# Patient Record
Sex: FEMALE | Race: WHITE | NOT HISPANIC OR LATINO | Employment: OTHER | URBAN - METROPOLITAN AREA
[De-identification: names, ages, dates, MRNs, and addresses within clinical notes are randomized per-mention and may not be internally consistent; named-entity substitution may affect disease eponyms.]

---

## 2018-02-15 ENCOUNTER — OFFICE VISIT CONVERTED (OUTPATIENT)
Dept: ORTHOPEDIC SURGERY | Facility: CLINIC | Age: 68
End: 2018-02-15
Attending: PHYSICIAN ASSISTANT

## 2018-04-05 ENCOUNTER — OFFICE VISIT CONVERTED (OUTPATIENT)
Dept: ORTHOPEDIC SURGERY | Facility: CLINIC | Age: 68
End: 2018-04-05
Attending: PHYSICIAN ASSISTANT

## 2018-05-01 ENCOUNTER — OFFICE VISIT CONVERTED (OUTPATIENT)
Dept: ORTHOPEDIC SURGERY | Facility: CLINIC | Age: 68
End: 2018-05-01
Attending: PHYSICIAN ASSISTANT

## 2018-06-15 ENCOUNTER — OFFICE VISIT CONVERTED (OUTPATIENT)
Dept: ORTHOPEDIC SURGERY | Facility: CLINIC | Age: 68
End: 2018-06-15
Attending: PHYSICIAN ASSISTANT

## 2018-06-25 ENCOUNTER — OFFICE VISIT CONVERTED (OUTPATIENT)
Dept: GASTROENTEROLOGY | Facility: CLINIC | Age: 68
End: 2018-06-25
Attending: INTERNAL MEDICINE

## 2018-08-07 ENCOUNTER — OFFICE VISIT CONVERTED (OUTPATIENT)
Dept: ORTHOPEDIC SURGERY | Facility: CLINIC | Age: 68
End: 2018-08-07
Attending: PHYSICIAN ASSISTANT

## 2018-09-04 ENCOUNTER — OFFICE VISIT CONVERTED (OUTPATIENT)
Dept: ORTHOPEDIC SURGERY | Facility: CLINIC | Age: 68
End: 2018-09-04
Attending: PHYSICIAN ASSISTANT

## 2018-10-04 ENCOUNTER — OFFICE VISIT CONVERTED (OUTPATIENT)
Dept: ORTHOPEDIC SURGERY | Facility: CLINIC | Age: 68
End: 2018-10-04
Attending: PHYSICIAN ASSISTANT

## 2019-04-25 ENCOUNTER — HOSPITAL ENCOUNTER (OUTPATIENT)
Dept: GENERAL RADIOLOGY | Facility: HOSPITAL | Age: 69
Discharge: HOME | End: 2019-04-25
Attending: FAMILY MEDICINE

## 2019-06-28 ENCOUNTER — HOSPITAL ENCOUNTER (OUTPATIENT)
Dept: LAB | Facility: HOSPITAL | Age: 69
Discharge: HOME | End: 2019-06-28
Attending: INTERNAL MEDICINE

## 2019-06-28 LAB
ALBUMIN SERPL-MCNC: 3.7 G/DL (ref 3.5–5)
ALBUMIN/GLOB SERPL: 1.3 {RATIO} (ref 1.4–2.6)
ALP SERPL-CCNC: 84 U/L (ref 43–160)
ALT SERPL-CCNC: 17 U/L (ref 10–40)
ANION GAP SERPL CALC-SCNC: 17 MMOL/L (ref 8–19)
AST SERPL-CCNC: 15 U/L (ref 15–50)
BILIRUB SERPL-MCNC: 0.26 MG/DL (ref 0.2–1.3)
BNP SERPL-MCNC: 480 PG/ML (ref 0–900)
BUN SERPL-MCNC: 37 MG/DL (ref 5–25)
BUN/CREAT SERPL: 28 {RATIO} (ref 6–20)
CALCIUM SERPL-MCNC: 9.4 MG/DL (ref 8.7–10.4)
CHLORIDE SERPL-SCNC: 106 MMOL/L (ref 99–111)
CHOLEST SERPL-MCNC: 163 MG/DL (ref 107–200)
CHOLEST/HDLC SERPL: 4.9 {RATIO} (ref 3–6)
CONV CO2: 25 MMOL/L (ref 22–32)
CONV TOTAL PROTEIN: 6.5 G/DL (ref 6.3–8.2)
CREAT UR-MCNC: 1.3 MG/DL (ref 0.5–0.9)
GFR SERPLBLD BASED ON 1.73 SQ M-ARVRAT: 41 ML/MIN/{1.73_M2}
GLOBULIN UR ELPH-MCNC: 2.8 G/DL (ref 2–3.5)
GLUCOSE SERPL-MCNC: 112 MG/DL (ref 65–99)
HDLC SERPL-MCNC: 33 MG/DL (ref 40–60)
LDLC SERPL CALC-MCNC: 102 MG/DL (ref 70–100)
OSMOLALITY SERPL CALC.SUM OF ELEC: 307 MOSM/KG (ref 273–304)
POTASSIUM SERPL-SCNC: 4.3 MMOL/L (ref 3.5–5.3)
SODIUM SERPL-SCNC: 144 MMOL/L (ref 135–147)
T4 FREE SERPL-MCNC: 1.4 NG/DL (ref 0.9–1.8)
TRIGL SERPL-MCNC: 138 MG/DL (ref 40–150)
TSH SERPL-ACNC: 1.9 M[IU]/L (ref 0.27–4.2)
VLDLC SERPL-MCNC: 28 MG/DL (ref 5–37)

## 2019-08-13 ENCOUNTER — HOSPITAL ENCOUNTER (OUTPATIENT)
Dept: CARDIOLOGY | Facility: HOSPITAL | Age: 69
Discharge: HOME | End: 2019-08-13
Attending: INTERNAL MEDICINE

## 2019-08-13 LAB
ANION GAP SERPL CALC-SCNC: 19 MMOL/L (ref 8–19)
BASOPHILS # BLD AUTO: 0.13 10*3/UL (ref 0–0.2)
BASOPHILS NFR BLD AUTO: 1 % (ref 0–3)
BNP SERPL-MCNC: 454 PG/ML (ref 0–900)
BUN SERPL-MCNC: 51 MG/DL (ref 5–25)
BUN/CREAT SERPL: 28 {RATIO} (ref 6–20)
CALCIUM SERPL-MCNC: 9.2 MG/DL (ref 8.7–10.4)
CHLORIDE SERPL-SCNC: 104 MMOL/L (ref 99–111)
CONV ABS IMM GRAN: 0.04 10*3/UL (ref 0–0.2)
CONV CO2: 27 MMOL/L (ref 22–32)
CONV IMMATURE GRAN: 0.3 % (ref 0–1.8)
CREAT UR-MCNC: 1.79 MG/DL (ref 0.5–0.9)
DEPRECATED RDW RBC AUTO: 47.4 FL (ref 36.4–46.3)
EOSINOPHIL # BLD AUTO: 0.82 10*3/UL (ref 0–0.7)
EOSINOPHIL # BLD AUTO: 6.6 % (ref 0–7)
ERYTHROCYTE [DISTWIDTH] IN BLOOD BY AUTOMATED COUNT: 13.9 % (ref 11.7–14.4)
GFR SERPLBLD BASED ON 1.73 SQ M-ARVRAT: 28 ML/MIN/{1.73_M2}
GLUCOSE SERPL-MCNC: 118 MG/DL (ref 65–99)
HCT VFR BLD AUTO: 40.5 % (ref 37–47)
HGB BLD-MCNC: 12.7 G/DL (ref 12–16)
LYMPHOCYTES # BLD AUTO: 3.7 10*3/UL (ref 1–5)
LYMPHOCYTES NFR BLD AUTO: 29.9 % (ref 20–45)
MCH RBC QN AUTO: 29.1 PG (ref 27–31)
MCHC RBC AUTO-ENTMCNC: 31.4 G/DL (ref 33–37)
MCV RBC AUTO: 92.7 FL (ref 81–99)
MONOCYTES # BLD AUTO: 1.26 10*3/UL (ref 0.2–1.2)
MONOCYTES NFR BLD AUTO: 10.2 % (ref 3–10)
NEUTROPHILS # BLD AUTO: 6.44 10*3/UL (ref 2–8)
NEUTROPHILS NFR BLD AUTO: 52 % (ref 30–85)
NRBC CBCN: 0 % (ref 0–0.7)
OSMOLALITY SERPL CALC.SUM OF ELEC: 315 MOSM/KG (ref 273–304)
PLATELET # BLD AUTO: 540 10*3/UL (ref 130–400)
PMV BLD AUTO: 9.5 FL (ref 9.4–12.3)
POTASSIUM SERPL-SCNC: 4.7 MMOL/L (ref 3.5–5.3)
RBC # BLD AUTO: 4.37 10*6/UL (ref 4.2–5.4)
SODIUM SERPL-SCNC: 145 MMOL/L (ref 135–147)
WBC # BLD AUTO: 12.39 10*3/UL (ref 4.8–10.8)

## 2019-08-29 ENCOUNTER — HOSPITAL ENCOUNTER (OUTPATIENT)
Dept: GENERAL RADIOLOGY | Facility: HOSPITAL | Age: 69
Discharge: HOME | End: 2019-08-29
Attending: NURSE PRACTITIONER

## 2019-08-29 LAB — TROPONIN T SERPL-MCNC: 0.02 NG/ML (ref 0–0.1)

## 2020-02-26 ENCOUNTER — OFFICE VISIT CONVERTED (OUTPATIENT)
Dept: CARDIOLOGY | Facility: CLINIC | Age: 70
End: 2020-02-26
Attending: INTERNAL MEDICINE

## 2020-06-08 ENCOUNTER — HOSPITAL ENCOUNTER (OUTPATIENT)
Dept: GENERAL RADIOLOGY | Facility: HOSPITAL | Age: 70
Discharge: HOME | End: 2020-06-08
Attending: INTERNAL MEDICINE

## 2020-12-09 ENCOUNTER — TELEMEDICINE CONVERTED (OUTPATIENT)
Dept: CARDIOLOGY | Facility: CLINIC | Age: 70
End: 2020-12-09
Attending: INTERNAL MEDICINE

## 2020-12-31 ENCOUNTER — TELEMEDICINE CONVERTED (OUTPATIENT)
Dept: NEUROSURGERY | Facility: CLINIC | Age: 70
End: 2020-12-31
Attending: PHYSICIAN ASSISTANT

## 2021-01-06 ENCOUNTER — HOSPITAL ENCOUNTER (OUTPATIENT)
Dept: CT IMAGING | Facility: HOSPITAL | Age: 71
Discharge: HOME | End: 2021-01-06
Attending: PHYSICIAN ASSISTANT

## 2021-02-17 ENCOUNTER — TELEMEDICINE CONVERTED (OUTPATIENT)
Dept: NEUROSURGERY | Facility: CLINIC | Age: 71
End: 2021-02-17
Attending: PHYSICIAN ASSISTANT

## 2021-03-30 ENCOUNTER — HOSPITAL ENCOUNTER (OUTPATIENT)
Dept: CARDIOLOGY | Facility: HOSPITAL | Age: 71
Discharge: HOME | End: 2021-03-30
Attending: SURGERY

## 2021-04-13 ENCOUNTER — OFFICE VISIT CONVERTED (OUTPATIENT)
Dept: SURGERY | Facility: CLINIC | Age: 71
End: 2021-04-13
Attending: SURGERY

## 2021-04-15 ENCOUNTER — HOSPITAL ENCOUNTER (OUTPATIENT)
Dept: GENERAL RADIOLOGY | Facility: HOSPITAL | Age: 71
Discharge: HOME | End: 2021-04-15
Attending: NURSE PRACTITIONER

## 2021-04-15 LAB
CREAT BLD-MCNC: 1.1 MG/DL (ref 0.6–1.4)
GFR SERPLBLD BASED ON 1.73 SQ M-ARVRAT: 50 ML/MIN/{1.73_M2}

## 2021-05-10 NOTE — H&P
History and Physical      Patient Name: Deisy Lama   Patient ID: [id number]   Sex: Female   YOB: 1950    Primary Care Provider: Michael Pappas MD   Referring Provider: MELIA BOYD    Visit Date: December 31, 2020    Provider: Summer Valdes PA-C   Location: [facility]   Location Address: [address] [place] [address]  [id number]   Location Phone: [phone]          Chief Complaint  · Back pain     New patient presenting with low back pain, no imaging.       History Of Present Illness  The patient is a 70 year old /White female, who presents on referral from MELIA BOYD, for a neurosurgical evaluation of low back pain and right leg pain.   The right leg pain involves the right lower leg in a nonspecific distribution. The pain developed gradually several years ago. It is 10/10 in severity has an aching and a sharp quality and radiates into the right lower leg in a nonspecific distribution. The pain has been constant. The pain tends to be maximal at no specific time, but waxes and wanes in severity throughout the day. The patient states the pain is aggravated by prolonged sitting, prolonged standing, walking long distances, and staying in one position for extended periods. Improves slightly with lying down.   She denies bowel or bladder dysfunction. The patient has no prior history of neck or back surgery.   RECENT INTERVENTIONS:  She has been previously treated with epidural steroids, pain medication, and gabapentin. The epidural steroids were ineffective.   INFORMATION REVIEWED:  The following information was reviewed: radiology reports and images. Lumbar radiographs and from June 2020 at [facility] revealed scoliosis with severe degenerative changes.   Video Conferencing Visit  Deisy Lama is a 70 year old /White female who is presenting for evaluation via video conferencing via zoom. Verbal consent obtained before  beginning visit.   The following staff were present during this visit: Roslyn Swift MA & Summer Valdes PA-C      She has pacemaker with defibrillator and cannot have a MRI.       Past Medical History  Aftercare following right knee joint replacement surgery; Arthritis; Bladder Disorder; Blood Diseases; Congestive heart failure; Cystourethrocele; Heart Disease; Hyperlipemia; Hypertension; Kidney stone; Limb Swelling; Primary osteoarthritis of left knee; Primary osteoarthritis of right knee; Reflux; Shortness of Breath; Stomach Disorder; Thyroid disorder; Ulcer         Past Surgical History  Appendectomy; Bladder Surg.; Cardiac defibrillator; Cataract exctraction with lens implant; Cholecystectomy; Colonoscopy; Eye Implant; Gallbladder; heart surgery; Hernia; Hysterectomy; Kidney Stone Surgery, Unspecified; Pacemaker; Pacemaker.; Spleen Removal; Tonsillectomy         Medication List  atorvastatin 10 mg oral tablet; diclofenac sodium 75 mg oral tablet,delayed release (DR/EC); furosemide 40 mg/4 mL oral solution; hydrochlorothiazide 25 mg oral tablet; levothyroxine 112 mcg oral tablet; losartan 100 mg oral tablet; metoprolol tartrate 50 mg oral tablet; omeprazole 40 mg oral capsule,delayed release(DR/EC); pravastatin 20 mg oral tablet; ropinirole 2 mg oral tablet; Zoloft 100 mg oral tablet         Allergy List  aspirin; Dilaudid; lisinopril; morphine       Allergies Reconciled  Family Medical History  Stroke; Heart Disease; Cancer, Unspecified; Diabetes, unspecified type; Prostate cancer; Family history of certain chronic disabling diseases; arthritis; Osteoporosis         Social History  Alcohol (Never); Alcohol Use (Never); Claustophobic (Unknown); Disabled; ; lives with spouse; .; Recreational Drug Use (Never); Retired.; Tobacco (Never)         Review of Systems  · Musculoskeletal  o Admits  o : low back pain  · All Others Negative      Physical Examination  · Constitutional  o Appearance  o :  "well-nourished, well developed, alert, in no acute distress  · Respiratory  o Respiratory Effort  o : breathing unlabored  · Neurologic  o Mental Status Examination  o :   § Orientation  § : alert and oriented to time, person, place and events  · Psychiatric  o Mood and Affect  o : mood normal, affect appropriate          Assessment  · Degeneration of lumbar intervertebral disc     722.52/M51.36  · Facet arthropathy     721.90/M46.90  · Radiculopathy, lumbosacral     724.4/M54.16  · Scoliosis     737.30/M41.9      Plan  · Orders  o CT Lumbar Spine without IV Contrast [facility] (06833) - 722.52/M51.36, 721.90/M46.90, 724.4/M54.16, 737.30/M41.9 - 12/31/2020   [facility] in the afternoon  · Medications  o Medications have been Reconciled  o Transition of Care or Provider Policy  · Instructions  o Encouraged to follow-up with Primary Care Provider for preventative care.  o The ROS and the PFSH were reviewed at today's visit.  o Call or return to office if symptoms worsen or persist.  o She has a pacemaker/defibrillator so will order CT lumbar spine and f/u to discuss results.   · Associate Tasks  o Task ID [id number] \"''Provider to Nurse: needs CT lumbar spine and f/u to discuss results via telehealth or in person            Electronically Signed by: NATALY GodinezC -Author on December 31, 2020 10:43:54 AM  "

## 2021-05-11 NOTE — H&P
History and Physical      Patient Name: Deisy Lama   Patient ID: [id number]   Sex: Female   YOB: 1950    Primary Care Provider: Michael Pappas MD   Referring Provider: Aayush Priest MD    Visit Date: April 13, 2021    Provider: Rogerio Carney MD   Location: [facility]   Location Address: [address] [place] [address]  [id number]   Location Phone: [phone]          Chief Complaint  · Outpatient History & Physical / Surgical Orders  · Lipoma on Arm      History Of Present Illness  Deisy Lama is a 70 year old /White female who presents to the office today as a consult from Aayush Priest MD.      Patient was referred for an evaluation for a subcutaneous masses at her left forearm.  Patient says she has 2 areas that she wants me to evaluate.  One is at her left elbow and one is at her left forearm.  The mass has been present for several years and are getting larger in size.  The patient has pain when there is external pressure placed over the masses.       Past Medical History  Disease Name Date Onset Notes   Aftercare following right knee joint replacement surgery 04/05/2018 --    Arthritis --  --    Bladder disorder --  --    Blood Diseases --  --    Congestive heart failure --  --    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 --    Heart Disease --  --    Hyperlipemia --  --    Hypertension --  --    Kidney Stone --  --    Limb Swelling --  --    Primary osteoarthritis of left knee 05/02/2018 --    Primary osteoarthritis of right knee 02/15/2018 --    Reflux --  --    Shortness of Breath --  --    Stomach Disorder --  --    Thyroid disorder --  --    Ulcer --  --          Past Surgical History  Procedure Name Date Notes   Appendectomy --  --    Bladder Surg. --  --    Cardiac defibrillator --  --    Cataract exctraction with lens implant --  --    Cholecystectomy --  --    Colonoscopy --  --    Eye Implant --  yes   Gallbladder --  --    heart surgery --  --   "  Hernia --  --    Hysterectomy --  --    Kidney Stone Surgery, Unspecified --  --    Pacemaker --  --    Pacemaker. --  --    Spleen Removal --  --    Tonsillectomy --  --          Medication List  Name Date Started Instructions   aspirin 81 mg oral tablet,chewable  chew 1 tablet (81 mg) by oral route once daily   atorvastatin 10 mg oral tablet  take 1 tablet (10 mg) by oral route once daily at bedtime   brimonidine 0.2 % ophthalmic (eye) drops  instill 1 drop into affected eye(s) by ophthalmic route every 8 hours   diclofenac sodium 75 mg oral tablet,delayed release (DR/EC)  take 1 tablet (75 mg) by oral route 2 times per day   dorzolamide-timolol 22.3-6.8 mg/mL ophthalmic (eye) drops  instill 1 drop into affected eye(s) by ophthalmic route 2 times per day   furosemide 40 mg/4 mL oral solution  --    glimepiride 2 mg oral tablet  take 1 tablet (2 mg) by oral route once daily   hydrochlorothiazide 25 mg oral tablet  take 1 tablet (25 mg) by oral route once daily   latanoprost 0.005 % ophthalmic (eye) drops  instill 1 drop into affected eye(s) by ophthalmic route once daily in the evening   levothyroxine 112 mcg oral tablet  take 1 tablet (112 mcg) by oral route once daily   losartan 100 mg oral tablet  take 1 tablet (100 mg) by oral route once daily   meloxicam 15 mg oral tablet 03/02/2021 take 1 tablet by oral route once daily for arthritis   metoprolol tartrate 50 mg oral tablet  1/2 am 1/2 pm =1   omeprazole 40 mg oral capsule,delayed release(DR/EC)  take 1 capsule (40 mg) by oral route once daily before a meal   ropinirole 2 mg oral tablet  take 1 tablet (2 mg) by oral route 3 times per day   Zoloft 50 mg oral tablet  take 1 tablet (50 mg) by oral route once daily         Allergy List  Allergen Name Date Reaction Notes   aspirin --  upsets stomach --    Dilaudid --  makes her feel \"out of it\" --    lisinopril --  cough --    morphine --  upsets stomach --          Family Medical History  Disease Name " "Relative/Age Notes   Stroke Mother/   Mother   Heart Disease Father/   Father   Cancer, Unspecified Father/  Son/   Father; Son  Father; Brother; Son  Father; Son   Diabetes, unspecified type Brother/  Mother/   Mother; Brother   Prostate cancer Father/   --    Family history of certain chronic disabling diseases; arthritis Father/  Mother/   Mother; Father  Mother; Father  Father; Brother   Osteoporosis Father/  Mother/   Mother; Father  Mother; Father; Brother  Mother; Father         Social History  Finding Status Start/Stop Quantity Notes   Alcohol Never --/-- --  --    Alcohol Use Never --/-- --  does not drink   Claustophobic Unknown --/-- --  yes   Disabled --  --/-- --  --     --  --/-- --  --    lives with spouse --  --/-- --  --    . --  --/-- --  --    Recreational Drug Use Never --/-- --  no   Retired. --  --/-- --  --    Tobacco Never --/-- --  never smoker         Review of Systems  · Constitutional  o Denies  o : chills, fever  · Gastrointestinal  o Denies  o : nausea, vomiting      Vitals  Date Time BP Position Site L\R Cuff Size HR RR TEMP (F) WT  HT  BMI kg/m2 BSA m2 O2 Sat FR L/min FiO2 HC       04/13/2021 10:33 AM       14  212lbs 16oz 5'  5\" 35.44 2.1             Physical Examination  · Constitutional  o Appearance  o : here alone, alert and in no acute distress, reliable historian  · Head and Face  o Head  o :   § Inspection  § : no visable deformities or lesions  · Eyes  o Conjunctivae  o : clear  o Sclerae  o : clear  · Neck  o Inspection/Palpation  o : normal appearance, no masses, trachea midline  · Respiratory  o Respiratory Effort  o : breathing unlabored, respiratory effort appears normal  o Inspection of Chest  o : normal appearance, no retractions  · Cardiovascular  o Heart  o : regular rate and rhythm  · Gastrointestinal  o Abdominal Examination  o :   § Abdomen  § : soft, nondistended  · Skin and Subcutaneous Tissue  o General Inspection  o : at the left palmar " forearm, there is an approximately 3 cm x 3 cm somewhat circumscribed soft nontender subcutaneous mass and the skin overlying the mass is normal appearing. at the left lateral elbow, there is an approximately 7 cm x 7 cm well circumscribed soft nontender subcutaneous mass and the skin overlying the mass is normal appearing.   · Neurologic  o Cranial Nerves  o : no obvious motor deficits  o Sensation  o : no obvious sensory deficits  o Gait and Station  o :   § Gait Screening  § : normal gait, able to stand without diffculty  o Cerebellar Function  o : no obvious abnormalities  · Psychiatric  o Judgement and Insight  o : judgment and insight intact  o Mood and Affect  o : mood normal, affect appropriate          Assessment  · Pre-Surgical Orders     V72.84  · Subcutaneous mass     782.2/R22.9  locations: left forearm and left elbow  · Preop testing     V72.84/Z01.818      Plan  · Orders  o GENERAL SURGERY ([facility]) - V72.84 - 04/29/2021  o [facility] Pre-Op Covid-19 Screening (21880) - V72.84/Z01.818 - 04/23/2021  · Medications  o Medications have been Reconciled  o Transition of Care or Provider Policy  · Instructions  o PLAN: Excision of left forearm subcutaneous mass and left elbow subcutaneous mass  o PLEASE SIGN PERMIT FOR: Excision of left forearm subcutaneous mass and left elbow subcutaneous mass  o Anesthesia: MAC  o Outpatient  o O.R. PREP: Per protocol  o IV: Per Anesthesia  o SCD's preoperatively  o No antibiotic is needed.  o The indications, options, risks, benefits, and expected outcomes of the planned procedure were discussed with the patient and the patient agrees to proceed.   o Electronically Identified Patient Education Materials Provided Electronically            Electronically Signed by: Rogerio Carney MD -Author on April 13, 2021 11:38:26 AM

## 2021-05-14 VITALS — WEIGHT: 213 LBS | RESPIRATION RATE: 14 BRPM | BODY MASS INDEX: 35.49 KG/M2 | HEIGHT: 65 IN

## 2021-05-14 NOTE — PROGRESS NOTES
Progress Note      Patient Name: Deisy Lama   Patient ID: [id number]   Sex: Female   YOB: 1950    Primary Care Provider: Michael Pappas MD   Referring Provider: MELIA BOYD    Visit Date: February 17, 2021    Provider: Summer Valdes PA-C   Location: [facility]   Location Address: [address] [place] [address]  [id number]   Location Phone: [phone]          Chief Complaint     Follow up with CT Lumbar spine done at [facility].       History Of Present Illness  The patient is a 70 year old /White female who is in the office for followup appointment.      Zoom visit to discuss CT lumbar spine.  It showed multilevel ddd with osteophytes anteriorly causing some bridging of the vertebral bodies.  Central canal appears patent.  She has a 4 cm AAA that has enlarged some from prior imaging per radiology report.  I will refer her to vascular surgeon to follow this issue. Her back pain is constant and trouble with daily activities due to the pain.  Diclofenac po without much relief.       Past Medical History  Aftercare following right knee joint replacement surgery; Arthritis; Bladder Disorder; Blood Diseases; Congestive heart failure; Cystourethrocele; Heart Disease; Hyperlipemia; Hypertension; Kidney stone; Limb Swelling; Primary osteoarthritis of left knee; Primary osteoarthritis of right knee; Reflux; Shortness of Breath; Stomach Disorder; Thyroid disorder; Ulcer         Past Surgical History  Appendectomy; Bladder Surg.; Cardiac defibrillator; Cataract exctraction with lens implant; Cholecystectomy; Colonoscopy; Eye Implant; Gallbladder; heart surgery; Hernia; Hysterectomy; Kidney Stone Surgery, Unspecified; Pacemaker; Pacemaker.; Spleen Removal; Tonsillectomy         Medication List  aspirin 81 mg oral tablet,chewable; atorvastatin 10 mg oral tablet; furosemide 40 mg/4 mL oral solution; hydrochlorothiazide 25 mg oral tablet; levothyroxine 112 mcg  oral tablet; losartan 100 mg oral tablet; metoprolol tartrate 50 mg oral tablet; omeprazole 40 mg oral capsule,delayed release(DR/EC); pravastatin 20 mg oral tablet; ropinirole 2 mg oral tablet; Zoloft 100 mg oral tablet         Allergy List  aspirin; Dilaudid; lisinopril; morphine       Allergies Reconciled  Family Medical History  Stroke; Heart Disease; Cancer, Unspecified; Diabetes, unspecified type; Prostate cancer; Family history of certain chronic disabling diseases; arthritis; Osteoporosis         Social History  Alcohol (Never); Alcohol Use (Never); Claustophobic (Unknown); Disabled; ; lives with spouse; .; Recreational Drug Use (Never); Retired.; Tobacco (Never)         Review of Systems  · Constitutional  o Denies  o : chills, excessive sweating, fatigue, fever, sycope/passing out, weight gain, weight loss  · Eyes  o Denies  o : changes in vision, blurry vision, double vision  · HENT  o Denies  o : loss of hearing, ringing in the ears, ear aches, sore throat, nasal congestion, sinus pain, nose bleeds, seasonal allergies  · Cardiovascular  o Denies  o : blood clots, swollen legs, anemia, easy burising or bleeding, transfusions  · Respiratory  o Denies  o : shortness of breath, dry cough, productive cough, pneumonia, COPD  · Gastrointestinal  o Denies  o : difficulty swallowing, reflux  · Genitourinary  o Denies  o : incontinence  · Neurologic  o Denies  o : headache, seizure, stroke, tremor, loss of balance, falls, dizziness/vertigo, difficulty with sleep, numbness/tingling/paresthesia , difficulty with coordination, difficulty with dexterity, weakness  · Musculoskeletal  o Admits  o : low back pain  o Denies  o : neck stiffness/pain, swollen lymph nodes, muscle aches, joint pain, weakness, spasms, sciatica, pain radiating in arm, pain radiating in leg  · Endocrine  o Denies  o : diabetes, thyroid disorder  · Psychiatric  o Denies  o : anxiety, depression  · All Others Negative      Physical  Examination  · Constitutional  o Appearance  o : well-nourished, well developed, alert, in no acute distress  · Respiratory  o Respiratory Effort  o : breathing unlabored  · Neurologic  o Mental Status Examination  o :   § Orientation  § : grossly oriented to person, place and time  o Gait and Station  o :   § Gait Screening  § : gait within normal limits  · Psychiatric  o Mood and Affect  o : mood normal, affect appropriate          Assessment  · Degeneration of lumbar intervertebral disc     722.52/M51.36  · Facet arthropathy     721.90/M46.90  · Radiculopathy, lumbosacral     724.4/M54.16  · Scoliosis     737.30/M41.9  · Abdominal aortic aneurysm (AAA)     441.4/I71.4  4 cm  · Osteoarthritis of spine at multiple levels     721.90/M47.819      Plan  · Orders  o VASCULAR SURGERY CONSULTATION ([facility]) - 441.4/I71.4 - 02/17/2021   4 cm AAA  · Medications  o meloxicam 7.5 mg oral tablet   SIG: take 1 tablet (7.5 mg) by oral route once daily for arthritis   DISP: (30) Tablet with 2 refills  Prescribed on 02/17/2021     o Medications have been Reconciled  o Transition of Care or Provider Policy  · Instructions  o The ROS and the PFSH were reviewed at today's visit.  o Call or return to office if symptoms worsen or persist.   o I will refer her to vascular surgeon for her 4 cm AAA. She has diffuse OA of the spine and lumbar surgery not likely to reduce her pain. She has failed LESB in past. Will switch her diclofenac to meloxicam. Call in two weeks if meloxicam isn't helping much and I'll increase her to meloxicam 15 mg one po qd.             Electronically Signed by: Summer Valdes PA-C -Author on February 17, 2021 11:40:46 AM

## 2021-05-14 NOTE — PROGRESS NOTES
"   Progress Note      Patient Name: Deisy Lama   Patient ID: [id number]   Sex: Female   YOB: 1950    Primary Care Provider: Michael Pappas MD   Referring Provider: Lauri Celis MD    Visit Date: December 9, 2020    Provider: Michael Bernal MD   Location: [facility]   Location Address: [address] [place] [address], [address]   HAZEL Perry  [id number]   Location Phone: [phone]          Chief Complaint     Edema.  Fatigue.  CHF.       History Of Present Illness  Video Conferencing Visit  Deisy Lama is a 70 year old /White female with nonischemic cardiomyopathy, hypertension, and obstructive sleep apnea who has noticed since last visit increasing generalized fatigue, as well as some edema issues. She reports no angina, but occasional atypical chest pain, as well. She has not had any significant weight gain since her last visit. Evaluation via video conferencing. Verbal consent obtained before beginning visit.   The following staff were present during this visit: Provider only.      PAST MEDICAL HISTORY: Hypertension; Nonischemic cardiomyopathy; Obstructive sleep apnea, on CPAP.      CURRENT MEDICATIONS:  Zoloft 100 mg daily; omeprazole 40 mg daily; hydrochlorothiazide 25 mg daily; metoprolol 50 mg b.i.d.; losartan 100 mg daily; atorvastatin 10 mg daily; spironolactone 50 mg b.i.d.; ropinirole 2 mg q.i.d.; Lasix 40 mg b.i.d.; diclofenac 75 mg b.i.d.; levothyroxine 112 mcg daily; dorzolamide b.i.d., latanoprost daily, brimonidine b.i.d. eye drops; glimepiride 2 mg daily.      ALLERGIES:  Aspirin; Dilaudid; lisinopril; morphine; Phenergan.       Vitals     Per patient, at-home vitals:   /86.  Heart rate 68.  Weight 210.  Height 5'5\".           Assessment     ASSESSMENT & PLAN:     1.  Nonischemic cardiomyopathy with worsening fatigue and edema.  Will repeat an echocardiogram to        see if any change LV function.  It may warrant intensification of medical treatment for CHF issues. "    2.  Hypertension, controlled.             Electronically Signed by: Mercedes Ocasio-, Other -Author on December 17, 2020 09:52:10 AM  Electronically Co-signed by: Michael Bernal MD -Reviewer on December 18, 2020 03:17:31 PM

## 2021-05-15 VITALS
HEART RATE: 66 BPM | BODY MASS INDEX: 35.16 KG/M2 | SYSTOLIC BLOOD PRESSURE: 140 MMHG | HEIGHT: 65 IN | WEIGHT: 211 LBS | DIASTOLIC BLOOD PRESSURE: 64 MMHG

## 2021-05-16 VITALS
DIASTOLIC BLOOD PRESSURE: 75 MMHG | SYSTOLIC BLOOD PRESSURE: 146 MMHG | WEIGHT: 204 LBS | BODY MASS INDEX: 33.99 KG/M2 | HEIGHT: 65 IN

## 2021-05-16 VITALS — BODY MASS INDEX: 32.99 KG/M2 | OXYGEN SATURATION: 95 % | HEIGHT: 65 IN | WEIGHT: 198 LBS | HEART RATE: 67 BPM

## 2021-05-16 VITALS — OXYGEN SATURATION: 94 % | HEART RATE: 65 BPM | WEIGHT: 205.06 LBS | HEIGHT: 66 IN | BODY MASS INDEX: 32.95 KG/M2

## 2021-05-16 VITALS — WEIGHT: 197 LBS | HEIGHT: 65 IN | OXYGEN SATURATION: 94 % | BODY MASS INDEX: 32.82 KG/M2 | HEART RATE: 76 BPM

## 2021-05-16 VITALS — HEIGHT: 65 IN | WEIGHT: 204 LBS | OXYGEN SATURATION: 93 % | BODY MASS INDEX: 33.99 KG/M2 | HEART RATE: 70 BPM

## 2021-05-16 VITALS — HEIGHT: 65 IN | HEART RATE: 84 BPM | WEIGHT: 204 LBS | BODY MASS INDEX: 33.99 KG/M2 | OXYGEN SATURATION: 96 %

## 2021-05-16 VITALS — HEART RATE: 65 BPM | BODY MASS INDEX: 33.82 KG/M2 | HEIGHT: 65 IN | WEIGHT: 203 LBS | OXYGEN SATURATION: 93 %

## 2021-05-16 VITALS — HEIGHT: 65 IN | HEART RATE: 76 BPM | BODY MASS INDEX: 33.99 KG/M2 | WEIGHT: 204 LBS | OXYGEN SATURATION: 97 %

## 2021-06-11 ENCOUNTER — TRANSCRIBE ORDERS (OUTPATIENT)
Dept: ADMINISTRATIVE | Facility: HOSPITAL | Age: 71
End: 2021-06-11

## 2021-06-11 DIAGNOSIS — Z78.0 POST-MENOPAUSAL: Primary | ICD-10-CM

## 2021-06-23 ENCOUNTER — APPOINTMENT (OUTPATIENT)
Dept: BONE DENSITY | Facility: HOSPITAL | Age: 71
End: 2021-06-23

## 2021-07-16 ENCOUNTER — LAB (OUTPATIENT)
Dept: LAB | Facility: HOSPITAL | Age: 71
End: 2021-07-16

## 2021-07-16 ENCOUNTER — HOSPITAL ENCOUNTER (OUTPATIENT)
Dept: BONE DENSITY | Facility: HOSPITAL | Age: 71
Discharge: HOME | End: 2021-07-16

## 2021-07-16 ENCOUNTER — TRANSCRIBE ORDERS (OUTPATIENT)
Dept: ADMINISTRATIVE | Facility: HOSPITAL | Age: 71
End: 2021-07-16

## 2021-07-16 DIAGNOSIS — R53.83 OTHER FATIGUE: Primary | ICD-10-CM

## 2021-07-16 DIAGNOSIS — R53.83 OTHER FATIGUE: ICD-10-CM

## 2021-07-16 DIAGNOSIS — Z78.0 POST-MENOPAUSAL: ICD-10-CM

## 2021-07-16 LAB
T4 FREE SERPL-MCNC: 1.17 NG/DL (ref 0.93–1.7)
TSH SERPL DL<=0.05 MIU/L-ACNC: 3.66 UIU/ML (ref 0.27–4.2)

## 2021-07-16 PROCEDURE — 84439 ASSAY OF FREE THYROXINE: CPT

## 2021-07-16 PROCEDURE — 36415 COLL VENOUS BLD VENIPUNCTURE: CPT

## 2021-07-16 PROCEDURE — 77080 DXA BONE DENSITY AXIAL: CPT

## 2021-07-16 PROCEDURE — 84443 ASSAY THYROID STIM HORMONE: CPT

## 2021-08-23 PROBLEM — Z95.810 PRESENCE OF BIVENTRICULAR IMPLANTABLE CARDIOVERTER-DEFIBRILLATOR (ICD): Status: ACTIVE | Noted: 2021-08-23

## 2021-08-23 PROBLEM — I50.22 CHRONIC HFREF (HEART FAILURE WITH REDUCED EJECTION FRACTION): Status: ACTIVE | Noted: 2021-08-23

## 2021-08-23 PROBLEM — E78.5 HYPERLIPEMIA: Status: ACTIVE | Noted: 2021-08-23

## 2021-08-23 PROBLEM — I50.9 CONGESTIVE HEART FAILURE: Status: ACTIVE | Noted: 2021-08-23

## 2021-08-23 PROBLEM — I10 HYPERTENSION: Status: ACTIVE | Noted: 2021-08-23

## 2021-08-23 NOTE — PROGRESS NOTES
Progress Note        Chief Complaint  Follow-up (With device check/ Not feeling well/ No energy/ SOA), Hyperlipidemia, Hypertension, and Cardioverter-Defibrillator    Subjective    Patient with some symptoms of increasing dyspnea on exertion as well as lower extremity edema patient has had some weight gain as well at home per her scales.  Patient with a recent diagnosis of diabetes    Past Medical History:   Diagnosis Date   • AAA (abdominal aortic aneurysm) (CMS/[facility])     Pt reported   • Acid reflux    • Aftercare following right knee joint replacement surgery 04/25/2018   • Arthritis    • Bladder disorder    • Blood disease    • Congestive heart failure (CHF) (CMS/[facility])    • 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   • Heart disease    • Hyperlipemia    • Hypertension    • Kidney stone    • Limb swelling    • Presence of biventricular implantable cardioverter-defibrillator (ICD) 8/23/2021   • Primary osteoarthritis of left knee 05/02/2018   • Primary osteoarthritis of right knee 02/15/2018   • Shortness of breath    • Stomach disorder    • Thyroid disorder    • Ulcer (traumatic) of oral mucosa        Current Outpatient Medications:   •  aspirin (aspirin) 81 MG EC tablet, Take 81 mg by mouth Daily., Disp: , Rfl:   •  atorvastatin (LIPITOR) 10 MG tablet, Take 10 mg by mouth Daily., Disp: , Rfl:   •  BIOTIN PO, Take  by mouth., Disp: , Rfl:   •  diclofenac (VOLTAREN) 75 MG EC tablet, Take 75 mg by mouth 2 (Two) Times a Day., Disp: , Rfl:   •  hydroCHLOROthiazide (HYDRODIURIL) 25 MG tablet, Take 25 mg by mouth Daily., Disp: , Rfl:   •  levothyroxine (SYNTHROID, LEVOTHROID) 112 MCG tablet, Take 112 mcg by mouth Daily., Disp: , Rfl:   •  losartan (COZAAR) 100 MG tablet, Take 100 mg by mouth Daily., Disp: , Rfl:   •  metoprolol succinate XL (TOPROL-XL) 50 MG 24 hr tablet, Take 50 mg by mouth 2 (two) times a day., Disp: , Rfl:   •  omeprazole (priLOSEC) 40 MG capsule, omeprazole 40 mg capsule,delayed release, Disp: , Rfl:   •   "rOPINIRole (REQUIP) 2 MG tablet, Take 2 mg by mouth 4 (Four) Times a Day., Disp: , Rfl:   •  sertraline (ZOLOFT) 100 MG tablet, Take 100 mg by mouth Daily., Disp: , Rfl:   •  furosemide (LASIX) 80 MG tablet, Take 1 tablet by mouth Daily., Disp: 90 tablet, Rfl: 3  •  spironolactone (ALDACTONE) 25 MG tablet, Take 1 tablet by mouth Daily., Disp: 90 tablet, Rfl: 3    Medications Discontinued During This Encounter   Medication Reason   • furosemide (LASIX) 40 MG tablet      No Known Allergies     Social History     Tobacco Use   • Smoking status: Never Smoker   • Smokeless tobacco: Never Used   Vaping Use   • Vaping Use: Never used   Substance Use Topics   • Alcohol use: Never   • Drug use: Never       Family History   Problem Relation Age of Onset   • Stroke Mother    • Diabetes Mother    • Arthritis Mother    • Osteoporosis Mother    • Heart disease Father    • Cancer Father         PROSTATE   • Arthritis Father    • Osteoporosis Father    • Diabetes Brother    • Cancer Son         Objective     /82   Pulse 63   Ht 162.6 cm (64\")   Wt 95.7 kg (211 lb)   BMI 36.22 kg/m²       Physical Exam    General Appearance:   · no acute distress  · Alert and oriented x3  HENT:   · lips not cyanotic  · Atraumatic  Neck:  · No jvd   · supple  Respiratory:  · no respiratory distress  · normal breath sounds  · no rales  Cardiovascular:  · Rate and rhythm  · no S3, no S4   · no murmur  · no rub  Extremities  · No cyanosis  · lower extremity edema: +1 bilateral  Skin:   · warm, dry  · No rashes      Result Review :     No results found for: PROBNP       Lab Results   Component Value Date    TSH 3.660 07/16/2021      Lab Results   Component Value Date    FREET4 1.17 07/16/2021      No results found for: DDIMERQUANT  No results found for: MG   No results found for: DIGOXIN   Lab Results   Component Value Date    TROPONINT 0.02 08/29/2019             No results found for: POCTROP     Creatinine 1.1  TSH 3.669  Free T4 1.17       "   Device interrogated normal function BiV ICD pacing hyperenhancement in the right ventricle and left ventricle    Diagnoses and all orders for this visit:    1. Chronic HFrEF nonischemic (Primary)  Assessment & Plan:  Patient has had some worsening issues with shortness of breath and edema consistent with worsening CHF recommended increasing her Lasix to 80 and adding Aldactone 25.  We will repeat BMP and BNP in 1 week also will repeat echocardiogram have patient follow-up in 1 month    Orders:  -     Adult Transthoracic Echo Complete W/ Cont if Necessary Per Protocol; Future  -     Basic Metabolic Panel; Future  -     BNP; Future    2. Presence of biventricular implantable cardioverter-defibrillator (ICD)  Assessment & Plan:  Is working properly patient is paced in 100% of the ventricles thresholds were changed      3. Essential hypertension  Assessment & Plan:  Mildly elevated adding Aldactone for help improvement with control      4. Hyperlipidemia, unspecified hyperlipidemia type    Other orders  -     spironolactone (ALDACTONE) 25 MG tablet; Take 1 tablet by mouth Daily.  Dispense: 90 tablet; Refill: 3  -     furosemide (LASIX) 80 MG tablet; Take 1 tablet by mouth Daily.  Dispense: 90 tablet; Refill: 3          Follow Up     Return in about 4 weeks (around 9/23/2021).          Patient was given instructions and counseling regarding her condition or for health maintenance advice. Please see specific information pulled into the AVS if appropriate.

## 2021-08-26 ENCOUNTER — OFFICE VISIT (OUTPATIENT)
Dept: CARDIOLOGY | Facility: CLINIC | Age: 71
End: 2021-08-26

## 2021-08-26 ENCOUNTER — CLINICAL SUPPORT NO REQUIREMENTS (OUTPATIENT)
Dept: CARDIOLOGY | Facility: CLINIC | Age: 71
End: 2021-08-26

## 2021-08-26 VITALS
SYSTOLIC BLOOD PRESSURE: 140 MMHG | WEIGHT: 211 LBS | HEIGHT: 64 IN | HEART RATE: 63 BPM | DIASTOLIC BLOOD PRESSURE: 82 MMHG | BODY MASS INDEX: 36.02 KG/M2

## 2021-08-26 DIAGNOSIS — I50.22 CHRONIC HFREF (HEART FAILURE WITH REDUCED EJECTION FRACTION) (HCC): Primary | ICD-10-CM

## 2021-08-26 DIAGNOSIS — I42.9 CARDIOMYOPATHY, UNSPECIFIED TYPE (HCC): Primary | ICD-10-CM

## 2021-08-26 DIAGNOSIS — Z95.810 PRESENCE OF BIVENTRICULAR IMPLANTABLE CARDIOVERTER-DEFIBRILLATOR (ICD): ICD-10-CM

## 2021-08-26 DIAGNOSIS — I10 ESSENTIAL HYPERTENSION: ICD-10-CM

## 2021-08-26 DIAGNOSIS — E78.5 HYPERLIPIDEMIA, UNSPECIFIED HYPERLIPIDEMIA TYPE: ICD-10-CM

## 2021-08-26 PROCEDURE — 99214 OFFICE O/P EST MOD 30 MIN: CPT | Performed by: INTERNAL MEDICINE

## 2021-08-26 PROCEDURE — 93284 PRGRMG EVAL IMPLANTABLE DFB: CPT | Performed by: INTERNAL MEDICINE

## 2021-08-26 RX ORDER — OMEPRAZOLE 40 MG/1
CAPSULE, DELAYED RELEASE ORAL
COMMUNITY
Start: 2021-05-03

## 2021-08-26 RX ORDER — ROPINIROLE 2 MG/1
2 TABLET, FILM COATED ORAL 4 TIMES DAILY
COMMUNITY
Start: 2021-06-01 | End: 2021-11-18

## 2021-08-26 RX ORDER — SERTRALINE HYDROCHLORIDE 100 MG/1
100 TABLET, FILM COATED ORAL
COMMUNITY
Start: 2021-07-06

## 2021-08-26 RX ORDER — SPIRONOLACTONE 25 MG/1
25 TABLET ORAL DAILY
Qty: 90 TABLET | Refills: 3 | Status: SHIPPED | OUTPATIENT
Start: 2021-08-26

## 2021-08-26 RX ORDER — HYDROCHLOROTHIAZIDE 25 MG/1
25 TABLET ORAL DAILY
COMMUNITY
Start: 2021-05-03

## 2021-08-26 RX ORDER — DICLOFENAC SODIUM 75 MG/1
75 TABLET, DELAYED RELEASE ORAL 2 TIMES DAILY
COMMUNITY
Start: 2021-08-06

## 2021-08-26 RX ORDER — ASPIRIN 81 MG/1
81 TABLET ORAL DAILY
COMMUNITY
End: 2022-08-24

## 2021-08-26 RX ORDER — METOPROLOL SUCCINATE 50 MG/1
50 TABLET, EXTENDED RELEASE ORAL 2 TIMES DAILY
COMMUNITY

## 2021-08-26 RX ORDER — FUROSEMIDE 80 MG
80 TABLET ORAL DAILY
Qty: 90 TABLET | Refills: 3 | Status: SHIPPED | OUTPATIENT
Start: 2021-08-26

## 2021-08-26 RX ORDER — LEVOTHYROXINE SODIUM 112 UG/1
112 TABLET ORAL DAILY
COMMUNITY
Start: 2021-05-03

## 2021-08-26 RX ORDER — LOSARTAN POTASSIUM 100 MG/1
100 TABLET ORAL DAILY
COMMUNITY
Start: 2021-07-14

## 2021-08-26 RX ORDER — FUROSEMIDE 40 MG/1
40 TABLET ORAL 2 TIMES DAILY
COMMUNITY
End: 2021-08-26

## 2021-08-26 RX ORDER — ATORVASTATIN CALCIUM 10 MG/1
10 TABLET, FILM COATED ORAL NIGHTLY
COMMUNITY
Start: 2021-07-20

## 2021-08-26 NOTE — PROGRESS NOTES
Normal BI-V ICD device interrogation.  Normal evaluation of device function and lead measurements.  No optimization was needed of parameters or maximization of device longevity.  Patient is on automated Home Remote Monitoring. She has 18 months on battery, her LV Threshold is high.

## 2021-08-26 NOTE — ASSESSMENT & PLAN NOTE
Patient has had some worsening issues with shortness of breath and edema consistent with worsening CHF recommended increasing her Lasix to 80 and adding Aldactone 25.  We will repeat BMP and BNP in 1 week also will repeat echocardiogram have patient follow-up in 1 month

## 2021-08-31 ENCOUNTER — LAB (OUTPATIENT)
Dept: LAB | Facility: HOSPITAL | Age: 71
End: 2021-08-31

## 2021-08-31 DIAGNOSIS — I50.22 CHRONIC HFREF (HEART FAILURE WITH REDUCED EJECTION FRACTION) (HCC): ICD-10-CM

## 2021-08-31 LAB
ANION GAP SERPL CALCULATED.3IONS-SCNC: 11.1 MMOL/L (ref 5–15)
BUN SERPL-MCNC: 50 MG/DL (ref 8–23)
BUN/CREAT SERPL: 30.7 (ref 7–25)
CALCIUM SPEC-SCNC: 9.3 MG/DL (ref 8.6–10.5)
CHLORIDE SERPL-SCNC: 111 MMOL/L (ref 98–107)
CO2 SERPL-SCNC: 23.9 MMOL/L (ref 22–29)
CREAT SERPL-MCNC: 1.63 MG/DL (ref 0.57–1)
GFR SERPL CREATININE-BSD FRML MDRD: 31 ML/MIN/1.73
GLUCOSE SERPL-MCNC: 97 MG/DL (ref 65–99)
POTASSIUM SERPL-SCNC: 4.8 MMOL/L (ref 3.5–5.2)
SODIUM SERPL-SCNC: 146 MMOL/L (ref 136–145)

## 2021-08-31 PROCEDURE — 36415 COLL VENOUS BLD VENIPUNCTURE: CPT

## 2021-08-31 PROCEDURE — 80048 BASIC METABOLIC PNL TOTAL CA: CPT

## 2021-08-31 PROCEDURE — 83880 ASSAY OF NATRIURETIC PEPTIDE: CPT

## 2021-09-01 LAB — NT-PROBNP SERPL-MCNC: 496.4 PG/ML (ref 0–900)

## 2021-09-07 ENCOUNTER — PATIENT MESSAGE (OUTPATIENT)
Dept: CARDIOLOGY | Facility: CLINIC | Age: 71
End: 2021-09-07

## 2021-09-07 ENCOUNTER — TELEPHONE (OUTPATIENT)
Dept: CARDIOLOGY | Facility: CLINIC | Age: 71
End: 2021-09-07

## 2021-09-07 NOTE — TELEPHONE ENCOUNTER
----- Message from OLIVER Hernandez sent at 9/3/2021 11:05 PM EDT -----  Notify pt echo has no significant change from last time, still showing some congestive heart failure. Keep 9/23 follow up

## 2021-11-18 ENCOUNTER — OFFICE VISIT (OUTPATIENT)
Dept: CARDIOLOGY | Facility: CLINIC | Age: 71
End: 2021-11-18

## 2021-11-18 VITALS
DIASTOLIC BLOOD PRESSURE: 71 MMHG | SYSTOLIC BLOOD PRESSURE: 149 MMHG | BODY MASS INDEX: 34.66 KG/M2 | HEART RATE: 62 BPM | WEIGHT: 203 LBS | HEIGHT: 64 IN

## 2021-11-18 DIAGNOSIS — I50.22 CHRONIC HFREF (HEART FAILURE WITH REDUCED EJECTION FRACTION) (HCC): Primary | ICD-10-CM

## 2021-11-18 DIAGNOSIS — I10 ESSENTIAL HYPERTENSION: ICD-10-CM

## 2021-11-18 PROCEDURE — 99214 OFFICE O/P EST MOD 30 MIN: CPT | Performed by: INTERNAL MEDICINE

## 2021-11-18 RX ORDER — BRIMONIDINE TARTRATE 2 MG/ML
1 SOLUTION/ DROPS OPHTHALMIC 2 TIMES DAILY
COMMUNITY
Start: 2021-11-01

## 2021-11-18 RX ORDER — ROPINIROLE 4 MG/1
4 TABLET, FILM COATED ORAL EVERY MORNING
COMMUNITY
Start: 2021-11-04

## 2021-11-18 RX ORDER — DORZOLAMIDE HCL 20 MG/ML
1 SOLUTION/ DROPS OPHTHALMIC 2 TIMES DAILY
COMMUNITY
Start: 2021-11-12

## 2021-11-18 RX ORDER — LATANOPROST 50 UG/ML
SOLUTION/ DROPS OPHTHALMIC
COMMUNITY
Start: 2021-11-01 | End: 2022-08-24

## 2021-11-18 RX ORDER — GLIMEPIRIDE 2 MG/1
2 TABLET ORAL
COMMUNITY
Start: 2021-10-08

## 2021-11-18 NOTE — ASSESSMENT & PLAN NOTE
Patient with stable symptoms and normalization of ejection fraction continue with dose of losartan, Toprol, and Aldactone therapy.  Encourage daily weight log may be able to titrate down on Lasix in the future

## 2021-11-18 NOTE — PROGRESS NOTES
Chief Complaint  Congestive Heart Failure    Subjective    Patient has been doing well no worsening shortness of breath issues or edema    Past Medical History:   Diagnosis Date   • AAA (abdominal aortic aneurysm) (HCC)     Pt reported   • Acid reflux    • Aftercare following right knee joint replacement surgery 04/25/2018   • Arthritis    • Bladder disorder    • Blood disease    • Congestive heart failure (CHF) (HCC)    • 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   • Heart disease    • Hyperlipemia    • Hypertension    • Kidney stone    • Limb swelling    • Presence of biventricular implantable cardioverter-defibrillator (ICD) 8/23/2021   • Primary osteoarthritis of left knee 05/02/2018   • Primary osteoarthritis of right knee 02/15/2018   • Shortness of breath    • Stomach disorder    • Thyroid disorder    • Ulcer (traumatic) of oral mucosa          Current Outpatient Medications:   •  aspirin (aspirin) 81 MG EC tablet, Take 81 mg by mouth Daily., Disp: , Rfl:   •  atorvastatin (LIPITOR) 10 MG tablet, Take 10 mg by mouth Daily., Disp: , Rfl:   •  BIOTIN PO, Take  by mouth., Disp: , Rfl:   •  brimonidine (ALPHAGAN) 0.2 % ophthalmic solution, , Disp: , Rfl:   •  diclofenac (VOLTAREN) 75 MG EC tablet, Take 75 mg by mouth 2 (Two) Times a Day., Disp: , Rfl:   •  dorzolamide (TRUSOPT) 2 % ophthalmic solution, , Disp: , Rfl:   •  furosemide (LASIX) 80 MG tablet, Take 1 tablet by mouth Daily., Disp: 90 tablet, Rfl: 3  •  glimepiride (AMARYL) 2 MG tablet, , Disp: , Rfl:   •  hydroCHLOROthiazide (HYDRODIURIL) 25 MG tablet, Take 25 mg by mouth Daily., Disp: , Rfl:   •  latanoprost (XALATAN) 0.005 % ophthalmic solution, , Disp: , Rfl:   •  levothyroxine (SYNTHROID, LEVOTHROID) 112 MCG tablet, Take 112 mcg by mouth Daily., Disp: , Rfl:   •  losartan (COZAAR) 100 MG tablet, Take 100 mg by mouth Daily., Disp: , Rfl:   •  metoprolol succinate XL (TOPROL-XL) 50 MG 24 hr tablet, Take 50 mg by mouth 2 (two) times a day., Disp: , Rfl:   •   "omeprazole (priLOSEC) 40 MG capsule, omeprazole 40 mg capsule,delayed release, Disp: , Rfl:   •  rOPINIRole (REQUIP) 4 MG tablet, , Disp: , Rfl:   •  sertraline (ZOLOFT) 100 MG tablet, Take 100 mg by mouth Daily., Disp: , Rfl:   •  spironolactone (ALDACTONE) 25 MG tablet, Take 1 tablet by mouth Daily., Disp: 90 tablet, Rfl: 3    Medications Discontinued During This Encounter   Medication Reason   • rOPINIRole (REQUIP) 2 MG tablet *Therapy completed     Allergies   Allergen Reactions   • Dilaudid [Hydromorphone] Confusion   • Lisinopril Cough        Social History     Tobacco Use   • Smoking status: Never Smoker   • Smokeless tobacco: Never Used   Vaping Use   • Vaping Use: Never used   Substance Use Topics   • Alcohol use: Never   • Drug use: Never       Family History   Problem Relation Age of Onset   • Stroke Mother    • Diabetes Mother    • Arthritis Mother    • Osteoporosis Mother    • Heart disease Father    • Cancer Father         PROSTATE   • Arthritis Father    • Osteoporosis Father    • Diabetes Brother    • Cancer Son         Objective     /71   Pulse 62   Ht 162.6 cm (64\")   Wt 92.1 kg (203 lb)   BMI 34.84 kg/m²       Physical Exam    General Appearance:   · no acute distress  · Alert and oriented x3  HENT:   · lips not cyanotic  · Atraumatic  Neck:  · No jvd   · supple  Respiratory:  · no respiratory distress  · normal breath sounds  · no rales  Cardiovascular:  · Regular rate and rhythm  · no S3, no S4   · no murmur  · no rub  Extremities  · No cyanosis  · lower extremity edema: none    Skin:   · warm, dry  · No rashes      Result Review :     proBNP   Date Value Ref Range Status   08/31/2021 496.4 0.0 - 900.0 pg/mL Final     CMP    CMP 8/31/21   Glucose 97   BUN 50 (A)   Creatinine 1.63 (A)   eGFR Non African Am 31 (A)   Sodium 146 (A)   Potassium 4.8   Chloride 111 (A)   Calcium 9.3   (A) Abnormal value               Lab Results   Component Value Date    TSH 3.660 07/16/2021      Lab Results "   Component Value Date    FREET4 1.17 07/16/2021      No results found for: DDIMERQUANT  No results found for: MG   No results found for: DIGOXIN   Lab Results   Component Value Date    TROPONINT 0.02 08/29/2019             No results found for: POCTROP    Results for orders placed in visit on 08/31/21    Adult Transthoracic Echo Complete W/ Cont if Necessary Per Protocol    Interpretation Summary  · Calculated left ventricular EF = 64% Estimated left ventricular EF was in agreement with the calculated left ventricular EF.  · Left ventricular diastolic function is consistent with (grade Ia w/high LAP) impaired relaxation.            BiV pacemaker is working properly paced 99% in the RV and LV battery life still estimated at 1.5 years    Diagnoses and all orders for this visit:    1. Chronic HFrEF nonischemic (Primary)  Assessment & Plan:  Patient with stable symptoms and normalization of ejection fraction continue with dose of losartan, Toprol, and Aldactone therapy.  Encourage daily weight log may be able to titrate down on Lasix in the future      2. Essential hypertension  Assessment & Plan:  Blood pressure is mildly elevated in office suspect whitecoat related continue with HCTZ 25 mg, losartan milligrams, Toprol 50 mg, and spironolactone 25 mg once a day.  Encourage patient to keep a home blood pressure log and also counseled on low-sodium less than 2 g/day diet            Follow Up     Return in about 6 months (around 5/18/2022).          Patient was given instructions and counseling regarding her condition or for health maintenance advice. Please see specific information pulled into the AVS if appropriate.

## 2021-11-18 NOTE — ASSESSMENT & PLAN NOTE
Blood pressure is mildly elevated in office suspect whitecoat related continue with HCTZ 25 mg, losartan milligrams, Toprol 50 mg, and spironolactone 25 mg once a day.  Encourage patient to keep a home blood pressure log and also counseled on low-sodium less than 2 g/day diet

## 2021-11-30 ENCOUNTER — TELEPHONE (OUTPATIENT)
Dept: CARDIOLOGY | Facility: CLINIC | Age: 71
End: 2021-11-30

## 2021-11-30 NOTE — TELEPHONE ENCOUNTER
Procedure: Intrathecal Pain Pump    Med Directive: Aspirin    PMH: CHF, HTN, hyperlipidemia    Last Seen: 11/18/2021

## 2022-01-27 ENCOUNTER — TRANSCRIBE ORDERS (OUTPATIENT)
Dept: VASCULAR SURGERY | Facility: HOSPITAL | Age: 72
End: 2022-01-27

## 2022-01-27 DIAGNOSIS — I71.40 ABDOMINAL AORTIC ANEURYSM (AAA) WITHOUT RUPTURE: Primary | ICD-10-CM

## 2022-02-23 ENCOUNTER — OFFICE VISIT (OUTPATIENT)
Dept: PODIATRY | Facility: CLINIC | Age: 72
End: 2022-02-23

## 2022-02-23 VITALS
HEART RATE: 64 BPM | WEIGHT: 201 LBS | SYSTOLIC BLOOD PRESSURE: 144 MMHG | DIASTOLIC BLOOD PRESSURE: 73 MMHG | HEIGHT: 64 IN | TEMPERATURE: 97.3 F | OXYGEN SATURATION: 98 % | BODY MASS INDEX: 34.31 KG/M2

## 2022-02-23 DIAGNOSIS — M79.671 FOOT PAIN, RIGHT: Primary | ICD-10-CM

## 2022-02-23 DIAGNOSIS — L84 CALLUS OF FOOT: ICD-10-CM

## 2022-02-23 PROCEDURE — 99203 OFFICE O/P NEW LOW 30 MIN: CPT | Performed by: PODIATRIST

## 2022-02-23 RX ORDER — BLOOD SUGAR DIAGNOSTIC
1 STRIP MISCELLANEOUS DAILY
COMMUNITY
Start: 2022-01-28

## 2022-02-23 RX ORDER — METHYLPREDNISOLONE 4 MG/1
TABLET ORAL
COMMUNITY
Start: 2022-02-15 | End: 2022-02-23

## 2022-02-23 NOTE — PROGRESS NOTES
[facility] - P[facility]ATRY    Today's Date: 02/23/22    Patient Name: Deisy Lama  MRN: [id number]  CSN: [id number]  PCP: Aayush Priest MD, Last PCP Visit: 1 February 2021  Referring Provider: No ref. provider found    SUBJECTIVE     Chief Complaint   Patient presents with   • Right Foot - Pain     Possible foreign body. Pain x 1 month     HPI: Deisy Lama, a 71 y.o.female, comes presents to clinic with chief complaint of:    New, Established, New Problem: New    Location:  hyperkeratotic lesion(s) on plantar right first MPJ which she was concerned was a foreign body    Duration: Early January 2022    Onset:  gradual    Nature:  sore    Stable, worsening, improving: Worsening    Aggravating factors:  Patient reports lesion(s) is painful with shoegear and ambulation.      Previous Treatment:   Applied OTC antibiotic cream    Past Medical History:   Diagnosis Date   • AAA (abdominal aortic aneurysm) ([facility])     Pt reported   • Acid reflux    • Aftercare following right knee joint replacement surgery 04/25/2018   • Arthritis    • Bladder disorder    • Blood disease    • Congestive heart failure (CHF) ([facility])    • 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   • Heart disease    • Hyperlipemia    • Hypertension    • Kidney stone    • Limb swelling    • Presence of biventricular implantable cardioverter-defibrillator (ICD) 8/23/2021   • Primary osteoarthritis of left knee 05/02/2018   • Primary osteoarthritis of right knee 02/15/2018   • Shortness of breath    • Stomach disorder    • Thyroid disorder    • Ulcer (traumatic) of oral mucosa      Past Surgical History:   Procedure Laterality Date   • APPENDECTOMY     • BLADDER SURGERY     • CARDIAC DEFIBRILLATOR PLACEMENT     • CARDIAC SURGERY     • CATARACT EXTRACTION      WITH LENS IMPLANT   • CHOLECYSTECTOMY     • COLONOSCOPY     • EYE LENS IMPLANT SECONDARY     • GALLBLADDER SURGERY     • HEEL SPUR SURGERY     • HERNIA REPAIR     • HYSTERECTOMY     • KIDNEY STONE  SURGERY     • OTHER SURGICAL HISTORY      SPLEEN REMOVAL   • PACEMAKER IMPLANTATION     • TONSILLECTOMY       Family History   Problem Relation Age of Onset   • Stroke Mother    • Diabetes Mother    • Arthritis Mother    • Osteoporosis Mother    • Heart disease Father    • Cancer Father         PROSTATE   • Arthritis Father    • Osteoporosis Father    • Diabetes Brother    • Cancer Son      Social History     Socioeconomic History   • Marital status:    Tobacco Use   • Smoking status: Never Smoker   • Smokeless tobacco: Never Used   Vaping Use   • Vaping Use: Never used   Substance and Sexual Activity   • Alcohol use: Never   • Drug use: Never   • Sexual activity: Defer     Allergies   Allergen Reactions   • Dilaudid [Hydromorphone] Confusion   • Lisinopril Cough     Current Outpatient Medications   Medication Sig Dispense Refill   • Accu-Chek Evelina Plus test strip 1 each by Other route Daily.     • aspirin (aspirin) 81 MG EC tablet Take 81 mg by mouth Daily.     • atorvastatin (LIPITOR) 10 MG tablet Take 10 mg by mouth Daily.     • BIOTIN PO Take  by mouth.     • brimonidine (ALPHAGAN) 0.2 % ophthalmic solution      • diclofenac (VOLTAREN) 75 MG EC tablet Take 75 mg by mouth 2 (Two) Times a Day.     • dorzolamide (TRUSOPT) 2 % ophthalmic solution      • furosemide (LASIX) 80 MG tablet Take 1 tablet by mouth Daily. 90 tablet 3   • glimepiride (AMARYL) 2 MG tablet      • hydroCHLOROthiazide (HYDRODIURIL) 25 MG tablet Take 25 mg by mouth Daily.     • latanoprost (XALATAN) 0.005 % ophthalmic solution      • levothyroxine (SYNTHROID, LEVOTHROID) 112 MCG tablet Take 112 mcg by mouth Daily.     • losartan (COZAAR) 100 MG tablet Take 100 mg by mouth Daily.     • metoprolol succinate XL (TOPROL-XL) 50 MG 24 hr tablet Take 50 mg by mouth 2 (two) times a day.     • omeprazole (priLOSEC) 40 MG capsule omeprazole 40 mg capsule,delayed release     • rOPINIRole (REQUIP) 4 MG tablet      • sertraline (ZOLOFT) 100 MG tablet  Take 100 mg by mouth Daily.     • spironolactone (ALDACTONE) 25 MG tablet Take 1 tablet by mouth Daily. 90 tablet 3   • mupirocin (BACTROBAN) 2 % ointment APPLY TOPICALLY TO THE AFFECTED AREA TWICE A DAY FOR 5 DAYS       No current facility-administered medications for this visit.     Review of Systems   Constitutional: Negative.    Skin:        Painful callus on plantar right first MPJ   All other systems reviewed and are negative.      OBJECTIVE     Vitals:    02/23/22 1523   BP: 144/73   Pulse: 64   Temp: 97.3 °F (36.3 °C)   SpO2: 98%       Body mass index is 34.5 kg/m².    Lab Results   Component Value Date    GLUCOSE 97 08/31/2021    CALCIUM 9.3 08/31/2021     (H) 08/31/2021    K 4.8 08/31/2021    CO2 23.9 08/31/2021     (H) 08/31/2021    BUN 50 (H) 08/31/2021    CREATININE 1.63 (H) 08/31/2021    EGFRIFNONA 31 (L) 08/31/2021    BCR 30.7 (H) 08/31/2021    ANIONGAP 11.1 08/31/2021       Patient seen in no apparent distress.      PHYSICAL EXAM:     Foot/Ankle Exam:       General:   Appearance: obesity and elderly    Orientation: AAOx3    Affect: appropriate    Gait: unimpaired    Shoe Gear:  Casual shoes    VASCULAR      Right Foot Vascularity   Normal vascular exam    Dorsalis pedis:  2+  Posterior tibial:  2+  Skin Temperature: warm    Edema Grading:  None  CFT:  < 3 seconds  Pedal Hair Growth:  Present  Varicosities: moderate varicosities       Left Foot Vascularity   Normal vascular exam    Dorsalis pedis:  2+  Posterior tibial:  2+  Skin Temperature: warm    Edema Grading:  None  CFT:  < 3 seconds  Pedal Hair Growth:  Present  Varicosities: moderate varicosities        NEUROLOGIC     Right Foot Neurologic   Normal sensation    Light touch sensation:  Normal  Vibratory sensation:  Normal  Hot/Cold sensation: normal    Protective Sensation using [place]-Ang[place]lica Monofilament:  10     Left Foot Neurologic   Normal sensation    Light touch sensation:  Normal  Vibratory sensation:  Normal  Hot/cold  sensation: normal    Protective Sensation using [place]-Ang[place]lica Monofilament:  10     MUSCLE STRENGTH     Right Foot Muscle Strength   Foot dorsiflexion:  4  Foot plantar flexion:  4  Foot inversion:  4  Foot eversion:  4     Left Foot Muscle Strength   Foot dorsiflexion:  4  Foot plantar flexion:  4  Foot inversion:  4  Foot eversion:  4     RANGE OF MOTION      Right Foot Range of Motion   Foot and ankle ROM within normal limits       Left Foot Range of Motion   Foot and ankle ROM within normal limits       DERMATOLOGIC     Right Foot Dermatologic   Skin: corn    Nails: normal       Left Foot Dermatologic   Skin: skin intact    Nails: normal        Right Foot Additional Comments Hyperkeratotic lesion on plantar right first metatarsal head.  Partial thickness debridement with #15 scalpel blade down to health tissue.  No signs of edema, erythema, lymphangitis, nor signs of infection.          No foreign body was seen upon debridement of hyperkeratotic lesion on plantar right first metatarsal head.  ASSESSMENT/PLAN     Diagnoses and all orders for this visit:    1. Foot pain, right (Primary)    2. Callus of foot        Comprehensive lower extremity examination and evaluation was performed.    Discussed findings and treatment plan including risks, benefits, and treatment options with patient in detail. Patient agreed with treatment plan.    An After Visit Summary was printed and given to the patient at discharge, including (if requested) any available informative/educational handouts regarding diagnosis, treatment, or medications. All questions were answered to patient/family satisfaction. Should symptoms fail to improve or worsen they agree to call or return to clinic or to go to the Emergency Department. Discussed the importance of following up with any needed screening tests/labs/specialist appointments and any requested follow-up recommended by me today. Importance of maintaining follow-up discussed and patient  accepts that missed appointments can delay diagnosis and potentially lead to worsening of conditions.    Return if symptoms worsen or fail to improve., or sooner if acute issues arise.    This document has been electronically signed by Saturnino Harper DPM on February 23, 2022 16:30 EST

## 2022-02-24 ENCOUNTER — PATIENT ROUNDING (BHMG ONLY) (OUTPATIENT)
Dept: PODIATRY | Facility: CLINIC | Age: 72
End: 2022-02-24

## 2022-02-24 NOTE — PROGRESS NOTES
February 24, 2022    Hello, may I speak with Deisy Lama?    My name is Flor      I am  with [facility]  [address]  [place] [address] [phone].    Before we get started may I verify your date of birth? 1950    I am calling to officially welcome you to our practice and ask about your recent visit. Is this a good time to talk? yes    Tell me about your visit with us. What things went well?  everyone as nice as they can be, found out what was wrong with foot        We're always looking for ways to make our patients' experiences even better. Do you have recommendations on ways we may improve?  no    Overall were you satisfied with your first visit to our practice? yes       I appreciate you taking the time to speak with me today. Is there anything else I can do for you? no      Thank you, and have a great day.

## 2022-04-29 ENCOUNTER — OFFICE VISIT (OUTPATIENT)
Dept: ORTHOPEDIC SURGERY | Facility: CLINIC | Age: 72
End: 2022-04-29

## 2022-04-29 VITALS — BODY MASS INDEX: 34.15 KG/M2 | HEIGHT: 64 IN | WEIGHT: 200 LBS

## 2022-04-29 DIAGNOSIS — M25.552 LEFT HIP PAIN: Primary | ICD-10-CM

## 2022-04-29 DIAGNOSIS — M16.12 PRIMARY OSTEOARTHRITIS OF LEFT HIP: ICD-10-CM

## 2022-04-29 PROCEDURE — 96372 THER/PROPH/DIAG INJ SC/IM: CPT | Performed by: ORTHOPAEDIC SURGERY

## 2022-04-29 PROCEDURE — 99203 OFFICE O/P NEW LOW 30 MIN: CPT | Performed by: ORTHOPAEDIC SURGERY

## 2022-04-29 NOTE — PROGRESS NOTES
"Chief Complaint  Initial Evaluation of the Left Hip     Subjective      Deisy Lama presents to [facility] for an evaluation of left hip. She reports pain started in the groin. Now she has shooting pains down into the thigh and into the knee. She denies any injury or trauma of the left hip. She reports pain began November/December of 2021. Pain progressively worsened. She states going to pain management, they suggested an epidural injection. She had one but it gave her no relief.     Allergies   Allergen Reactions   • Dilaudid [Hydromorphone] Confusion   • Lisinopril Cough        Social History     Socioeconomic History   • Marital status:    Tobacco Use   • Smoking status: Never Smoker   • Smokeless tobacco: Never Used   Vaping Use   • Vaping Use: Never used   Substance and Sexual Activity   • Alcohol use: Never   • Drug use: Never   • Sexual activity: Defer        Review of Systems     Objective   Vital Signs:   Ht 162.6 cm (64\")   Wt 90.7 kg (200 lb)   BMI 34.33 kg/m²       Physical Exam  Constitutional:       Appearance: Normal appearance. Patient is well-developed and normal weight.   HENT:      Head: Normocephalic.      Right Ear: Hearing and external ear normal.      Left Ear: Hearing and external ear normal.      Nose: Nose normal.   Eyes:      Conjunctiva/sclera: Conjunctivae normal.   Cardiovascular:      Rate and Rhythm: Normal rate.   Pulmonary:      Effort: Pulmonary effort is normal.      Breath sounds: No wheezing or rales.   Abdominal:      Palpations: Abdomen is soft.      Tenderness: There is no abdominal tenderness.   Musculoskeletal:      Cervical back: Normal range of motion.   Skin:     Findings: No rash.   Neurological:      Mental Status: Patient is alert and oriented to person, place, and time.   Psychiatric:         Mood and Affect: Mood and affect normal.         Judgment: Judgment normal.       Ortho Exam      LEFT HIP: Limping gait. Good tone of " hip flexors, hip extensors, hip adductor, hip abductors. Pain with hip range of motion in the groin. Non-tender greater trochanter. Calf soft.       Procedures      Imaging Results (Most Recent)     Procedure Component Value Units Date/Time    XR Hip With or Without Pelvis 2 - 3 View Left [[id number]] Resulted: 04/29/22 1503     Updated: 04/29/22 1503           Result Review :     X-Ray Report:  Left hip(s) X-Ray  Indication: Evaluation of left hip pain   AP and Lateral view(s)  Findings: There are degenerative changes present of the left hip. No fractures or dislocation.   Prior studies available for comparison: no       Assessment and Plan     DX: Left hip osteoarthritis     She has osteoarthritis of the hip but it isn't advanced enough to consider a hip replacement. She hasn't tried therapy yet. She opted to try this. A prescription for PT written. Im injection administered, she tolerated this well.     Call or return if worsening symptoms.    Follow Up     4-6 weeks.       Patient was given instructions and counseling regarding her condition or for health maintenance advice. Please see specific information pulled into the AVS if appropriate.     Scribed for Ewa Rodriguez MD by Sushma De Paz.  04/29/22   15:10 EDT    I have personally performed the services described in this document as scribed by the above individual and it is both accurate and complete. Ewa Rodriguez MD 04/30/22

## 2022-05-02 ENCOUNTER — OFFICE VISIT (OUTPATIENT)
Dept: VASCULAR SURGERY | Facility: HOSPITAL | Age: 72
End: 2022-05-02

## 2022-05-02 ENCOUNTER — HOSPITAL ENCOUNTER (OUTPATIENT)
Dept: CARDIOLOGY | Facility: HOSPITAL | Age: 72
Discharge: HOME | End: 2022-05-02

## 2022-05-02 VITALS
TEMPERATURE: 99.4 F | SYSTOLIC BLOOD PRESSURE: 140 MMHG | OXYGEN SATURATION: 94 % | DIASTOLIC BLOOD PRESSURE: 90 MMHG | RESPIRATION RATE: 16 BRPM | HEART RATE: 61 BPM

## 2022-05-02 DIAGNOSIS — L97.929 ULCER OF LEFT LOWER EXTREMITY, UNSPECIFIED ULCER STAGE: ICD-10-CM

## 2022-05-02 DIAGNOSIS — I71.40 AAA (ABDOMINAL AORTIC ANEURYSM) WITHOUT RUPTURE: Primary | ICD-10-CM

## 2022-05-02 DIAGNOSIS — I71.40 ABDOMINAL AORTIC ANEURYSM (AAA) WITHOUT RUPTURE: ICD-10-CM

## 2022-05-02 LAB
ABDOMINAL DIST AORTA AP: 1.87 CM
ABDOMINAL DIST AORTA TRANS: 1.58 CM
ABDOMINAL LT COM ILIAC AP: 1.14 CM
ABDOMINAL LT COM ILIAC TRANS: 1.12 CM
ABDOMINAL MID AORTA AP: 4.52 CM
ABDOMINAL MID AORTA TRANS: 4.28 CM
ABDOMINAL PROX AORTA AP: 1.7 CM
ABDOMINAL PROX AORTA TRANS: 2.02 CM
ABDOMINAL RT COM ILIAC AP: 1.24 CM
ABDOMINAL RT COM ILIAC TRANS: 1.12 CM
BH CV VAS SMA 1ST PP TIME: 15 MIN
BH CV VAS SMA 2ND PP TIME: 30 MIN
BH CV VAS SMA 3RD PP TIME: 45 MIN
MAXIMAL PREDICTED HEART RATE: 146 BPM
STRESS TARGET HR: 124 BPM

## 2022-05-02 PROCEDURE — 93979 VASCULAR STUDY: CPT | Performed by: SURGERY

## 2022-05-02 PROCEDURE — 99213 OFFICE O/P EST LOW 20 MIN: CPT | Performed by: NURSE PRACTITIONER

## 2022-05-02 PROCEDURE — 93979 VASCULAR STUDY: CPT

## 2022-05-02 RX ORDER — AMOXICILLIN AND CLAVULANATE POTASSIUM 500; 125 MG/1; MG/1
1 TABLET, FILM COATED ORAL 2 TIMES DAILY
Qty: 20 TABLET | Refills: 0 | Status: SHIPPED | OUTPATIENT
Start: 2022-05-02 | End: 2022-05-12

## 2022-05-02 NOTE — PROGRESS NOTES
[facility] Office Follow Up Note     Date of Encounter: 05/02/2022     MRN Number: [id number]  Name: Deisy Lama  Phone Number: [phone]     Referred By: No ref. provider found  PCP: Ayaush Priest MD    Chief Complaint:    Chief Complaint   Patient presents with   • Aortic Aneurysm     ANNUAL F/U FOR AAA/ AORTA US DONE TODAY/GOING TO PAIN MANAGEMENT FOR BACK AND BILAT HIP PN       Subjective      History of Present Illness:    Deisy Lama is a 72 y.o. female presents for annual follow-up for abdominal aortic aneurysm with a ultrasound performed today.  She was previously seen by Dr. Correa after a coincidental finding of a AAA that measured approximately 4 cm on a CT of the lumbar spine however, she did have a previous study in 2018 that showed the AAA at 3.5 cm.  She is a newly diagnosed diabetic and has chronic low back pain that is being treated in [facility]. She denies any abdominal pain and is not a smoker. She takes care of her  who had a stroke several years ago.  Her left lower extremity is swollen and red, she has not been on an object while assisting her  with transfer. She denies any fever or chills.  She is a non-smoker and no known family history of AAA.    Review of Systems:  ROS  Review of Systems   Constitutional: Negative.   HENT: Negative.    Cardiovascular: Negative.    Respiratory: Negative.    Skin: Left leg swelling and redness.    Musculoskeletal: Negative.    Gastrointestinal: Negative.    Neurological: Negative.    Psychiatric/Behavioral: Negative.      I have reviewed the following portions of the patient's history: allergies, current medications, past family history, past medical history, past social history, past surgical history and problem list and confirm it's accurate.    Allergies:  Allergies   Allergen Reactions   • Dilaudid [Hydromorphone] Confusion   • Lisinopril Cough       Medications:      Current  Outpatient Medications:   •  Accu-Chek Evelina Plus test strip, 1 each by Other route Daily., Disp: , Rfl:   •  atorvastatin (LIPITOR) 10 MG tablet, Take 10 mg by mouth Daily., Disp: , Rfl:   •  BIOTIN PO, Take  by mouth., Disp: , Rfl:   •  brimonidine (ALPHAGAN) 0.2 % ophthalmic solution, , Disp: , Rfl:   •  diclofenac (VOLTAREN) 75 MG EC tablet, Take 75 mg by mouth 2 (Two) Times a Day., Disp: , Rfl:   •  dorzolamide (TRUSOPT) 2 % ophthalmic solution, , Disp: , Rfl:   •  furosemide (LASIX) 80 MG tablet, Take 1 tablet by mouth Daily., Disp: 90 tablet, Rfl: 3  •  glimepiride (AMARYL) 2 MG tablet, , Disp: , Rfl:   •  hydroCHLOROthiazide (HYDRODIURIL) 25 MG tablet, Take 25 mg by mouth Daily., Disp: , Rfl:   •  latanoprost (XALATAN) 0.005 % ophthalmic solution, , Disp: , Rfl:   •  levothyroxine (SYNTHROID, LEVOTHROID) 112 MCG tablet, Take 112 mcg by mouth Daily., Disp: , Rfl:   •  losartan (COZAAR) 100 MG tablet, Take 100 mg by mouth Daily., Disp: , Rfl:   •  metoprolol succinate XL (TOPROL-XL) 50 MG 24 hr tablet, Take 50 mg by mouth 2 (two) times a day., Disp: , Rfl:   •  mupirocin (BACTROBAN) 2 % ointment, APPLY TOPICALLY TO THE AFFECTED AREA TWICE A DAY FOR 5 DAYS, Disp: , Rfl:   •  omeprazole (priLOSEC) 40 MG capsule, omeprazole 40 mg capsule,delayed release, Disp: , Rfl:   •  rOPINIRole (REQUIP) 4 MG tablet, , Disp: , Rfl:   •  sertraline (ZOLOFT) 100 MG tablet, Take 100 mg by mouth Daily., Disp: , Rfl:   •  spironolactone (ALDACTONE) 25 MG tablet, Take 1 tablet by mouth Daily., Disp: 90 tablet, Rfl: 3  •  amoxicillin-clavulanate (Augmentin) 500-125 MG per tablet, Take 1 tablet by mouth 2 (Two) Times a Day for 10 days., Disp: 20 tablet, Rfl: 0  •  aspirin 81 MG EC tablet, Take 81 mg by mouth Daily., Disp: , Rfl:     History:   Past Medical History:   Diagnosis Date   • AAA (abdominal aortic aneurysm) (HCC)     Pt reported   • Acid reflux    • Aftercare following right knee joint replacement surgery 04/25/2018   •  Arthritis    • Bladder disorder    • Blood disease    • Congestive heart failure (CHF) (HCC)    • 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   • Heart disease    • Hyperlipemia    • Hypertension    • Kidney stone    • Limb swelling    • Presence of biventricular implantable cardioverter-defibrillator (ICD) 8/23/2021   • Primary osteoarthritis of left knee 05/02/2018   • Primary osteoarthritis of right knee 02/15/2018   • Shortness of breath    • Stomach disorder    • Thyroid disorder    • Ulcer (traumatic) of oral mucosa        Past Surgical History:   Procedure Laterality Date   • APPENDECTOMY     • BLADDER SURGERY     • CARDIAC DEFIBRILLATOR PLACEMENT     • CARDIAC SURGERY     • CATARACT EXTRACTION      WITH LENS IMPLANT   • CHOLECYSTECTOMY     • COLONOSCOPY     • EYE LENS IMPLANT SECONDARY     • GALLBLADDER SURGERY     • HEEL SPUR SURGERY     • HERNIA REPAIR     • HYSTERECTOMY     • KIDNEY STONE SURGERY     • OTHER SURGICAL HISTORY      SPLEEN REMOVAL   • PACEMAKER IMPLANTATION     • TONSILLECTOMY         Social History     Socioeconomic History   • Marital status:    Tobacco Use   • Smoking status: Never Smoker   • Smokeless tobacco: Never Used   Vaping Use   • Vaping Use: Never used   Substance and Sexual Activity   • Alcohol use: Never   • Drug use: Never   • Sexual activity: Defer        Family History   Problem Relation Age of Onset   • Stroke Mother    • Diabetes Mother    • Arthritis Mother    • Osteoporosis Mother    • Heart disease Father    • Cancer Father         PROSTATE   • Arthritis Father    • Osteoporosis Father    • Diabetes Brother    • Cancer Son        Objective     Physical Exam:  Vitals:    05/02/22 0950   BP: 140/90   BP Location: Left arm   Patient Position: Sitting   Cuff Size: Adult   Pulse: 61   Resp: 16   Temp: 99.4 °F (37.4 °C)   TempSrc: Temporal   SpO2: 94%      There is no height or weight on file to calculate BMI.    Physical Exam  Constitutional:       Appearance: Normal appearance.    HENT:      Head: Normocephalic.   Cardiovascular:      Rate and Rhythm: Normal rate.      Pulses: Normal pulses.      Comments:   Pulmonary:      Effort: Pulmonary effort is normal.   Musculoskeletal:         General: Normal range of motion.      Cervical back: Normal range of motion.   Skin:     General: Skin is warm and dry.      Capillary Refill: Capillary refill takes less than 2 seconds.      Comments: Left lower extremity injury: Erythema, edema and warmth, no drainage  Neurological:      General: No focal deficit present.      Mental Status: She is alert and oriented to person, place, and time.   Psychiatric:         Mood and Affect: Mood normal.         Behavior: Behavior normal.    Imaging/Labs:  I have reviewed the preliminary results of the ultrasound of the abdominal aortic aneurysm performed today, 5/2/2022.        Assessment / Plan      Assessment / Plan:  Diagnoses and all orders for this visit:    1. AAA (abdominal aortic aneurysm) without rupture (HCC) (Primary)  -     CT angiogram abdomen pelvis w wo contrast; Future    2. Ulcer of left lower extremity, unspecified ulcer stage (HCC)  -     amoxicillin-clavulanate (Augmentin) 500-125 MG per tablet; Take 1 tablet by mouth 2 (Two) Times a Day for 10 days.  Dispense: 20 tablet; Refill: 0      MsDustin Lama has an asymptomatic abdominal aortic aneurysm that measured approximately 4.85 maximal diameter on the ultrasound performed today.  The previous CT in March 2021 measured the AAA at 4 cm.  I recommend that we obtain a CTA  of the abdomen pelvis with and without contrast to evaluate and follow-up in the office.  I have answered all of her questions and she is in agreement with the plan at this time.    Thank you for allowing me to participate in your patient's care.          Follow Up:   No follow-ups on file.   Or sooner for any further concerns or worsening sign and symptoms. If unable to reach us in the office please dial 911 or go to the nearest  emergency department.      Juwan BOYD  [facility] Surgery

## 2022-05-10 RX ORDER — DEXAMETHASONE SODIUM PHOSPHATE 4 MG/ML
8 INJECTION, SOLUTION INTRA-ARTICULAR; INTRALESIONAL; INTRAMUSCULAR; INTRAVENOUS; SOFT TISSUE ONCE
Status: COMPLETED | OUTPATIENT
Start: 2022-05-10 | End: 2022-05-10

## 2022-05-10 RX ADMIN — DEXAMETHASONE SODIUM PHOSPHATE 8 MG: 4 INJECTION, SOLUTION INTRA-ARTICULAR; INTRALESIONAL; INTRAMUSCULAR; INTRAVENOUS; SOFT TISSUE at 09:18

## 2022-05-16 ENCOUNTER — TRANSCRIBE ORDERS (OUTPATIENT)
Dept: ADMINISTRATIVE | Facility: HOSPITAL | Age: 72
End: 2022-05-16

## 2022-05-16 DIAGNOSIS — M51.36 DDD (DEGENERATIVE DISC DISEASE), LUMBAR: Primary | ICD-10-CM

## 2022-05-18 RX ORDER — NETARSUDIL AND LATANOPROST OPHTHALMIC SOLUTION, 0.02%/0.005% .2; .05 MG/ML; MG/ML
1 SOLUTION/ DROPS OPHTHALMIC; TOPICAL NIGHTLY
Status: ON HOLD | COMMUNITY
End: 2023-01-27

## 2022-05-20 ENCOUNTER — APPOINTMENT (OUTPATIENT)
Dept: CT IMAGING | Facility: HOSPITAL | Age: 72
End: 2022-05-20

## 2022-05-23 ENCOUNTER — HOSPITAL ENCOUNTER (OUTPATIENT)
Dept: CT IMAGING | Facility: HOSPITAL | Age: 72
Discharge: HOME | End: 2022-05-23

## 2022-05-23 ENCOUNTER — HOSPITAL ENCOUNTER (OUTPATIENT)
Dept: INTERVENTIONAL RADIOLOGY/VASCULAR | Facility: HOSPITAL | Age: 72
Discharge: HOME | End: 2022-05-23

## 2022-05-23 ENCOUNTER — LAB (OUTPATIENT)
Dept: LAB | Facility: HOSPITAL | Age: 72
End: 2022-05-23

## 2022-05-23 VITALS
SYSTOLIC BLOOD PRESSURE: 122 MMHG | OXYGEN SATURATION: 97 % | HEART RATE: 58 BPM | DIASTOLIC BLOOD PRESSURE: 66 MMHG | RESPIRATION RATE: 16 BRPM

## 2022-05-23 DIAGNOSIS — M51.36 DDD (DEGENERATIVE DISC DISEASE), LUMBAR: ICD-10-CM

## 2022-05-23 LAB
APTT PPP: 28.8 SECONDS (ref 24.2–34.2)
INR PPP: 1.02 (ref 0.86–1.15)
PLATELET # BLD AUTO: 535 10*3/MM3 (ref 140–450)
PROTHROMBIN TIME: 13.5 SECONDS (ref 11.8–14.9)

## 2022-05-23 PROCEDURE — 85610 PROTHROMBIN TIME: CPT | Performed by: ANESTHESIOLOGY

## 2022-05-23 PROCEDURE — 72132 CT LUMBAR SPINE W/DYE: CPT

## 2022-05-23 PROCEDURE — 85049 AUTOMATED PLATELET COUNT: CPT | Performed by: ANESTHESIOLOGY

## 2022-05-23 PROCEDURE — 85730 THROMBOPLASTIN TIME PARTIAL: CPT | Performed by: ANESTHESIOLOGY

## 2022-05-23 PROCEDURE — 62284 INJECTION FOR MYELOGRAM: CPT

## 2022-05-23 PROCEDURE — 62304 MYELOGRAPHY LUMBAR INJECTION: CPT

## 2022-05-23 PROCEDURE — 0 IOPAMIDOL 41 % SOLUTION: Performed by: ANESTHESIOLOGY

## 2022-05-23 PROCEDURE — 72240 MYELOGRAPHY NECK SPINE: CPT

## 2022-05-23 RX ORDER — LIDOCAINE HYDROCHLORIDE 20 MG/ML
INJECTION, SOLUTION INFILTRATION; PERINEURAL
Status: COMPLETED
Start: 2022-05-23 | End: 2022-05-23

## 2022-05-23 RX ADMIN — LIDOCAINE HYDROCHLORIDE 10 ML: 20 INJECTION, SOLUTION INFILTRATION; PERINEURAL at 10:00

## 2022-05-23 RX ADMIN — IOPAMIDOL 11 ML: 408 INJECTION, SOLUTION INTRATHECAL at 10:05

## 2022-05-23 NOTE — NURSING NOTE
Received to xray holding s\p myelogram. bandaid dry and intact to lower back. Denies symptoms at present. Instructed on no bedrest for now with no lying flat or bending over for 24 hours. Pt informed tech she didn't know she need . Tech informed nursing staff that pt would call one of two persons with number provided.

## 2022-05-31 ENCOUNTER — HOSPITAL ENCOUNTER (OUTPATIENT)
Dept: CT IMAGING | Facility: HOSPITAL | Age: 72
Discharge: HOME | End: 2022-05-31
Admitting: NURSE PRACTITIONER

## 2022-05-31 DIAGNOSIS — I71.40 AAA (ABDOMINAL AORTIC ANEURYSM) WITHOUT RUPTURE: ICD-10-CM

## 2022-05-31 LAB
CREAT BLDA-MCNC: 1.9 MG/DL
EGFRCR SERPLBLD CKD-EPI 2021: 27.4 ML/MIN/1.73

## 2022-05-31 PROCEDURE — 82565 ASSAY OF CREATININE: CPT

## 2022-05-31 PROCEDURE — 74176 CT ABD & PELVIS W/O CONTRAST: CPT

## 2022-06-02 ENCOUNTER — OFFICE VISIT (OUTPATIENT)
Dept: CARDIOLOGY | Facility: CLINIC | Age: 72
End: 2022-06-02

## 2022-06-02 VITALS
WEIGHT: 210 LBS | HEIGHT: 65 IN | RESPIRATION RATE: 16 BRPM | HEART RATE: 77 BPM | BODY MASS INDEX: 34.99 KG/M2 | DIASTOLIC BLOOD PRESSURE: 59 MMHG | SYSTOLIC BLOOD PRESSURE: 135 MMHG

## 2022-06-02 DIAGNOSIS — Z95.810 PRESENCE OF BIVENTRICULAR IMPLANTABLE CARDIOVERTER-DEFIBRILLATOR (ICD): ICD-10-CM

## 2022-06-02 DIAGNOSIS — I50.22 CHRONIC HFREF (HEART FAILURE WITH REDUCED EJECTION FRACTION): Primary | ICD-10-CM

## 2022-06-02 DIAGNOSIS — E78.5 HYPERLIPIDEMIA LDL GOAL <100: ICD-10-CM

## 2022-06-02 DIAGNOSIS — I10 ESSENTIAL HYPERTENSION: ICD-10-CM

## 2022-06-02 PROBLEM — M17.9 OSTEOARTHRITIS OF KNEE: Status: ACTIVE | Noted: 2018-05-02

## 2022-06-02 PROBLEM — M51.36 DEGENERATION OF LUMBAR INTERVERTEBRAL DISC: Status: ACTIVE | Noted: 2022-06-02

## 2022-06-02 PROBLEM — M47.817 LUMBOSACRAL SPONDYLOSIS WITHOUT MYELOPATHY: Status: ACTIVE | Noted: 2021-06-11

## 2022-06-02 PROBLEM — M51.369 DEGENERATION OF LUMBAR INTERVERTEBRAL DISC: Status: ACTIVE | Noted: 2022-06-02

## 2022-06-02 PROCEDURE — 99214 OFFICE O/P EST MOD 30 MIN: CPT | Performed by: NURSE PRACTITIONER

## 2022-06-02 NOTE — PROGRESS NOTES
Chief Complaint  Follow-up    Subjective            History of Present Illness  Deisy Lama is a 72-year-old white/ female patient who presents to the office today for follow up. She has chronic HFrEF with normalization of ejection fraction, hypertension, hyperlipidemia, and presence of ICD. She reports compliance with all medications except she takes her lasix every other day. She reports some bilateral lower extremity edema and shortness of breath with exertion that has been progressively worsening over the past three months. She denies chest pain, lightheadedness, dizziness, or palpitations.     PMH  Past Medical History:   Diagnosis Date   • AAA (abdominal aortic aneurysm) ([facility])     Pt reported   • Acid reflux    • Aftercare following right knee joint replacement surgery 04/25/2018   • Arthritis    • Bladder disorder    • Blood disease    • Congestive heart failure (CHF) ([facility])    • 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   • Heart disease    • Hyperlipemia    • Hyperlipidemia LDL goal <100 8/23/2021   • Hypertension    • Kidney stone    • Limb swelling    • Presence of biventricular implantable cardioverter-defibrillator (ICD) 8/23/2021   • Primary osteoarthritis of left knee 05/02/2018   • Primary osteoarthritis of right knee 02/15/2018   • Shortness of breath    • Stomach disorder    • Thyroid disorder    • Ulcer (traumatic) of oral mucosa          ALLERGY  Allergies   Allergen Reactions   • Dilaudid [Hydromorphone] Confusion   • Lisinopril Cough          SURGICALHX  Past Surgical History:   Procedure Laterality Date   • APPENDECTOMY     • BLADDER SURGERY     • CARDIAC DEFIBRILLATOR PLACEMENT     • CARDIAC SURGERY     • CATARACT EXTRACTION      WITH LENS IMPLANT   • CHOLECYSTECTOMY     • COLONOSCOPY     • EYE LENS IMPLANT SECONDARY     • GALLBLADDER SURGERY     • HEEL SPUR SURGERY     • HERNIA REPAIR     • HYSTERECTOMY     • KIDNEY STONE SURGERY     • OTHER SURGICAL HISTORY      SPLEEN REMOVAL   • PACEMAKER  IMPLANTATION     • TONSILLECTOMY            SOC  Social History     Socioeconomic History   • Marital status:    Tobacco Use   • Smoking status: Never Smoker   • Smokeless tobacco: Never Used   Vaping Use   • Vaping Use: Never used   Substance and Sexual Activity   • Alcohol use: Never   • Drug use: Never   • Sexual activity: Defer         FAMHX  Family History   Problem Relation Age of Onset   • Stroke Mother    • Diabetes Mother    • Arthritis Mother    • Osteoporosis Mother    • Heart disease Father    • Cancer Father         PROSTATE   • Arthritis Father    • Osteoporosis Father    • Diabetes Brother    • Cancer Son           MEDSIGONLY  Current Outpatient Medications on File Prior to Visit   Medication Sig   • Accu-Chek Evelina Plus test strip 1 each by Other route Daily.   • aspirin 81 MG EC tablet Take 81 mg by mouth Daily.   • atorvastatin (LIPITOR) 10 MG tablet Take 10 mg by mouth Daily.   • BIOTIN PO Take  by mouth.   • brimonidine (ALPHAGAN) 0.2 % ophthalmic solution    • diclofenac (VOLTAREN) 75 MG EC tablet Take 75 mg by mouth 2 (Two) Times a Day.   • dorzolamide (TRUSOPT) 2 % ophthalmic solution    • furosemide (LASIX) 80 MG tablet Take 1 tablet by mouth Daily. (Patient taking differently: Take 80 mg by mouth Every Other Day.)   • glimepiride (AMARYL) 2 MG tablet Take 2 mg by mouth Every Morning Before Breakfast.   • hydroCHLOROthiazide (HYDRODIURIL) 25 MG tablet Take 25 mg by mouth Daily.   • latanoprost (XALATAN) 0.005 % ophthalmic solution    • levothyroxine (SYNTHROID, LEVOTHROID) 112 MCG tablet Take 112 mcg by mouth Daily.   • losartan (COZAAR) 100 MG tablet Take 100 mg by mouth Daily.   • metoprolol succinate XL (TOPROL-XL) 50 MG 24 hr tablet Take 50 mg by mouth 2 (two) times a day.   • mupirocin (BACTROBAN) 2 % ointment APPLY TOPICALLY TO THE AFFECTED AREA TWICE A DAY FOR 5 DAYS   • Netarsudil-Latanoprost (Rocklatan) 0.02-0.005 % solution Apply 1 drop to eye(s) as directed by provider Every  "Night.   • omeprazole (priLOSEC) 40 MG capsule omeprazole 40 mg capsule,delayed release   • rOPINIRole (REQUIP) 4 MG tablet Take 4 mg by mouth 2 (Two) Times a Day. Pt notes she takes one tablet in the morning and 3 tablets at bedtime.   • sertraline (ZOLOFT) 100 MG tablet Take 100 mg by mouth Daily.   • spironolactone (ALDACTONE) 25 MG tablet Take 1 tablet by mouth Daily.     No current facility-administered medications on file prior to visit.         Objective   /59   Pulse 77   Resp 16   Ht 165.1 cm (65\")   Wt 95.3 kg (210 lb)   BMI 34.95 kg/m²       Physical Exam  Constitutional:       Appearance: She is obese.   HENT:      Head: Normocephalic.   Neck:      Vascular: No carotid bruit.   Cardiovascular:      Rate and Rhythm: Normal rate and regular rhythm.      Pulses: Normal pulses.      Heart sounds: Normal heart sounds. No murmur heard.  Pulmonary:      Effort: Pulmonary effort is normal.      Breath sounds: Normal breath sounds.   Musculoskeletal:      Cervical back: Neck supple.      Right lower le+ Pitting Edema present.      Left lower le+ Pitting Edema present.   Skin:     General: Skin is dry.      Capillary Refill: Capillary refill takes less than 2 seconds.   Neurological:      Mental Status: She is alert and oriented to person, place, and time.   Psychiatric:         Behavior: Behavior normal.       Result Review :   The following data was reviewed by: OLIVER Lovelace on 2022:  proBNP   Date Value Ref Range Status   2021 496.4 0.0 - 900.0 pg/mL Final     CMP    CMP 21   Glucose 97    BUN 50 (A)    Creatinine 1.63 (A) 1.90   eGFR Non African Am 31 (A)    Sodium 146 (A)    Potassium 4.8    Chloride 111 (A)    Calcium 9.3    (A) Abnormal value       Comments are available for some flowsheets but are not being displayed.           CBC w/diff    CBC w/Diff 22   Platelets 535 (A)   (A) Abnormal value             Lab Results   Component Value Date    TSH " 3.660 07/16/2021      Lab Results   Component Value Date    FREET4 1.17 07/16/2021      No results found for: DDIMERQUANT  No results found for: MG   No results found for: DIGOXIN   Lab Results   Component Value Date    TROPONINT 0.02 08/29/2019           Results for orders placed in visit on 08/31/21    Adult Transthoracic Echo Complete W/ Cont if Necessary Per Protocol    Interpretation Summary  · Calculated left ventricular EF = 64% Estimated left ventricular EF was in agreement with the calculated left ventricular EF.  · Left ventricular diastolic function is consistent with (grade Ia w/high LAP) impaired relaxation.       Assessment and Plan    Diagnoses and all orders for this visit:    1. Chronic HFrEF nonischemic (Primary)  Edema and dyspnea with exertion now worsening, advised to cut back on fluid intake throughout the day. She will attempt to cut back to 60 ounces per day. She will also start watching her sodium intake, no more than 140 mg per serving size. She will take her lasix 80 mg daily for the next 5 days then go back to every other day. She will monitor her weight daily as well.    2. Essential hypertension  Currently controlled, we discussed low sodium diet. Continue hydrochlorothiazide 25 mg daily, losartan 100 mg daily, metoprolol 50 mg twice daily, and spironolactone 25 mg daily. Check blood pressure twice a day for the next two weeks, blood pressure log provided for patient. Will review log once available to me and will make any necessary medication changes at that time.    3. Hyperlipidemia LDL goal <100  Last lipid panel was 06/28/19 with LDL of 102 which is slightly outside of goal range. She is going to make some dietary modifications and have lipid panel rechecked with next PCP visit.    4. Presence of biventricular implantable cardioverter-defibrillator (ICD)  Last home remote session was 5/18/22, shows no acute episodes and a little over 1 year left on the battery. She will have device  interrogation in 2 months.          Follow Up   Return in about 6 months (around 12/2/2022) for Follow up with Dr Bernal.    Patient was given instructions and counseling regarding her condition or for health maintenance advice. Please see specific information pulled into the AVS if appropriate.     Deisy Lama  reports that she has never smoked. She has never used smokeless tobacco.         Shefali Chavez, OLIVER  06/02/22  14:12 EDT    Dictated Utilizing Dragon Dictation

## 2022-06-06 ENCOUNTER — TELEPHONE (OUTPATIENT)
Dept: VASCULAR SURGERY | Facility: HOSPITAL | Age: 72
End: 2022-06-06

## 2022-06-06 NOTE — TELEPHONE ENCOUNTER
Called Mrs. Lama and informed her the CT revealed the AAA at 4 cm which was consistent to the previous studies. She may follow up in one year with a repeat abdominal ultrasound. She acknowledges, I answered her questions and she is in agreement with the plan.

## 2022-06-10 ENCOUNTER — TELEPHONE (OUTPATIENT)
Dept: ORTHOPEDIC SURGERY | Facility: CLINIC | Age: 72
End: 2022-06-10

## 2022-06-10 NOTE — TELEPHONE ENCOUNTER
Caller: BILL   Relationship to Patient: SELF     Phone Number: [phone]  Reason for Call: PATIENT CALLING STATING DR WEAVER GAVE HER A HIP INJECTION AND SHE WOULD LIKE TO KNOW WHEN SHE CAN GET ANOTHER ONE, COULD NOT FIND INJECTION IN HER CHART

## 2022-06-17 ENCOUNTER — TELEPHONE (OUTPATIENT)
Dept: CARDIOLOGY | Facility: CLINIC | Age: 72
End: 2022-06-17

## 2022-06-17 NOTE — TELEPHONE ENCOUNTER
Received VM from patient wanting blood work faxed to [facility].    SW patient advised did not see blood work was needed. Patient voiced understanding. Patient to bring BP log in on Monday.

## 2022-07-31 ENCOUNTER — HOSPITAL ENCOUNTER (EMERGENCY)
Facility: HOSPITAL | Age: 72
Discharge: HOME | End: 2022-07-31
Attending: EMERGENCY MEDICINE | Admitting: EMERGENCY MEDICINE

## 2022-07-31 VITALS
TEMPERATURE: 97.7 F | DIASTOLIC BLOOD PRESSURE: 64 MMHG | BODY MASS INDEX: 37.3 KG/M2 | HEART RATE: 67 BPM | HEIGHT: 64 IN | SYSTOLIC BLOOD PRESSURE: 156 MMHG | RESPIRATION RATE: 18 BRPM | OXYGEN SATURATION: 95 % | WEIGHT: 218.48 LBS

## 2022-07-31 DIAGNOSIS — E16.2 HYPOGLYCEMIA: Primary | ICD-10-CM

## 2022-07-31 LAB
ALBUMIN SERPL-MCNC: 4.3 G/DL (ref 3.5–5.2)
ALBUMIN/GLOB SERPL: 1.4 G/DL
ALP SERPL-CCNC: 108 U/L (ref 39–117)
ALT SERPL W P-5'-P-CCNC: 20 U/L (ref 1–33)
ANION GAP SERPL CALCULATED.3IONS-SCNC: 9.6 MMOL/L (ref 5–15)
AST SERPL-CCNC: 24 U/L (ref 1–32)
BASOPHILS # BLD AUTO: 0.13 10*3/MM3 (ref 0–0.2)
BASOPHILS NFR BLD AUTO: 0.8 % (ref 0–1.5)
BILIRUB SERPL-MCNC: 0.3 MG/DL (ref 0–1.2)
BUN SERPL-MCNC: 47 MG/DL (ref 8–23)
BUN/CREAT SERPL: 21.7 (ref 7–25)
CALCIUM SPEC-SCNC: 9.5 MG/DL (ref 8.6–10.5)
CHLORIDE SERPL-SCNC: 109 MMOL/L (ref 98–107)
CO2 SERPL-SCNC: 26.4 MMOL/L (ref 22–29)
CREAT SERPL-MCNC: 2.17 MG/DL (ref 0.57–1)
DEPRECATED RDW RBC AUTO: 47.8 FL (ref 37–54)
EGFRCR SERPLBLD CKD-EPI 2021: 23.4 ML/MIN/1.73
EOSINOPHIL # BLD AUTO: 0.43 10*3/MM3 (ref 0–0.4)
EOSINOPHIL NFR BLD AUTO: 2.8 % (ref 0.3–6.2)
ERYTHROCYTE [DISTWIDTH] IN BLOOD BY AUTOMATED COUNT: 14 % (ref 12.3–15.4)
GLOBULIN UR ELPH-MCNC: 3.1 GM/DL
GLUCOSE BLDC GLUCOMTR-MCNC: 102 MG/DL (ref 70–99)
GLUCOSE BLDC GLUCOMTR-MCNC: 95 MG/DL (ref 70–99)
GLUCOSE SERPL-MCNC: 106 MG/DL (ref 65–99)
HCT VFR BLD AUTO: 43.5 % (ref 34–46.6)
HGB BLD-MCNC: 13.8 G/DL (ref 12–15.9)
HOLD SPECIMEN: NORMAL
HOLD SPECIMEN: NORMAL
IMM GRANULOCYTES # BLD AUTO: 0.05 10*3/MM3 (ref 0–0.05)
IMM GRANULOCYTES NFR BLD AUTO: 0.3 % (ref 0–0.5)
LYMPHOCYTES # BLD AUTO: 2.37 10*3/MM3 (ref 0.7–3.1)
LYMPHOCYTES NFR BLD AUTO: 15.2 % (ref 19.6–45.3)
MCH RBC QN AUTO: 29.3 PG (ref 26.6–33)
MCHC RBC AUTO-ENTMCNC: 31.7 G/DL (ref 31.5–35.7)
MCV RBC AUTO: 92.4 FL (ref 79–97)
MONOCYTES # BLD AUTO: 1.09 10*3/MM3 (ref 0.1–0.9)
MONOCYTES NFR BLD AUTO: 7 % (ref 5–12)
NEUTROPHILS NFR BLD AUTO: 11.51 10*3/MM3 (ref 1.7–7)
NEUTROPHILS NFR BLD AUTO: 73.9 % (ref 42.7–76)
NRBC BLD AUTO-RTO: 0 /100 WBC (ref 0–0.2)
PLATELET # BLD AUTO: 514 10*3/MM3 (ref 140–450)
PMV BLD AUTO: 10 FL (ref 6–12)
POTASSIUM SERPL-SCNC: 4.3 MMOL/L (ref 3.5–5.2)
PROT SERPL-MCNC: 7.4 G/DL (ref 6–8.5)
RBC # BLD AUTO: 4.71 10*6/MM3 (ref 3.77–5.28)
SODIUM SERPL-SCNC: 145 MMOL/L (ref 136–145)
WBC NRBC COR # BLD: 15.58 10*3/MM3 (ref 3.4–10.8)
WHOLE BLOOD HOLD COAG: NORMAL
WHOLE BLOOD HOLD SPECIMEN: NORMAL

## 2022-07-31 PROCEDURE — 82962 GLUCOSE BLOOD TEST: CPT

## 2022-07-31 PROCEDURE — 96374 THER/PROPH/DIAG INJ IV PUSH: CPT

## 2022-07-31 PROCEDURE — 85025 COMPLETE CBC W/AUTO DIFF WBC: CPT | Performed by: EMERGENCY MEDICINE

## 2022-07-31 PROCEDURE — 80053 COMPREHEN METABOLIC PANEL: CPT | Performed by: EMERGENCY MEDICINE

## 2022-07-31 PROCEDURE — 99283 EMERGENCY DEPT VISIT LOW MDM: CPT

## 2022-07-31 RX ORDER — DEXTROSE MONOHYDRATE 25 G/50ML
INJECTION, SOLUTION INTRAVENOUS
Status: COMPLETED
Start: 2022-07-31 | End: 2022-07-31

## 2022-07-31 RX ORDER — DEXTROSE MONOHYDRATE 25 G/50ML
25 INJECTION, SOLUTION INTRAVENOUS ONCE
Status: COMPLETED | OUTPATIENT
Start: 2022-07-31 | End: 2022-07-31

## 2022-07-31 RX ORDER — HYDRALAZINE HYDROCHLORIDE 20 MG/ML
20 INJECTION INTRAMUSCULAR; INTRAVENOUS ONCE
Status: DISCONTINUED | OUTPATIENT
Start: 2022-07-31 | End: 2022-08-01 | Stop reason: HOSPADM

## 2022-07-31 RX ORDER — SODIUM CHLORIDE 0.9 % (FLUSH) 0.9 %
10 SYRINGE (ML) INJECTION AS NEEDED
Status: DISCONTINUED | OUTPATIENT
Start: 2022-07-31 | End: 2022-08-01 | Stop reason: HOSPADM

## 2022-07-31 RX ADMIN — DEXTROSE MONOHYDRATE 25 G: 25 INJECTION, SOLUTION INTRAVENOUS at 19:34

## 2022-08-01 NOTE — ED NOTES
Pt refused follow up blood sugar check and also refused BP medication, stating that she just wants to go home.

## 2022-08-01 NOTE — ED PROVIDER NOTES
Time: 8:28 PM EDT  Arrived by: private car  Chief Complaint: Hypoglycemia  History provided by: patient  History is limited by: N/A     History of Present Illness:  Patient is a 72 y.o. year old female who presents to the emergency department with hypoglycemia for a couple of days. She reports not being able to remember things and not knowing where she is. She was on the floor for a day and a half and couldn't get up. Pt was confused and couldn't taking any of her medicine. She crawled to her phone today and called her family member to bring her to the ED today. She feels better now since her blood sugar is stable now. Pt denies unable to move one side of her body. Pt feels denies headache. Pt denies problems surrounding urination.        used: No        Similar Symptoms Previously:   Recently seen:       Patient Care Team  Primary Care Provider: Aayush Priest MD    Past Medical History:     Allergies   Allergen Reactions   • Dilaudid [Hydromorphone] Confusion   • Lisinopril Cough   • Morphine Itching     Past Medical History:   Diagnosis Date   • AAA (abdominal aortic aneurysm) ([facility])     Pt reported   • Acid reflux    • Aftercare following right knee joint replacement surgery 04/25/2018   • Arthritis    • Bladder disorder    • Blood disease    • Congestive heart failure (CHF) ([facility])    • 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   • Heart disease    • Hyperlipemia    • Hyperlipidemia LDL goal <100 8/23/2021   • Hypertension    • Kidney stone    • Limb swelling    • Presence of biventricular implantable cardioverter-defibrillator (ICD) 8/23/2021   • Primary osteoarthritis of left knee 05/02/2018   • Primary osteoarthritis of right knee 02/15/2018   • Shortness of breath    • Stomach disorder    • Thyroid disorder    • Ulcer (traumatic) of oral mucosa      Past Surgical History:   Procedure Laterality Date   • APPENDECTOMY     • BLADDER SURGERY     • CARDIAC DEFIBRILLATOR PLACEMENT     • CARDIAC SURGERY      • CATARACT EXTRACTION      WITH LENS IMPLANT   • CHOLECYSTECTOMY     • COLONOSCOPY     • EYE LENS IMPLANT SECONDARY     • GALLBLADDER SURGERY     • HEEL SPUR SURGERY     • HERNIA REPAIR     • HYSTERECTOMY     • KIDNEY STONE SURGERY     • OTHER SURGICAL HISTORY      SPLEEN REMOVAL   • PACEMAKER IMPLANTATION     • TONSILLECTOMY       Family History   Problem Relation Age of Onset   • Stroke Mother    • Diabetes Mother    • Arthritis Mother    • Osteoporosis Mother    • Heart disease Father    • Cancer Father         PROSTATE   • Arthritis Father    • Osteoporosis Father    • Diabetes Brother    • Cancer Son        Home Medications:  Prior to Admission medications    Medication Sig Start Date End Date Taking? Authorizing Provider   Accu-Chek Evelina Plus test strip 1 each by Other route Daily. 1/28/22   Polina Falk MD   aspirin 81 MG EC tablet Take 81 mg by mouth Daily.    Polina Falk MD   atorvastatin (LIPITOR) 10 MG tablet Take 10 mg by mouth Daily. 7/20/21   Polina Falk MD   BIOTIN PO Take  by mouth.    Polina Falk MD   brimonidine (ALPHAGAN) 0.2 % ophthalmic solution  11/1/21   Polina Falk MD   diclofenac (VOLTAREN) 75 MG EC tablet Take 75 mg by mouth 2 (Two) Times a Day. 8/6/21   oPlina Falk MD   dorzolamide (TRUSOPT) 2 % ophthalmic solution  11/12/21   Polina Falk MD   furosemide (LASIX) 80 MG tablet Take 1 tablet by mouth Daily.  Patient taking differently: Take 80 mg by mouth Every Other Day. 8/26/21   Michael Bernal MD   glimepiride (AMARYL) 2 MG tablet Take 2 mg by mouth Every Morning Before Breakfast. 10/8/21   Polina Falk MD   hydroCHLOROthiazide (HYDRODIURIL) 25 MG tablet Take 25 mg by mouth Daily. 5/3/21   Polina Falk MD   latanoprost (XALATAN) 0.005 % ophthalmic solution  11/1/21   Polina Falk MD   levothyroxine (SYNTHROID, LEVOTHROID) 112 MCG tablet Take 112 mcg by mouth Daily. 5/3/21   Deya  MD oPlina   losartan (COZAAR) 100 MG tablet Take 100 mg by mouth Daily. 7/14/21   Polina Falk MD   metoprolol succinate XL (TOPROL-XL) 50 MG 24 hr tablet Take 50 mg by mouth 2 (two) times a day.    Polina Falk MD   mupirocin (BACTROBAN) 2 % ointment APPLY TOPICALLY TO THE AFFECTED AREA TWICE A DAY FOR 5 DAYS 12/15/21   Polina Falk MD   Netarsudil-Latanoprost (Rocklatan) 0.02-0.005 % solution Apply 1 drop to eye(s) as directed by provider Every Night.    Polina Falk MD   omeprazole (priLOSEC) 40 MG capsule omeprazole 40 mg capsule,delayed release 5/3/21   Polina Falk MD   rOPINIRole (REQUIP) 4 MG tablet Take 4 mg by mouth 2 (Two) Times a Day. Pt notes she takes one tablet in the morning and 3 tablets at bedtime. 11/4/21   Polina Falk MD   sertraline (ZOLOFT) 100 MG tablet Take 100 mg by mouth Daily. 7/6/21   Polina Falk MD   spironolactone (ALDACTONE) 25 MG tablet Take 1 tablet by mouth Daily. 8/26/21   Michael Bernal MD        Social History:   Social History     Tobacco Use   • Smoking status: Never Smoker   • Smokeless tobacco: Never Used   Vaping Use   • Vaping Use: Never used   Substance Use Topics   • Alcohol use: Never   • Drug use: Never     Recent travel: not applicable     Review of Systems:  Review of Systems   Constitutional: Negative for chills and fever.   HENT: Negative for congestion, rhinorrhea and sore throat.    Eyes: Negative for pain and visual disturbance.   Respiratory: Negative for apnea, cough, chest tightness and shortness of breath.    Cardiovascular: Negative for chest pain and palpitations.   Gastrointestinal: Negative for abdominal pain, diarrhea, nausea and vomiting.   Genitourinary: Negative for difficulty urinating and dysuria.   Musculoskeletal: Negative for joint swelling and myalgias.   Skin: Negative for color change.   Neurological: Positive for weakness. Negative for seizures and headaches.  "  Psychiatric/Behavioral: Positive for confusion.   All other systems reviewed and are negative.       Physical Exam:  /64   Pulse 67   Temp 97.7 °F (36.5 °C) (Oral)   Resp 18   Ht 162.6 cm (64\")   Wt 99.1 kg (218 lb 7.6 oz)   SpO2 95%   BMI 37.50 kg/m²     Physical Exam  Vitals and nursing note reviewed.   Constitutional:       General: She is awake.      Appearance: Normal appearance.   HENT:      Head: Normocephalic and atraumatic.      Nose: Nose normal.      Mouth/Throat:      Mouth: Mucous membranes are moist.   Eyes:      Extraocular Movements: Extraocular movements intact.      Pupils: Pupils are equal, round, and reactive to light.   Cardiovascular:      Rate and Rhythm: Normal rate and regular rhythm.      Heart sounds: Normal heart sounds.   Pulmonary:      Effort: Pulmonary effort is normal. No respiratory distress.      Breath sounds: Normal breath sounds. No wheezing, rhonchi or rales.   Abdominal:      General: Bowel sounds are normal.      Palpations: Abdomen is soft.      Tenderness: There is no abdominal tenderness. There is no guarding or rebound.      Comments: No rigidity   Musculoskeletal:         General: No tenderness. Normal range of motion.      Cervical back: Normal range of motion and neck supple.   Skin:     General: Skin is warm and dry.      Coloration: Skin is not jaundiced.   Neurological:      General: No focal deficit present.      Mental Status: She is alert and oriented to person, place, and time. Mental status is at baseline.      Sensory: Sensation is intact.      Motor: Motor function is intact.      Coordination: Coordination is intact.   Psychiatric:         Attention and Perception: Attention and perception normal.         Mood and Affect: Mood and affect normal.         Speech: Speech normal.         Behavior: Behavior normal.         Judgment: Judgment normal.                Medications in the Emergency Department:  Medications   dextrose (D50W) (25 g/50 mL) " IV injection 25 g (25 g Intravenous Given 7/31/22 1934)        Labs  Lab Results (last 24 hours)     Procedure Component Value Units Date/Time    CBC & Differential [[id number]]  (Abnormal) Collected: 07/31/22 1932    Specimen: Blood Updated: 07/31/22 1952    Narrative:      The following orders were created for panel order CBC & Differential.  Procedure                               Abnormality         Status                     ---------                               -----------         ------                     CBC Auto Differential[[id number]]        Abnormal            Final result                 Please view results for these tests on the individual orders.    Comprehensive Metabolic Panel [[id number]]  (Abnormal) Collected: 07/31/22 1932    Specimen: Blood Updated: 07/31/22 2009     Glucose 106 mg/dL      BUN 47 mg/dL      Creatinine 2.17 mg/dL      Sodium 145 mmol/L      Potassium 4.3 mmol/L      Chloride 109 mmol/L      CO2 26.4 mmol/L      Calcium 9.5 mg/dL      Total Protein 7.4 g/dL      Albumin 4.30 g/dL      ALT (SGPT) 20 U/L      AST (SGOT) 24 U/L      Alkaline Phosphatase 108 U/L      Total Bilirubin 0.3 mg/dL      Globulin 3.1 gm/dL      A/G Ratio 1.4 g/dL      BUN/Creatinine Ratio 21.7     Anion Gap 9.6 mmol/L      eGFR 23.4 mL/min/1.73      Comment: National Kidney Foundation and American Society of Nephrology (ASN) Task Force recommended calculation based on the Chronic Kidney Disease Epidemiology Collaboration (CKD-EPI) equation refit without adjustment for race.       Narrative:      GFR Normal >60  Chronic Kidney Disease <60  Kidney Failure <15      CBC Auto Differential [[id number]]  (Abnormal) Collected: 07/31/22 1932    Specimen: Blood Updated: 07/31/22 1952     WBC 15.58 10*3/mm3      RBC 4.71 10*6/mm3      Hemoglobin 13.8 g/dL      Hematocrit 43.5 %      MCV 92.4 fL      MCH 29.3 pg      MCHC 31.7 g/dL      RDW 14.0 %      RDW-SD 47.8 fl      MPV 10.0 fL      Platelets 514 10*3/mm3       Neutrophil % 73.9 %      Lymphocyte % 15.2 %      Monocyte % 7.0 %      Eosinophil % 2.8 %      Basophil % 0.8 %      Immature Grans % 0.3 %      Neutrophils, Absolute 11.51 10*3/mm3      Lymphocytes, Absolute 2.37 10*3/mm3      Monocytes, Absolute 1.09 10*3/mm3      Eosinophils, Absolute 0.43 10*3/mm3      Basophils, Absolute 0.13 10*3/mm3      Immature Grans, Absolute 0.05 10*3/mm3      nRBC 0.0 /100 WBC     POC Glucose Once [[id number]]  (Abnormal) Collected: 07/31/22 1936    Specimen: Blood Updated: 07/31/22 2220     Glucose 102 mg/dL      Comment: Serial Number: [id number]:  [id number]       POC Glucose Once [[id number]]  (Normal) Collected: 07/31/22 2039    Specimen: Blood Updated: 07/31/22 2220     Glucose 95 mg/dL      Comment: Serial Number: [id number]:  [id number]              Imaging:  No Radiology Exams Resulted Within Past 24 Hours    Procedures:  Procedures    Progress  ED Course as of 08/01/22 0147   Sun Jul 31, 2022 2204 Alerted by nursing staff patient refusing hydralazine, refusing repeated blood sugar check and demanding discharge. [RP]      ED Course User Index  [RP] Castro Lloyd MD                            Medical Decision Making:  MDM  Number of Diagnoses or Management Options  Hypoglycemia: new and requires workup     Amount and/or Complexity of Data Reviewed  Clinical lab tests: reviewed  Decide to obtain previous medical records or to obtain history from someone other than the patient: yes  Independent visualization of images, tracings, or specimens: yes    Risk of Complications, Morbidity, and/or Mortality  Presenting problems: moderate  Management options: low    Patient Progress  Patient progress: improved       Final diagnoses:   Hypoglycemia        Disposition:  ED Disposition     ED Disposition   Discharge    Condition   Stable    Comment   --             This medical record created using voice recognition software.             Sophia Del Angel  07/31/22 2037        Castro Lloyd MD  08/01/22 0147

## 2022-08-03 ENCOUNTER — CLINICAL SUPPORT NO REQUIREMENTS (OUTPATIENT)
Dept: CARDIOLOGY | Facility: CLINIC | Age: 72
End: 2022-08-03

## 2022-08-03 DIAGNOSIS — I44.2 CHB (COMPLETE HEART BLOCK): ICD-10-CM

## 2022-08-03 DIAGNOSIS — Z95.810 PRESENCE OF BIVENTRICULAR IMPLANTABLE CARDIOVERTER-DEFIBRILLATOR (ICD): Primary | ICD-10-CM

## 2022-08-03 LAB — GLUCOSE BLDC GLUCOMTR-MCNC: 28 MG/DL (ref 70–99)

## 2022-08-03 PROCEDURE — 93284 PRGRMG EVAL IMPLANTABLE DFB: CPT | Performed by: INTERNAL MEDICINE

## 2022-08-03 NOTE — PROGRESS NOTES
Normal BI-V ICD Device Interrogation and Device Testing.  Normal evaluation of device function and lead measurements.  No optimization was needed of parameters or maximization of device longevity.  Patient is on automated Home Remote Monitoring. Remaining battery is 15 months, she is pacemaker dependent.

## 2022-08-10 RX ORDER — AMITRIPTYLINE HYDROCHLORIDE 25 MG/1
TABLET, FILM COATED ORAL
COMMUNITY
Start: 2022-07-20 | End: 2022-08-24

## 2022-08-10 RX ORDER — HYDROCODONE BITARTRATE AND ACETAMINOPHEN 10; 325 MG/1; MG/1
1 TABLET ORAL EVERY 12 HOURS PRN
COMMUNITY
Start: 2022-07-23 | End: 2023-01-27 | Stop reason: HOSPADM

## 2022-08-10 RX ORDER — AMITRIPTYLINE HYDROCHLORIDE 25 MG/1
1 TABLET, FILM COATED ORAL
COMMUNITY
Start: 2022-07-20 | End: 2022-08-24

## 2022-08-11 ENCOUNTER — OFFICE VISIT (OUTPATIENT)
Dept: SURGERY | Facility: CLINIC | Age: 72
End: 2022-08-11

## 2022-08-11 VITALS — RESPIRATION RATE: 17 BRPM | BODY MASS INDEX: 37.22 KG/M2 | HEIGHT: 64 IN | WEIGHT: 218 LBS

## 2022-08-11 DIAGNOSIS — K40.90 INGUINAL HERNIA WITHOUT OBSTRUCTION OR GANGRENE, RECURRENCE NOT SPECIFIED, UNSPECIFIED LATERALITY: Primary | ICD-10-CM

## 2022-08-11 PROBLEM — L72.3 SEBACEOUS CYST: Status: ACTIVE | Noted: 2022-08-11

## 2022-08-11 PROCEDURE — 99212 OFFICE O/P EST SF 10 MIN: CPT | Performed by: SURGERY

## 2022-08-11 NOTE — PROGRESS NOTES
Chief Complaint: Cyst    Subjective         History of Present Illness  Deisy Lama is a 72 y.o. female presents to [facility] to be seen for lump in groin that on exam appears to be hernia.  She is having pain with this and it is obvious on exam.  She has no obstructive symptoms.      Objective     Past Medical History:   Diagnosis Date   • AAA (abdominal aortic aneurysm) ([facility])     Pt reported   • Acid reflux    • Aftercare following right knee joint replacement surgery 04/25/2018   • Arthritis    • Bladder disorder    • Blood disease    • Congestive heart failure (CHF) ([facility])    • 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   • Heart disease    • Hyperlipemia    • Hyperlipidemia LDL goal <100 8/23/2021   • Hypertension    • Kidney stone    • Limb swelling    • Presence of biventricular implantable cardioverter-defibrillator (ICD) 8/23/2021   • Primary osteoarthritis of left knee 05/02/2018   • Primary osteoarthritis of right knee 02/15/2018   • Shortness of breath    • Stomach disorder    • Thyroid disorder    • Ulcer (traumatic) of oral mucosa        Past Surgical History:   Procedure Laterality Date   • APPENDECTOMY     • BLADDER SURGERY     • CARDIAC DEFIBRILLATOR PLACEMENT     • CARDIAC SURGERY     • CATARACT EXTRACTION      WITH LENS IMPLANT   • CHOLECYSTECTOMY     • COLONOSCOPY     • EYE LENS IMPLANT SECONDARY     • GALLBLADDER SURGERY     • HEEL SPUR SURGERY     • HERNIA REPAIR     • HYSTERECTOMY     • KIDNEY STONE SURGERY     • OTHER SURGICAL HISTORY      SPLEEN REMOVAL   • PACEMAKER IMPLANTATION     • TONSILLECTOMY           Current Outpatient Medications:   •  Accu-Chek Evelina Plus test strip, 1 each by Other route Daily., Disp: , Rfl:   •  amitriptyline (ELAVIL) 25 MG tablet, 1 tablet every night at bedtime., Disp: , Rfl:   •  amitriptyline (ELAVIL) 25 MG tablet, TAKE 1 TABLET BY MOUTH AT BEDTIME FOR 30 DAYS, Disp: , Rfl:   •  aspirin 81 MG EC tablet, Take 81 mg by mouth Daily.,  Disp: , Rfl:   •  atorvastatin (LIPITOR) 10 MG tablet, Take 10 mg by mouth Daily., Disp: , Rfl:   •  BIOTIN PO, Take  by mouth., Disp: , Rfl:   •  brimonidine (ALPHAGAN) 0.2 % ophthalmic solution, , Disp: , Rfl:   •  diclofenac (VOLTAREN) 75 MG EC tablet, Take 75 mg by mouth 2 (Two) Times a Day., Disp: , Rfl:   •  dorzolamide (TRUSOPT) 2 % ophthalmic solution, , Disp: , Rfl:   •  furosemide (LASIX) 80 MG tablet, Take 1 tablet by mouth Daily. (Patient taking differently: Take 80 mg by mouth Every Other Day.), Disp: 90 tablet, Rfl: 3  •  glimepiride (AMARYL) 2 MG tablet, Take 2 mg by mouth Every Morning Before Breakfast., Disp: , Rfl:   •  hydroCHLOROthiazide (HYDRODIURIL) 25 MG tablet, Take 25 mg by mouth Daily., Disp: , Rfl:   •  HYDROcodone-acetaminophen (NORCO)  MG per tablet, Take 1 tablet by mouth Every 12 (Twelve) Hours As Needed., Disp: , Rfl:   •  latanoprost (XALATAN) 0.005 % ophthalmic solution, , Disp: , Rfl:   •  levothyroxine (SYNTHROID, LEVOTHROID) 112 MCG tablet, Take 112 mcg by mouth Daily., Disp: , Rfl:   •  losartan (COZAAR) 100 MG tablet, Take 100 mg by mouth Daily., Disp: , Rfl:   •  metoprolol succinate XL (TOPROL-XL) 50 MG 24 hr tablet, Take 50 mg by mouth 2 (two) times a day., Disp: , Rfl:   •  mupirocin (BACTROBAN) 2 % ointment, APPLY TOPICALLY TO THE AFFECTED AREA TWICE A DAY FOR 5 DAYS, Disp: , Rfl:   •  Netarsudil-Latanoprost (Rocklatan) 0.02-0.005 % solution, Apply 1 drop to eye(s) as directed by provider Every Night., Disp: , Rfl:   •  omeprazole (priLOSEC) 40 MG capsule, omeprazole 40 mg capsule,delayed release, Disp: , Rfl:   •  rOPINIRole (REQUIP) 4 MG tablet, Take 4 mg by mouth 2 (Two) Times a Day. Pt notes she takes one tablet in the morning and 3 tablets at bedtime., Disp: , Rfl:   •  sertraline (ZOLOFT) 100 MG tablet, Take 100 mg by mouth Daily., Disp: , Rfl:   •  spironolactone (ALDACTONE) 25 MG tablet, Take 1 tablet by mouth Daily., Disp: 90 tablet, Rfl: 3    Allergies  "  Allergen Reactions   • Dilaudid [Hydromorphone] Confusion   • Lisinopril Cough   • Morphine Itching        Family History   Problem Relation Age of Onset   • Stroke Mother    • Diabetes Mother    • Arthritis Mother    • Osteoporosis Mother    • Heart disease Father    • Cancer Father         PROSTATE   • Arthritis Father    • Osteoporosis Father    • Diabetes Brother    • Cancer Son        Social History     Socioeconomic History   • Marital status:    Tobacco Use   • Smoking status: Never Smoker   • Smokeless tobacco: Never Used   Vaping Use   • Vaping Use: Never used   Substance and Sexual Activity   • Alcohol use: Never   • Drug use: Never   • Sexual activity: Defer       Vital Signs:   Resp 17   Ht 162.6 cm (64\")   Wt 98.9 kg (218 lb)   BMI 37.42 kg/m²    Review of Systems    Physical Exam  Vitals and nursing note reviewed.   Constitutional:       General: She is not in acute distress.     Appearance: Normal appearance. She is well-developed.   HENT:      Head: Normocephalic and atraumatic.   Eyes:      Extraocular Movements: Extraocular movements intact.      Pupils: Pupils are equal, round, and reactive to light.   Cardiovascular:      Pulses: Normal pulses.   Pulmonary:      Effort: Pulmonary effort is normal. No retractions.      Breath sounds: Normal air entry. No wheezing.   Abdominal:      General: There is no distension.      Palpations: Abdomen is soft.      Tenderness: There is no abdominal tenderness.      Hernia: A hernia is present.      Comments: Hernia in left groin easily reducable   Musculoskeletal:         General: No swelling or deformity.      Cervical back: Neck supple.   Skin:     General: Skin is warm and dry.      Findings: No erythema.   Neurological:      General: No focal deficit present.      Mental Status: She is alert and oriented to person, place, and time.      Motor: Motor function is intact.   Psychiatric:         Mood and Affect: Mood normal.         Thought " Content: Thought content normal.          Result Review :               Assessment and Plan    Diagnoses and all orders for this visit:    1. Inguinal hernia without obstruction or gangrene, recurrence not specified, unspecified laterality (Primary)    Discussed with patient that she has hernia and repair options - she would like to have done robotically so will refer her     Follow Up   Return if symptoms worsen or fail to improve.  Patient was given instructions and counseling regarding her condition or for health maintenance advice. Please see specific information pulled into the AVS if appropriate.         This document has been electronically signed by Marya Reina MD  August 11, 2022 10:24 EDT

## 2022-08-12 ENCOUNTER — OFFICE VISIT (OUTPATIENT)
Dept: SURGERY | Facility: CLINIC | Age: 72
End: 2022-08-12

## 2022-08-12 ENCOUNTER — PREP FOR SURGERY (OUTPATIENT)
Dept: OTHER | Facility: HOSPITAL | Age: 72
End: 2022-08-12

## 2022-08-12 VITALS — HEIGHT: 64 IN | RESPIRATION RATE: 16 BRPM | WEIGHT: 218.26 LBS | BODY MASS INDEX: 37.26 KG/M2

## 2022-08-12 DIAGNOSIS — K40.20 NON-RECURRENT BILATERAL INGUINAL HERNIA WITHOUT OBSTRUCTION OR GANGRENE: Primary | ICD-10-CM

## 2022-08-12 PROCEDURE — 99213 OFFICE O/P EST LOW 20 MIN: CPT | Performed by: SURGERY

## 2022-08-12 RX ORDER — CEFAZOLIN SODIUM 2 G/100ML
2 INJECTION, SOLUTION INTRAVENOUS ONCE
Status: CANCELLED | OUTPATIENT
Start: 2022-08-12 | End: 2022-08-12

## 2022-08-12 RX ORDER — ONDANSETRON 2 MG/ML
4 INJECTION INTRAMUSCULAR; INTRAVENOUS EVERY 6 HOURS PRN
Status: CANCELLED | OUTPATIENT
Start: 2022-08-12

## 2022-08-12 RX ORDER — SODIUM CHLORIDE, SODIUM LACTATE, POTASSIUM CHLORIDE, CALCIUM CHLORIDE 600; 310; 30; 20 MG/100ML; MG/100ML; MG/100ML; MG/100ML
70 INJECTION, SOLUTION INTRAVENOUS CONTINUOUS
Status: CANCELLED | OUTPATIENT
Start: 2022-08-12

## 2022-08-12 NOTE — PROGRESS NOTES
Inpatient History and Physical Surgical Orders    Preadmission Location:   Preadmission Time:  Facility:  Surgery Date:  Surgery Time:  Preadmission Test date:     Chief Complaint  Outpatient History and Physical / Surgical Orders    Primary Care Provider: Aayush Priest MD    Referring Provider: No ref. provider found    Subjective      Patient Name: Deisy Lama : 1950    HPI  The patient is a 72-year-old female that we have taken care of in the past.  She has bilateral inguinal hernias.  She also has a lot of issues with back pain and is being evaluated for spinal cord stimulator.  Is identified as having bilateral inguinal hernias on a recent CT scan.    Past History:  Medical History: has a past medical history of AAA (abdominal aortic aneurysm) ([facility]), Acid reflux, Aftercare following right knee joint replacement surgery (2018), Arthritis, Bladder disorder, Blood disease, Congestive heart failure (CHF) ([facility]), Cystourethrocele (2014), Heart disease, Hyperlipemia, Hyperlipidemia LDL goal <100 (2021), Hypertension, Inguinal hernia, Kidney stone, Limb swelling, Presence of biventricular implantable cardioverter-defibrillator (ICD) (2021), Primary osteoarthritis of left knee (2018), Primary osteoarthritis of right knee (02/15/2018), Shortness of breath, Stomach disorder, Thyroid disorder, and Ulcer (traumatic) of oral mucosa.   Surgical History: has a past surgical history that includes Appendectomy; Bladder surgery; Cardiac defibrillator placement; Cataract extraction; Cholecystectomy; Colonoscopy; Eye Lens Implant Secondary; Gallbladder surgery; Cardiac surgery; Hernia repair; Hysterectomy; Kidney stone surgery; Pacemaker Implantation; Other surgical history; Tonsillectomy; and Heel spur surgery.   Family History: family history includes Arthritis in her father and mother; Cancer in her father and son; Diabetes in her brother and mother; Heart disease in her father;  Osteoporosis in her father and mother; Stroke in her mother.   Social History: reports that she has never smoked. She has never used smokeless tobacco. She reports that she does not drink alcohol and does not use drugs.  Allergies: Dilaudid [hydromorphone], Lisinopril, and Morphine       Current Outpatient Medications:   •  Accu-Chek Evelina Plus test strip, 1 each by Other route Daily., Disp: , Rfl:   •  amitriptyline (ELAVIL) 25 MG tablet, 1 tablet every night at bedtime., Disp: , Rfl:   •  amitriptyline (ELAVIL) 25 MG tablet, TAKE 1 TABLET BY MOUTH AT BEDTIME FOR 30 DAYS, Disp: , Rfl:   •  aspirin 81 MG EC tablet, Take 81 mg by mouth Daily., Disp: , Rfl:   •  atorvastatin (LIPITOR) 10 MG tablet, Take 10 mg by mouth Daily., Disp: , Rfl:   •  BIOTIN PO, Take  by mouth., Disp: , Rfl:   •  brimonidine (ALPHAGAN) 0.2 % ophthalmic solution, , Disp: , Rfl:   •  diclofenac (VOLTAREN) 75 MG EC tablet, Take 75 mg by mouth 2 (Two) Times a Day., Disp: , Rfl:   •  dorzolamide (TRUSOPT) 2 % ophthalmic solution, , Disp: , Rfl:   •  furosemide (LASIX) 80 MG tablet, Take 1 tablet by mouth Daily. (Patient taking differently: Take 80 mg by mouth Every Other Day.), Disp: 90 tablet, Rfl: 3  •  glimepiride (AMARYL) 2 MG tablet, Take 2 mg by mouth Every Morning Before Breakfast., Disp: , Rfl:   •  hydroCHLOROthiazide (HYDRODIURIL) 25 MG tablet, Take 25 mg by mouth Daily., Disp: , Rfl:   •  HYDROcodone-acetaminophen (NORCO)  MG per tablet, Take 1 tablet by mouth Every 12 (Twelve) Hours As Needed., Disp: , Rfl:   •  latanoprost (XALATAN) 0.005 % ophthalmic solution, , Disp: , Rfl:   •  levothyroxine (SYNTHROID, LEVOTHROID) 112 MCG tablet, Take 112 mcg by mouth Daily., Disp: , Rfl:   •  losartan (COZAAR) 100 MG tablet, Take 100 mg by mouth Daily., Disp: , Rfl:   •  metoprolol succinate XL (TOPROL-XL) 50 MG 24 hr tablet, Take 50 mg by mouth 2 (two) times a day., Disp: , Rfl:   •  mupirocin (BACTROBAN) 2 % ointment, APPLY TOPICALLY TO  "THE AFFECTED AREA TWICE A DAY FOR 5 DAYS, Disp: , Rfl:   •  Netarsudil-Latanoprost (Rocklatan) 0.02-0.005 % solution, Apply 1 drop to eye(s) as directed by provider Every Night., Disp: , Rfl:   •  omeprazole (priLOSEC) 40 MG capsule, omeprazole 40 mg capsule,delayed release, Disp: , Rfl:   •  rOPINIRole (REQUIP) 4 MG tablet, Take 4 mg by mouth 2 (Two) Times a Day. Pt notes she takes one tablet in the morning and 3 tablets at bedtime., Disp: , Rfl:   •  sertraline (ZOLOFT) 100 MG tablet, Take 100 mg by mouth Daily., Disp: , Rfl:   •  spironolactone (ALDACTONE) 25 MG tablet, Take 1 tablet by mouth Daily., Disp: 90 tablet, Rfl: 3       Objective   Vital Signs:   Resp 16   Ht 162.6 cm (64.02\")   Wt 99 kg (218 lb 4.1 oz)   BMI 37.44 kg/m²       Physical Exam  Vitals and nursing note reviewed.   Constitutional:       Appearance: Normal appearance. The patient is well-developed.   Cardiovascular:      Rate and Rhythm: Normal rate and regular rhythm.   Pulmonary:      Effort: Pulmonary effort is normal.      Breath sounds: Normal air entry.   Abdominal:      General: Bowel sounds are normal.      Palpations: Abdomen is soft.      Skin:     General: Skin is warm and dry.   Neurological:      Mental Status: The patient is alert and oriented to person, place, and time.      Motor: Motor function is intact.   Psychiatric:         Mood and Affect: Mood normal.       Result Review :               Assessment and Plan   Diagnoses and all orders for this visit:    1. Non-recurrent bilateral inguinal hernia without obstruction or gangrene (Primary)    We will schedule her for a robotic bilateral inguinal hernia repair.  I described the procedure to her as well as the risk and benefits and she is agreeable to proceeding.    I  Lauri Celis MD  08/12/2022    "

## 2022-08-18 ENCOUNTER — TELEPHONE (OUTPATIENT)
Dept: SURGERY | Facility: CLINIC | Age: 72
End: 2022-08-18

## 2022-08-18 NOTE — TELEPHONE ENCOUNTER
Hub staff attempted to follow warm transfer process and was unsuccessful     Caller: Deisy Lama    Relationship to patient: Self    Best call back number: [phone]    Patient is needing: PT CALLING BACK AGAIN FOR DANIELA TO RESCHEDULE HER SX.

## 2022-08-19 RX ORDER — CEPHALEXIN 500 MG/1
CAPSULE ORAL
COMMUNITY
Start: 2022-08-17 | End: 2022-08-24

## 2022-08-19 NOTE — TELEPHONE ENCOUNTER
Per pt pain management told her she would need to get the hernia surgery before her pain stimulator placement which will be in 4-6 weeks. She stated she is in a lot of pain and would like to get this rescheduled as soon as possible. Would you be okay with doing her surgery before her pain stimulator placement?

## 2022-08-22 ENCOUNTER — TELEPHONE (OUTPATIENT)
Dept: SURGERY | Facility: CLINIC | Age: 72
End: 2022-08-22

## 2022-08-22 NOTE — TELEPHONE ENCOUNTER
Spoke to pt and rescheduled surgery to 8/31. Attempted to call pt to inform that her PAT appointment would be 8/24 at 9 am. If she calls back please relay the message.

## 2022-08-22 NOTE — TELEPHONE ENCOUNTER
I called the patient and informed her that, per dr. Celis, her surgery will be moved up.  I told her that she doesn't need to keep the appointment this afternoon, unless she wants to discuss further.  I told her Gina will call her this afternoon.    Patient said to cancel the appointment this afternoon.  She said she does want to talk to Dr. Celis about the CT.  She said she wants to have the surgery asap.  She has a lot of pain in her leg, and thinks it is from the hernia.    She also said that she only wore the stimulator for one day and the leads migrated, and they had to remove it.  She doesn't know if she will end up having the stimulator at all.    Gina, I have not cancelled the appointment.

## 2022-08-22 NOTE — TELEPHONE ENCOUNTER
Can you please inform pt we will move her surgery up per dr florian. I will call her this afternoon to reschedule her surgery and she does not have to come to her appointment this afternoon unless she just wants to discuss further.

## 2022-08-22 NOTE — TELEPHONE ENCOUNTER
SPOKE TO PT TO TRY TO R/S CX APPT FROM 8/22 WITH CHRISTIE AND PT STATED NO NEED FOR APPT/THEY ARE MOVING UP HER SURGERY/MS

## 2022-08-24 ENCOUNTER — PRE-ADMISSION TESTING (OUTPATIENT)
Dept: PREADMISSION TESTING | Facility: HOSPITAL | Age: 72
End: 2022-08-24

## 2022-08-24 VITALS
WEIGHT: 197.31 LBS | OXYGEN SATURATION: 94 % | HEART RATE: 72 BPM | DIASTOLIC BLOOD PRESSURE: 84 MMHG | BODY MASS INDEX: 33.69 KG/M2 | TEMPERATURE: 98.6 F | HEIGHT: 64 IN | RESPIRATION RATE: 16 BRPM | SYSTOLIC BLOOD PRESSURE: 142 MMHG

## 2022-08-24 RX ORDER — NETARSUDIL AND LATANOPROST OPHTHALMIC SOLUTION, 0.02%/0.005% .2; .05 MG/ML; MG/ML
1 SOLUTION/ DROPS OPHTHALMIC; TOPICAL NIGHTLY
COMMUNITY
End: 2023-01-25

## 2022-08-24 RX ORDER — ROPINIROLE 4 MG/1
12 TABLET, FILM COATED ORAL NIGHTLY
COMMUNITY

## 2022-08-25 ENCOUNTER — TELEPHONE (OUTPATIENT)
Dept: SURGERY | Facility: CLINIC | Age: 72
End: 2022-08-25

## 2022-08-25 NOTE — TELEPHONE ENCOUNTER
Marya in [facility] called to make Dr Celis aware that patient has an AAA. On the CT done in May it showed that is has increased in size. Anesthesia is aware and they are fine with proceeding with surgery. They just wanted to make sure Dr Celis knew about it.

## 2022-08-26 ENCOUNTER — ANESTHESIA EVENT (OUTPATIENT)
Dept: PERIOP | Facility: HOSPITAL | Age: 72
End: 2022-08-26

## 2022-08-31 ENCOUNTER — HOSPITAL ENCOUNTER (OUTPATIENT)
Facility: HOSPITAL | Age: 72
Setting detail: HOSPITAL OUTPATIENT SURGERY
Discharge: HOME | End: 2022-08-31
Attending: SURGERY | Admitting: SURGERY

## 2022-08-31 ENCOUNTER — ANESTHESIA (OUTPATIENT)
Dept: PERIOP | Facility: HOSPITAL | Age: 72
End: 2022-08-31

## 2022-08-31 VITALS
HEART RATE: 59 BPM | SYSTOLIC BLOOD PRESSURE: 130 MMHG | TEMPERATURE: 97.9 F | WEIGHT: 196.65 LBS | DIASTOLIC BLOOD PRESSURE: 66 MMHG | RESPIRATION RATE: 18 BRPM | HEIGHT: 65 IN | BODY MASS INDEX: 32.76 KG/M2 | OXYGEN SATURATION: 99 %

## 2022-08-31 DIAGNOSIS — K40.20 NON-RECURRENT BILATERAL INGUINAL HERNIA WITHOUT OBSTRUCTION OR GANGRENE: ICD-10-CM

## 2022-08-31 LAB
ANION GAP SERPL CALCULATED.3IONS-SCNC: 8.1 MMOL/L (ref 5–15)
BASOPHILS # BLD AUTO: 0.11 10*3/MM3 (ref 0–0.2)
BASOPHILS NFR BLD AUTO: 0.9 % (ref 0–1.5)
BUN SERPL-MCNC: 44 MG/DL (ref 8–23)
BUN/CREAT SERPL: 30.6 (ref 7–25)
CALCIUM SPEC-SCNC: 9.4 MG/DL (ref 8.6–10.5)
CHLORIDE SERPL-SCNC: 105 MMOL/L (ref 98–107)
CO2 SERPL-SCNC: 27.9 MMOL/L (ref 22–29)
CREAT SERPL-MCNC: 1.44 MG/DL (ref 0.57–1)
DEPRECATED RDW RBC AUTO: 48 FL (ref 37–54)
EGFRCR SERPLBLD CKD-EPI 2021: 38.2 ML/MIN/1.73
EOSINOPHIL # BLD AUTO: 0.62 10*3/MM3 (ref 0–0.4)
EOSINOPHIL NFR BLD AUTO: 5 % (ref 0.3–6.2)
ERYTHROCYTE [DISTWIDTH] IN BLOOD BY AUTOMATED COUNT: 14.2 % (ref 12.3–15.4)
GLUCOSE BLDC GLUCOMTR-MCNC: 85 MG/DL (ref 70–99)
GLUCOSE SERPL-MCNC: 83 MG/DL (ref 65–99)
HCT VFR BLD AUTO: 40.8 % (ref 34–46.6)
HGB BLD-MCNC: 13.1 G/DL (ref 12–15.9)
IMM GRANULOCYTES # BLD AUTO: 0.04 10*3/MM3 (ref 0–0.05)
IMM GRANULOCYTES NFR BLD AUTO: 0.3 % (ref 0–0.5)
LYMPHOCYTES # BLD AUTO: 4.2 10*3/MM3 (ref 0.7–3.1)
LYMPHOCYTES NFR BLD AUTO: 33.6 % (ref 19.6–45.3)
MCH RBC QN AUTO: 29.4 PG (ref 26.6–33)
MCHC RBC AUTO-ENTMCNC: 32.1 G/DL (ref 31.5–35.7)
MCV RBC AUTO: 91.5 FL (ref 79–97)
MONOCYTES # BLD AUTO: 1.28 10*3/MM3 (ref 0.1–0.9)
MONOCYTES NFR BLD AUTO: 10.2 % (ref 5–12)
NEUTROPHILS NFR BLD AUTO: 50 % (ref 42.7–76)
NEUTROPHILS NFR BLD AUTO: 6.26 10*3/MM3 (ref 1.7–7)
NRBC BLD AUTO-RTO: 0 /100 WBC (ref 0–0.2)
PLATELET # BLD AUTO: 431 10*3/MM3 (ref 140–450)
PMV BLD AUTO: 10 FL (ref 6–12)
POTASSIUM SERPL-SCNC: 4.2 MMOL/L (ref 3.5–5.2)
QT INTERVAL: 475 MS
RBC # BLD AUTO: 4.46 10*6/MM3 (ref 3.77–5.28)
SODIUM SERPL-SCNC: 141 MMOL/L (ref 136–145)
WBC NRBC COR # BLD: 12.51 10*3/MM3 (ref 3.4–10.8)

## 2022-08-31 PROCEDURE — 49650 LAP ING HERNIA REPAIR INIT: CPT | Performed by: SURGERY

## 2022-08-31 PROCEDURE — S2900 ROBOTIC SURGICAL SYSTEM: HCPCS | Performed by: SURGERY

## 2022-08-31 PROCEDURE — 93010 ELECTROCARDIOGRAM REPORT: CPT | Performed by: SPECIALIST

## 2022-08-31 PROCEDURE — 93005 ELECTROCARDIOGRAM TRACING: CPT | Performed by: ANESTHESIOLOGY

## 2022-08-31 PROCEDURE — 0 MEPERIDINE PER 100 MG: Performed by: NURSE ANESTHETIST, CERTIFIED REGISTERED

## 2022-08-31 PROCEDURE — 25010000002 CEFAZOLIN IN DEXTROSE 2-4 GM/100ML-% SOLUTION: Performed by: SURGERY

## 2022-08-31 PROCEDURE — 25010000002 FENTANYL CITRATE (PF) 50 MCG/ML SOLUTION: Performed by: ANESTHESIOLOGY

## 2022-08-31 PROCEDURE — 82962 GLUCOSE BLOOD TEST: CPT

## 2022-08-31 PROCEDURE — 25010000002 ONDANSETRON PER 1 MG: Performed by: NURSE ANESTHETIST, CERTIFIED REGISTERED

## 2022-08-31 PROCEDURE — 25010000002 MIDAZOLAM PER 1 MG: Performed by: ANESTHESIOLOGY

## 2022-08-31 PROCEDURE — 80048 BASIC METABOLIC PNL TOTAL CA: CPT | Performed by: ANESTHESIOLOGY

## 2022-08-31 PROCEDURE — 25010000002 FENTANYL CITRATE (PF) 50 MCG/ML SOLUTION: Performed by: NURSE ANESTHETIST, CERTIFIED REGISTERED

## 2022-08-31 PROCEDURE — 85025 COMPLETE CBC W/AUTO DIFF WBC: CPT | Performed by: ANESTHESIOLOGY

## 2022-08-31 PROCEDURE — 25010000002 DEXAMETHASONE PER 1 MG: Performed by: NURSE ANESTHETIST, CERTIFIED REGISTERED

## 2022-08-31 PROCEDURE — C1781 MESH (IMPLANTABLE): HCPCS | Performed by: SURGERY

## 2022-08-31 DEVICE — MESH PROGRIP LAP S/FIX ABS 10X15CM BX/2: Type: IMPLANTABLE DEVICE | Site: ABDOMEN | Status: FUNCTIONAL

## 2022-08-31 DEVICE — ABSORBABLE WOUND CLOSURE DEVICE
Type: IMPLANTABLE DEVICE | Site: ABDOMEN | Status: FUNCTIONAL
Brand: V-LOC 180

## 2022-08-31 RX ORDER — SODIUM CHLORIDE, SODIUM LACTATE, POTASSIUM CHLORIDE, CALCIUM CHLORIDE 600; 310; 30; 20 MG/100ML; MG/100ML; MG/100ML; MG/100ML
70 INJECTION, SOLUTION INTRAVENOUS CONTINUOUS
Status: DISCONTINUED | OUTPATIENT
Start: 2022-08-31 | End: 2022-08-31 | Stop reason: HOSPADM

## 2022-08-31 RX ORDER — EPHEDRINE SULFATE 50 MG/ML
INJECTION, SOLUTION INTRAVENOUS AS NEEDED
Status: DISCONTINUED | OUTPATIENT
Start: 2022-08-31 | End: 2022-08-31 | Stop reason: SURG

## 2022-08-31 RX ORDER — DEXAMETHASONE SODIUM PHOSPHATE 4 MG/ML
INJECTION, SOLUTION INTRA-ARTICULAR; INTRALESIONAL; INTRAMUSCULAR; INTRAVENOUS; SOFT TISSUE AS NEEDED
Status: DISCONTINUED | OUTPATIENT
Start: 2022-08-31 | End: 2022-08-31 | Stop reason: SURG

## 2022-08-31 RX ORDER — MAGNESIUM HYDROXIDE 1200 MG/15ML
LIQUID ORAL AS NEEDED
Status: DISCONTINUED | OUTPATIENT
Start: 2022-08-31 | End: 2022-08-31 | Stop reason: HOSPADM

## 2022-08-31 RX ORDER — PHENYLEPHRINE HCL IN 0.9% NACL 1 MG/10 ML
SYRINGE (ML) INTRAVENOUS AS NEEDED
Status: DISCONTINUED | OUTPATIENT
Start: 2022-08-31 | End: 2022-08-31 | Stop reason: SURG

## 2022-08-31 RX ORDER — ROCURONIUM BROMIDE 10 MG/ML
INJECTION, SOLUTION INTRAVENOUS AS NEEDED
Status: DISCONTINUED | OUTPATIENT
Start: 2022-08-31 | End: 2022-08-31 | Stop reason: SURG

## 2022-08-31 RX ORDER — ONDANSETRON 2 MG/ML
4 INJECTION INTRAMUSCULAR; INTRAVENOUS EVERY 6 HOURS PRN
Status: DISCONTINUED | OUTPATIENT
Start: 2022-08-31 | End: 2022-08-31 | Stop reason: HOSPADM

## 2022-08-31 RX ORDER — OXYCODONE HYDROCHLORIDE 5 MG/1
5 TABLET ORAL
Status: DISCONTINUED | OUTPATIENT
Start: 2022-08-31 | End: 2022-08-31 | Stop reason: HOSPADM

## 2022-08-31 RX ORDER — BUPIVACAINE HYDROCHLORIDE AND EPINEPHRINE 2.5; 5 MG/ML; UG/ML
INJECTION, SOLUTION EPIDURAL; INFILTRATION; INTRACAUDAL; PERINEURAL AS NEEDED
Status: DISCONTINUED | OUTPATIENT
Start: 2022-08-31 | End: 2022-08-31 | Stop reason: HOSPADM

## 2022-08-31 RX ORDER — FENTANYL CITRATE 50 UG/ML
INJECTION, SOLUTION INTRAMUSCULAR; INTRAVENOUS AS NEEDED
Status: DISCONTINUED | OUTPATIENT
Start: 2022-08-31 | End: 2022-08-31 | Stop reason: SURG

## 2022-08-31 RX ORDER — SUCCINYLCHOLINE/SOD CL,ISO/PF 100 MG/5ML
SYRINGE (ML) INTRAVENOUS AS NEEDED
Status: DISCONTINUED | OUTPATIENT
Start: 2022-08-31 | End: 2022-08-31 | Stop reason: SURG

## 2022-08-31 RX ORDER — ACETAMINOPHEN 500 MG
1000 TABLET ORAL ONCE
Status: DISCONTINUED | OUTPATIENT
Start: 2022-08-31 | End: 2022-08-31 | Stop reason: HOSPADM

## 2022-08-31 RX ORDER — FENTANYL CITRATE 50 UG/ML
25 INJECTION, SOLUTION INTRAMUSCULAR; INTRAVENOUS
Status: DISCONTINUED | OUTPATIENT
Start: 2022-08-31 | End: 2022-08-31 | Stop reason: HOSPADM

## 2022-08-31 RX ORDER — ONDANSETRON 2 MG/ML
INJECTION INTRAMUSCULAR; INTRAVENOUS AS NEEDED
Status: DISCONTINUED | OUTPATIENT
Start: 2022-08-31 | End: 2022-08-31 | Stop reason: SURG

## 2022-08-31 RX ORDER — ONDANSETRON 2 MG/ML
4 INJECTION INTRAMUSCULAR; INTRAVENOUS ONCE AS NEEDED
Status: DISCONTINUED | OUTPATIENT
Start: 2022-08-31 | End: 2022-08-31 | Stop reason: HOSPADM

## 2022-08-31 RX ORDER — MIDAZOLAM HYDROCHLORIDE 1 MG/ML
0.5 INJECTION INTRAMUSCULAR; INTRAVENOUS ONCE
Status: COMPLETED | OUTPATIENT
Start: 2022-08-31 | End: 2022-08-31

## 2022-08-31 RX ORDER — SODIUM CHLORIDE, SODIUM LACTATE, POTASSIUM CHLORIDE, CALCIUM CHLORIDE 600; 310; 30; 20 MG/100ML; MG/100ML; MG/100ML; MG/100ML
9 INJECTION, SOLUTION INTRAVENOUS CONTINUOUS PRN
Status: DISCONTINUED | OUTPATIENT
Start: 2022-08-31 | End: 2022-08-31 | Stop reason: HOSPADM

## 2022-08-31 RX ORDER — ONDANSETRON 4 MG/1
4 TABLET, FILM COATED ORAL ONCE AS NEEDED
Status: DISCONTINUED | OUTPATIENT
Start: 2022-08-31 | End: 2022-08-31 | Stop reason: HOSPADM

## 2022-08-31 RX ORDER — LIDOCAINE HYDROCHLORIDE 20 MG/ML
INJECTION, SOLUTION EPIDURAL; INFILTRATION; INTRACAUDAL; PERINEURAL AS NEEDED
Status: DISCONTINUED | OUTPATIENT
Start: 2022-08-31 | End: 2022-08-31 | Stop reason: SURG

## 2022-08-31 RX ORDER — PROMETHAZINE HYDROCHLORIDE 12.5 MG/1
25 TABLET ORAL ONCE AS NEEDED
Status: DISCONTINUED | OUTPATIENT
Start: 2022-08-31 | End: 2022-08-31 | Stop reason: HOSPADM

## 2022-08-31 RX ORDER — CEFAZOLIN SODIUM 2 G/100ML
2 INJECTION, SOLUTION INTRAVENOUS ONCE
Status: COMPLETED | OUTPATIENT
Start: 2022-08-31 | End: 2022-08-31

## 2022-08-31 RX ORDER — MEPERIDINE HYDROCHLORIDE 25 MG/ML
12.5 INJECTION INTRAMUSCULAR; INTRAVENOUS; SUBCUTANEOUS
Status: DISCONTINUED | OUTPATIENT
Start: 2022-08-31 | End: 2022-08-31 | Stop reason: HOSPADM

## 2022-08-31 RX ORDER — HYDROCODONE BITARTRATE AND ACETAMINOPHEN 7.5; 325 MG/1; MG/1
1 TABLET ORAL ONCE AS NEEDED
Status: DISCONTINUED | OUTPATIENT
Start: 2022-08-31 | End: 2022-08-31 | Stop reason: HOSPADM

## 2022-08-31 RX ORDER — PROMETHAZINE HYDROCHLORIDE 25 MG/1
25 SUPPOSITORY RECTAL ONCE AS NEEDED
Status: DISCONTINUED | OUTPATIENT
Start: 2022-08-31 | End: 2022-08-31 | Stop reason: HOSPADM

## 2022-08-31 RX ORDER — IBUPROFEN 600 MG/1
600 TABLET ORAL EVERY 6 HOURS PRN
Status: DISCONTINUED | OUTPATIENT
Start: 2022-08-31 | End: 2022-08-31 | Stop reason: HOSPADM

## 2022-08-31 RX ADMIN — MIDAZOLAM HYDROCHLORIDE 0.5 MG: 1 INJECTION, SOLUTION INTRAMUSCULAR; INTRAVENOUS at 11:09

## 2022-08-31 RX ADMIN — Medication 200 MCG: at 11:40

## 2022-08-31 RX ADMIN — MEPERIDINE HYDROCHLORIDE 12.5 MG: 25 INJECTION INTRAMUSCULAR; INTRAVENOUS; SUBCUTANEOUS at 13:16

## 2022-08-31 RX ADMIN — ROCURONIUM BROMIDE 45 MG: 10 INJECTION INTRAVENOUS at 11:41

## 2022-08-31 RX ADMIN — DEXAMETHASONE SODIUM PHOSPHATE 4 MG: 4 INJECTION, SOLUTION INTRA-ARTICULAR; INTRALESIONAL; INTRAMUSCULAR; INTRAVENOUS; SOFT TISSUE at 12:08

## 2022-08-31 RX ADMIN — SODIUM CHLORIDE, POTASSIUM CHLORIDE, SODIUM LACTATE AND CALCIUM CHLORIDE 9 ML/HR: 600; 310; 30; 20 INJECTION, SOLUTION INTRAVENOUS at 11:10

## 2022-08-31 RX ADMIN — Medication 200 MCG: at 11:30

## 2022-08-31 RX ADMIN — LIDOCAINE HYDROCHLORIDE 50 MG: 20 INJECTION, SOLUTION EPIDURAL; INFILTRATION; INTRACAUDAL; PERINEURAL at 11:32

## 2022-08-31 RX ADMIN — SUGAMMADEX 200 MG: 100 INJECTION, SOLUTION INTRAVENOUS at 12:52

## 2022-08-31 RX ADMIN — LIDOCAINE HYDROCHLORIDE 50 MG: 20 INJECTION, SOLUTION EPIDURAL; INFILTRATION; INTRACAUDAL; PERINEURAL at 11:28

## 2022-08-31 RX ADMIN — FENTANYL CITRATE 100 MCG: 50 INJECTION, SOLUTION INTRAMUSCULAR; INTRAVENOUS at 11:28

## 2022-08-31 RX ADMIN — EPHEDRINE SULFATE 15 MG: 50 INJECTION INTRAVENOUS at 12:44

## 2022-08-31 RX ADMIN — HYDROCODONE BITARTRATE AND ACETAMINOPHEN 1 TABLET: 7.5; 325 TABLET ORAL at 13:16

## 2022-08-31 RX ADMIN — Medication 100 MG: at 11:28

## 2022-08-31 RX ADMIN — ONDANSETRON 4 MG: 2 INJECTION INTRAMUSCULAR; INTRAVENOUS at 12:45

## 2022-08-31 RX ADMIN — Medication 200 MCG: at 12:46

## 2022-08-31 RX ADMIN — FENTANYL CITRATE 25 MCG: 50 INJECTION, SOLUTION INTRAMUSCULAR; INTRAVENOUS at 14:02

## 2022-08-31 RX ADMIN — ROCURONIUM BROMIDE 20 MG: 10 INJECTION INTRAVENOUS at 12:10

## 2022-08-31 RX ADMIN — FENTANYL CITRATE 25 MCG: 50 INJECTION, SOLUTION INTRAMUSCULAR; INTRAVENOUS at 13:48

## 2022-08-31 RX ADMIN — CEFAZOLIN SODIUM 2 G: 2 INJECTION, SOLUTION INTRAVENOUS at 11:35

## 2022-08-31 NOTE — ANESTHESIA POSTPROCEDURE EVALUATION
Patient: Deisy Lama    Procedure Summary     Date: 08/31/22 Room / Location: [facility] OR  / [facility]    Anesthesia Start: 1125 Anesthesia Stop: 1304    Procedure: INGUINAL HERNIA REPAIR LAPAROSCOPIC WITH DAVINCI ROBOT, lysis of adhesions (Bilateral Abdomen) Diagnosis:       Non-recurrent bilateral inguinal hernia without obstruction or gangrene      (Non-recurrent bilateral inguinal hernia without obstruction or gangrene [K40.20])    Surgeons: Lauri Celis MD Provider: Guido Zamorano MD    Anesthesia Type: general ASA Status: 3          Anesthesia Type: general    Vitals  Vitals Value Taken Time   /72 08/31/22 1333   Temp 36.3 °C (97.3 °F) 08/31/22 1303   Pulse 59 08/31/22 1345   Resp 20 08/31/22 1303   SpO2 94 % 08/31/22 1345   Vitals shown include unvalidated device data.        Post Anesthesia Care and Evaluation    Patient location during evaluation: bedside  Patient participation: complete - patient participated  Level of consciousness: awake  Pain management: adequate    Airway patency: patent  Anesthetic complications: No anesthetic complications  PONV Status: none  Cardiovascular status: acceptable  Respiratory status: acceptable  Hydration status: acceptable    Comments: An Anesthesiologist personally participated in the most demanding procedures (including induction and emergence if applicable) in the anesthesia plan, monitored the course of anesthesia administration at frequent intervals and remained physically present and available for immediate diagnosis and treatment of emergencies.

## 2022-08-31 NOTE — ANESTHESIA PREPROCEDURE EVALUATION
Anesthesia Evaluation     Patient summary reviewed and Nursing notes reviewed                Airway   Mallampati: II  TM distance: >3 FB  Neck ROM: full  No difficulty expected  Dental      Pulmonary - normal exam    breath sounds clear to auscultation  (+) shortness of breath,   Cardiovascular - normal exam    Rhythm: regular  Rate: normal    (+) hypertension, dysrhythmias, CHF , hyperlipidemia,       Neuro/Psych- negative ROS  GI/Hepatic/Renal/Endo    (+) obesity,  hiatal hernia, GERD,  renal disease, diabetes mellitus, thyroid problem     Musculoskeletal     Abdominal   (+) obese,    Substance History - negative use     OB/GYN negative ob/gyn ROS         Other   arthritis, blood dyscrasia,     ROS/Med Hx Other: Complete heart block  Presence of biventricular implantable cardioverter-defibrillator (ICD)                 Anesthesia Plan    ASA 3     general     intravenous induction     Anesthetic plan, risks, benefits, and alternatives have been provided, discussed and informed consent has been obtained with: patient.        CODE STATUS:

## 2022-09-19 ENCOUNTER — OFFICE VISIT (OUTPATIENT)
Dept: SURGERY | Facility: CLINIC | Age: 72
End: 2022-09-19

## 2022-09-19 VITALS — HEIGHT: 65 IN | BODY MASS INDEX: 33.39 KG/M2 | RESPIRATION RATE: 16 BRPM | WEIGHT: 200.4 LBS

## 2022-09-19 DIAGNOSIS — K40.20 NON-RECURRENT BILATERAL INGUINAL HERNIA WITHOUT OBSTRUCTION OR GANGRENE: Primary | ICD-10-CM

## 2022-09-19 PROCEDURE — 99024 POSTOP FOLLOW-UP VISIT: CPT | Performed by: SURGERY

## 2022-09-19 NOTE — PROGRESS NOTES
Chief Complaint  Follow-up    Subjective          Deisy Lama presents to [facility] GENER[facility] SUR[facility]ERY  History of Present Illness    Deisy Lama is a 72 y.o. female  who presents today for a postoperative visit.     Patient is here for a follow-up after a robotic bilateral inguinal hernia repair.  She is doing fine from that but is having a lot of back pain.  She had a CT scan of her lumbosacral spine and she does have some mild disc bulging as well as some moderate foraminal stenoses at multiple levels.    Past History:  Medical History: has a past medical history of AAA (abdominal aortic aneurysm) ([facility]), Acid reflux, Arthritis, Bladder disorder, Blood disease, Congestive heart failure (CHF) ([facility]), Cystourethrocele (12/19/2014), Diabetes mellitus ([facility]), Heart disease, Hiatal hernia, Hyperlipidemia LDL goal <100 (08/23/2021), Hypertension, Inguinal hernia, Kidney stone, Limb swelling, Presence of biventricular implantable cardioverter-defibrillator (ICD) (08/23/2021), Primary osteoarthritis of left knee (05/02/2018), Primary osteoarthritis of right knee (02/15/2018), Shortness of breath, and Thyroid disorder.   Surgical History: has a past surgical history that includes Appendectomy; Bladder surgery; Cardiac defibrillator placement; Cataract extraction (Bilateral); Cholecystectomy; Colonoscopy; Hernia repair; Hysterectomy; Kidney stone surgery; Pacemaker Implantation; Tonsillectomy; Heel spur surgery; Splenectomy, total; Gastroplasty; and Inguinal Hernia Repair (Bilateral, 8/31/2022).   Family History: family history includes Arthritis in her father and mother; Cancer in her father and son; Diabetes in her brother and mother; Heart disease in her father; Osteoporosis in her father and mother; Stroke in her mother.   Social History: reports that she has never smoked. She has never used smokeless tobacco. She reports that she does not drink alcohol and does not use drugs.  Allergies: Morphine,  Dilaudid [hydromorphone], and Lisinopril       Current Outpatient Medications:   •  Accu-Chek Evelina Plus test strip, 1 each by Other route Daily., Disp: , Rfl:   •  atorvastatin (LIPITOR) 10 MG tablet, Take 10 mg by mouth Every Night., Disp: , Rfl:   •  brimonidine (ALPHAGAN) 0.2 % ophthalmic solution, Administer 1 drop to both eyes 2 (Two) Times a Day., Disp: , Rfl:   •  diclofenac (VOLTAREN) 75 MG EC tablet, Take 75 mg by mouth 2 (Two) Times a Day. LAST DOSE 8/23/22, Disp: , Rfl:   •  dorzolamide (TRUSOPT) 2 % ophthalmic solution, , Disp: , Rfl:   •  furosemide (LASIX) 80 MG tablet, Take 1 tablet by mouth Daily. (Patient taking differently: Take 80 mg by mouth Every Other Day.), Disp: 90 tablet, Rfl: 3  •  glimepiride (AMARYL) 2 MG tablet, Take 2 mg by mouth Every Morning Before Breakfast. INST PER ANESTHESIA PROTOCOL, Disp: , Rfl:   •  hydroCHLOROthiazide (HYDRODIURIL) 25 MG tablet, Take 25 mg by mouth Daily., Disp: , Rfl:   •  HYDROcodone-acetaminophen (NORCO)  MG per tablet, Take 1 tablet by mouth Every 12 (Twelve) Hours As Needed., Disp: , Rfl:   •  levothyroxine (SYNTHROID, LEVOTHROID) 112 MCG tablet, Take 112 mcg by mouth Daily., Disp: , Rfl:   •  losartan (COZAAR) 100 MG tablet, Take 100 mg by mouth Daily. INST PER ANESTHESIA PROTOCOL, Disp: , Rfl:   •  metoprolol succinate XL (TOPROL-XL) 50 MG 24 hr tablet, Take 50 mg by mouth 2 (two) times a day. INST PER ANESTHESIA PROTOCOL, Disp: , Rfl:   •  Netarsudil-Latanoprost (Rocklatan) 0.02-0.005 % solution, Apply 1 drop to eye(s) as directed by provider Every Night., Disp: , Rfl:   •  Netarsudil-Latanoprost (Rocklatan) 0.02-0.005 % solution, Apply 1 drop to eye(s) as directed by provider Every Night., Disp: , Rfl:   •  omeprazole (priLOSEC) 40 MG capsule, omeprazole 40 mg capsule,delayed release, Disp: , Rfl:   •  rOPINIRole (REQUIP) 4 MG tablet, Take 4 mg by mouth Every Morning. Pt notes she takes one tablet in the morning and 3 tablets at bedtime., Disp:  ", Rfl:   •  rOPINIRole (REQUIP) 4 MG tablet, Take 12 mg by mouth Every Night., Disp: , Rfl:   •  sertraline (ZOLOFT) 100 MG tablet, Take 100 mg by mouth Daily., Disp: , Rfl:   •  spironolactone (ALDACTONE) 25 MG tablet, Take 1 tablet by mouth Daily., Disp: 90 tablet, Rfl: 3       Physical Exam  He appears well today her abdomen is soft her incisions look fine.  Objective     Vital Signs:   Resp 16   Ht 163.8 cm (64.5\")   Wt 90.9 kg (200 lb 6.4 oz)   BMI 33.87 kg/m²              Assessment and Plan    Diagnoses and all orders for this visit:    1. Non-recurrent bilateral inguinal hernia without obstruction or gangrene (Primary)    She seems to be doing fine from her hernia repair but was complaining a lot of her back pain.  We will go ahead for her over to neurosurgery and see if she has anything that requires surgery.      "

## 2022-09-28 ENCOUNTER — HOSPITAL ENCOUNTER (OUTPATIENT)
Dept: GENERAL RADIOLOGY | Facility: HOSPITAL | Age: 72
Discharge: HOME | End: 2022-09-28
Admitting: PHYSICIAN ASSISTANT

## 2022-09-28 ENCOUNTER — OFFICE VISIT (OUTPATIENT)
Dept: NEUROSURGERY | Facility: CLINIC | Age: 72
End: 2022-09-28

## 2022-09-28 VITALS
WEIGHT: 202 LBS | SYSTOLIC BLOOD PRESSURE: 130 MMHG | HEART RATE: 73 BPM | BODY MASS INDEX: 33.66 KG/M2 | DIASTOLIC BLOOD PRESSURE: 53 MMHG | HEIGHT: 65 IN

## 2022-09-28 DIAGNOSIS — M51.26 HERNIATED NUCLEUS PULPOSUS, L2-3 LEFT: Primary | ICD-10-CM

## 2022-09-28 DIAGNOSIS — M51.26 HERNIATED NUCLEUS PULPOSUS, L3-4 LEFT: ICD-10-CM

## 2022-09-28 DIAGNOSIS — M47.816 FACET ARTHROPATHY, LUMBAR: ICD-10-CM

## 2022-09-28 DIAGNOSIS — M43.17 SPONDYLOLISTHESIS AT L5-S1 LEVEL: ICD-10-CM

## 2022-09-28 PROCEDURE — 72114 X-RAY EXAM L-S SPINE BENDING: CPT

## 2022-09-28 PROCEDURE — 99214 OFFICE O/P EST MOD 30 MIN: CPT | Performed by: PHYSICIAN ASSISTANT

## 2022-09-28 NOTE — PROGRESS NOTES
"Chief Complaint  Leg Pain (Left leg to ankle ) and Back Pain    Subjective          Deisy Lama who is a 72 y.o. year old female who presents to [facility] for Evaluation of the Spine.     Last seen by Summer Valdes on 2/17/21 for low back pain. There was change of medication from Diclofenac to Meloxicam to assess benefit for her pain with PRN follow-up after that.    The patient complains of pain located in the Lumbar Spine.  Patients states the pain has been present for 6 years.  The pain came on gradually.  The pain scaled level is 10.  The pain does radiate. Dermatomes are located on left Lumbar at: sometimes to the knee, sometimes to the left ankle..  The pain is constant and waxing/waning and described as dull.  The pain is worse at no particular time of day. Patient states laying down and sitting makes the pain better.  Patient states being up on her feet makes the pain worse.    Associated Symptoms Include: Numbness intermittent down the left leg. She denies weakness or loss of bowel or bladder control.  Conservative Interventions Include: Epidural Steroids that were ineffective., Pain Medications that were ineffective. and NSAIDs that were ineffective. She also had an SCS trial which had to be aborted early, so she could not tell if it helped.    Was this the result of an injury or accident?: No    History of Previous Spinal Surgery?: No     reports that she has never smoked. She has never used smokeless tobacco.    Review of Systems   Musculoskeletal: Positive for back pain.        Objective   Vital Signs:   /53   Pulse 73   Ht 163.8 cm (64.5\")   Wt 91.6 kg (202 lb)   BMI 34.14 kg/m²       Physical Exam  Constitutional:       Appearance: Normal appearance. She is obese.   Pulmonary:      Effort: Pulmonary effort is normal.   Musculoskeletal:         General: No tenderness.      Comments: SLR on the left causes pain in the posterior left leg. "   Neurological:      General: No focal deficit present.      Mental Status: She is alert and oriented to person, place, and time.      Sensory: No sensory deficit.      Motor: No weakness.      Deep Tendon Reflexes: Reflexes normal.   Psychiatric:         Mood and Affect: Mood normal.         Behavior: Behavior normal.        Neurologic Exam     Mental Status   Oriented to person, place, and time.        Result Review     I have personally reviewed the CT of lumbar spine with intrathecal contrast from 5/23/2022 showing multilevel degenerative disc disease and facet arthritis.  At L2-L3 there is severe left foraminal stenosis, and at L3-L4 there is moderate left foraminal stenosis, otherwise there is no significant central canal or foraminal narrowing. There is mild anterolisthesis of L5 on S1.     Assessment and Plan    Diagnoses and all orders for this visit:    1. Herniated nucleus pulposus, L2-3 left (Primary)    2. Herniated nucleus pulposus, L3-4 left    3. Spondylolisthesis at L5-S1 level  -     XR Spine Lumbar Complete With Flex & Ext; Future    4. Facet arthropathy, lumbar    Her pain is in the back and down the left leg, sometimes to the knee, and other times past the knee to the ankle.    The most severe changes on the CT myelogram were severe stenosis on the left at L2-L3 and moderate on the left at L3-L4. I do not see that this really explains here left leg pain well.    With her non-specific pain, I do not expect surgery in the lumbar spine to be significantly likely to help her leg pain. I do not expect it to help back pain, either.    She does have some anterolisthesis at L5 on S1. I would consider lumbar flexion and extension views to assess stability of this. If this is unstable she may technically be a candidate for a fusion at this level, but again, with her non-specific pain I am not extremely confident that this would be helpful to the leg pain.    I am going to order the x-rays today.    She  will follow-up PRN, but I will notify her of x-ray results and discuss further follow-up if needed at that time.    Follow Up   Return if symptoms worsen or fail to improve.  Patient was given instructions and counseling regarding her condition or for health maintenance advice. Please see specific information pulled into the AVS if appropriate.

## 2022-09-29 ENCOUNTER — TELEPHONE (OUTPATIENT)
Dept: NEUROSURGERY | Facility: CLINIC | Age: 72
End: 2022-09-29

## 2022-09-29 NOTE — TELEPHONE ENCOUNTER
Patient has been notified of x-ray results. Deisy stated that she would like to make a follow-up appointment to discuss possibility of surgery.      F/U appointment made for 10/20/2022

## 2022-10-13 ENCOUNTER — OFFICE VISIT (OUTPATIENT)
Dept: ORTHOPEDIC SURGERY | Facility: CLINIC | Age: 72
End: 2022-10-13

## 2022-10-13 VITALS — WEIGHT: 202 LBS | BODY MASS INDEX: 34.49 KG/M2 | HEART RATE: 69 BPM | OXYGEN SATURATION: 99 % | HEIGHT: 64 IN

## 2022-10-13 DIAGNOSIS — M54.42 CHRONIC MIDLINE LOW BACK PAIN WITH BILATERAL SCIATICA: ICD-10-CM

## 2022-10-13 DIAGNOSIS — M16.12 PRIMARY OSTEOARTHRITIS OF LEFT HIP: ICD-10-CM

## 2022-10-13 DIAGNOSIS — M25.552 LEFT HIP PAIN: Primary | ICD-10-CM

## 2022-10-13 DIAGNOSIS — M54.41 CHRONIC MIDLINE LOW BACK PAIN WITH BILATERAL SCIATICA: ICD-10-CM

## 2022-10-13 DIAGNOSIS — G89.29 CHRONIC MIDLINE LOW BACK PAIN WITH BILATERAL SCIATICA: ICD-10-CM

## 2022-10-13 PROCEDURE — 99213 OFFICE O/P EST LOW 20 MIN: CPT | Performed by: ORTHOPAEDIC SURGERY

## 2022-10-13 NOTE — PROGRESS NOTES
"Chief Complaint  Pain and Follow-up of the Left Hip     Subjective      Deisy Lama presents to [facility] for a follow-up of left hip. Patient was last seen on 4/29/22. She has a history of left hip osteoarthritis. Patient does see pain management. She had an epidural injection that gave no relief. She has been having pain in the hip since 2021. She takes Diclofenac with minimal relief. Most of her pain is in the groin and laterally. Sometimes pain radiates down the thigh and into the knee. Sometimes pain radiates into the ankle. Patient reports hitting her shin on a door stopper that caused her pain. This does cause her to limp at times, resulting in worsening of hip pain. Patient does have a history of low back pain. She states pain alternates between her low back and hip.     Allergies   Allergen Reactions   • Morphine Itching   • Dilaudid [Hydromorphone] Confusion   • Lisinopril Cough        Social History     Socioeconomic History   • Marital status:    Tobacco Use   • Smoking status: Never   • Smokeless tobacco: Never   Vaping Use   • Vaping Use: Never used   Substance and Sexual Activity   • Alcohol use: Never   • Drug use: Never   • Sexual activity: Defer        Review of Systems     Objective   Vital Signs:   Pulse 69   Ht 162.6 cm (64\")   Wt 91.6 kg (202 lb)   SpO2 99%   BMI 34.67 kg/m²       Physical Exam  Constitutional:       Appearance: Normal appearance. Patient is well-developed and normal weight.   HENT:      Head: Normocephalic.      Right Ear: Hearing and external ear normal.      Left Ear: Hearing and external ear normal.      Nose: Nose normal.   Eyes:      Conjunctiva/sclera: Conjunctivae normal.   Cardiovascular:      Rate and Rhythm: Normal rate.   Pulmonary:      Effort: Pulmonary effort is normal.      Breath sounds: No wheezing or rales.   Abdominal:      Palpations: Abdomen is soft.      Tenderness: There is no abdominal tenderness. "   Musculoskeletal:      Cervical back: Normal range of motion.   Skin:     Findings: No rash.   Neurological:      Mental Status: Patient is alert and oriented to person, place, and time.   Psychiatric:         Mood and Affect: Mood and affect normal.         Judgment: Judgment normal.       Ortho Exam      LEFT HIP: Pain and limitation with hip flexion. Groin pain with hip motion. IR to 15 degrees. ER to 10 degrees. Limping and antalgic gait. Good tone of hip flexors, hip extensors, hip adductor, hip abductors. Sensation grossly intact. Neurovascular intact.  Positive hip impingement. Tender hip and pelvic muscles.     Procedures      Imaging Results (Most Recent)     Procedure Component Value Units Date/Time    XR Hip With or Without Pelvis 2 - 3 View Left [[id number]] Resulted: 10/13/22 1044     Updated: 10/13/22 1049           Result Review :     X-Ray Report:  Left hip(s) X-Ray  Indication: Evaluation of left hip pain   AP and Lateral view(s)  Findings: Joint space narrowing, this is consistent with left hip osteoarthritis. No acute fractures.   Prior studies available for comparison: no     Assessment and Plan     Diagnoses and all orders for this visit:    1. Left hip pain (Primary)  -     XR Hip With or Without Pelvis 2 - 3 View Left    2. Primary osteoarthritis of left hip    3. Chronic midline low back pain with bilateral sciatica        Patient has moderate osteoarthritis and severe degeneration of the low back. She does have groin pain but she also has pain radiating down the leg. Radiation pain correlates with her back. She would not get relief from hip replacement at this time.      Referred to [facility].     Call or return if worsening symptoms.    Follow Up     Prn.       Patient was given instructions and counseling regarding her condition or for health maintenance advice. Please see specific information pulled into the AVS if appropriate.     Scribed for Ewa Rodriguez MD by Sushma  Baldev.  10/13/22   10:53 EDT    I have personally performed the services described in this document as scribed by the above individual and it is both accurate and complete. Ewa Rodriguez MD 10/13/22

## 2022-10-20 ENCOUNTER — OFFICE VISIT (OUTPATIENT)
Dept: NEUROSURGERY | Facility: CLINIC | Age: 72
End: 2022-10-20

## 2022-10-20 VITALS
SYSTOLIC BLOOD PRESSURE: 143 MMHG | BODY MASS INDEX: 33.63 KG/M2 | HEART RATE: 72 BPM | WEIGHT: 197 LBS | HEIGHT: 64 IN | DIASTOLIC BLOOD PRESSURE: 64 MMHG

## 2022-10-20 DIAGNOSIS — M54.42 CHRONIC MIDLINE LOW BACK PAIN WITH LEFT-SIDED SCIATICA: ICD-10-CM

## 2022-10-20 DIAGNOSIS — M51.26 HERNIATED NUCLEUS PULPOSUS, L2-3 LEFT: Primary | ICD-10-CM

## 2022-10-20 DIAGNOSIS — G89.29 CHRONIC MIDLINE LOW BACK PAIN WITH LEFT-SIDED SCIATICA: ICD-10-CM

## 2022-10-20 DIAGNOSIS — M51.26 HERNIATED NUCLEUS PULPOSUS, L3-4 LEFT: ICD-10-CM

## 2022-10-20 PROCEDURE — 99214 OFFICE O/P EST MOD 30 MIN: CPT | Performed by: PHYSICIAN ASSISTANT

## 2022-10-20 NOTE — PROGRESS NOTES
"Patient being seen for today for Follow-up and Leg Pain (Left /groin to knee)  .    Subjective    Deisy Lama is a 72 y.o. female that presents with Follow-up and Leg Pain (Left /groin to knee)  .    HPI  Previously: Last seen on 9/28/2022 for pain in the back going down the left leg sometimes to the knee and sometimes as far down as the ankle.  She had a CT myelogram showing severe foraminal stenosis at L2-L3 on the left and moderate on the left at L3-L4.  She did have anterolisthesis of L5 on S1 and there was an order for x-ray of the lumbar spine with flexion-extension views to assess stability.    Today: She continues to have pain to the left knee. She did complete the flexion and extension x-ray.    She reports her pain in general is worse, starting as soon as she is out of bed in the morning.    Sitting and laying down helps the pain. Standing and walking makes her pain worse.    She is taking Norco for pain and this helps. She got these from pain management. She also had a spinal cord stimulator trial for \"a day and a half\", but she had to discontinue the trial because she had complications with the leads becoming displaced.     reports that she has never smoked. She has never used smokeless tobacco.    Review of Systems   Musculoskeletal: Positive for back pain.       Objective   Vitals:    10/20/22 1429   BP: 143/64   Pulse: 72        Physical Exam  Constitutional:       Appearance: Normal appearance. She is obese.   Pulmonary:      Effort: Pulmonary effort is normal.   Musculoskeletal:         General: No tenderness.      Comments: SLR negative bilaterally   Neurological:      General: No focal deficit present.      Mental Status: She is alert and oriented to person, place, and time.      Sensory: No sensory deficit.      Motor: No weakness.      Deep Tendon Reflexes: Reflexes normal.   Psychiatric:         Mood and Affect: Mood normal.         Behavior: Behavior normal.          Result Review   I have " personally reviewed the x-ray of lumbar spine with flexion-extension views from 9/20/2022 which shows no instability in the lumbar spine.     Assessment and Plan {CC Problem List  Visit Diagnosis  ROS  Review (Popup)  [facility]  Quality  BestPractice  Medications  SmartSets  SnapShot Encounters  Media :23}   Diagnoses and all orders for this visit:    1. Herniated nucleus pulposus, L2-3 left (Primary)    2. Herniated nucleus pulposus, L3-4 left    3. Chronic midline low back pain with left-sided sciatica    Her pain is in the left leg to the knee for the most part.    She does have some left foraminal stenosis at L2-L3 and to a lesser degree at L3-L4.    She did discuss the possibility of a surgical approach with Dr. Rodriguez today, including the risks, benefits and alternatives. She is going to pursue a full trial of the spinal cord stimulator - if this does not help then Dr. Rodriguez would consider a left foraminotomy and lateral recess decompression at L2-L3, then possibly having an Orthopedist address the left hip if spinal surgery does not help.    She will discuss the SCS trial with pain management.    She will follow-up here PRN.  Follow Up {Instructions Charge Capture  Follow-up Communications :23}   Return if symptoms worsen or fail to improve.

## 2022-11-22 ENCOUNTER — LAB (OUTPATIENT)
Dept: LAB | Facility: HOSPITAL | Age: 72
End: 2022-11-22

## 2022-11-22 ENCOUNTER — TRANSCRIBE ORDERS (OUTPATIENT)
Dept: LAB | Facility: HOSPITAL | Age: 72
End: 2022-11-22

## 2022-11-22 DIAGNOSIS — H40.1212 LOW-TENSION GLAUCOMA OF RIGHT EYE, MODERATE STAGE: ICD-10-CM

## 2022-11-22 DIAGNOSIS — H40.1223 LOW-TENSION GLAUCOMA OF LEFT EYE, SEVERE STAGE: ICD-10-CM

## 2022-11-22 DIAGNOSIS — H49.21 ABDUCENT NERVE PALSY, RIGHT EYE: Primary | ICD-10-CM

## 2022-11-22 DIAGNOSIS — H49.21 ABDUCENT NERVE PALSY, RIGHT EYE: ICD-10-CM

## 2022-11-22 LAB
ALBUMIN SERPL-MCNC: 4 G/DL (ref 3.5–5.2)
ALBUMIN/GLOB SERPL: 1.5 G/DL
ALP SERPL-CCNC: 89 U/L (ref 39–117)
ALT SERPL W P-5'-P-CCNC: 17 U/L (ref 1–33)
ANION GAP SERPL CALCULATED.3IONS-SCNC: 11.1 MMOL/L (ref 5–15)
AST SERPL-CCNC: 16 U/L (ref 1–32)
BILIRUB SERPL-MCNC: 0.5 MG/DL (ref 0–1.2)
BUN SERPL-MCNC: 31 MG/DL (ref 8–23)
BUN/CREAT SERPL: 18.6 (ref 7–25)
CALCIUM SPEC-SCNC: 9.9 MG/DL (ref 8.6–10.5)
CHLORIDE SERPL-SCNC: 108 MMOL/L (ref 98–107)
CO2 SERPL-SCNC: 24.9 MMOL/L (ref 22–29)
CREAT SERPL-MCNC: 1.67 MG/DL (ref 0.57–1)
CRP SERPL-MCNC: <0.3 MG/DL (ref 0–0.5)
EGFRCR SERPLBLD CKD-EPI 2021: 32 ML/MIN/1.73
ERYTHROCYTE [SEDIMENTATION RATE] IN BLOOD: 2 MM/HR (ref 0–30)
GLOBULIN UR ELPH-MCNC: 2.7 GM/DL
GLUCOSE SERPL-MCNC: 64 MG/DL (ref 65–99)
POTASSIUM SERPL-SCNC: 5.2 MMOL/L (ref 3.5–5.2)
PROT SERPL-MCNC: 6.7 G/DL (ref 6–8.5)
SODIUM SERPL-SCNC: 144 MMOL/L (ref 136–145)

## 2022-11-22 PROCEDURE — 86140 C-REACTIVE PROTEIN: CPT

## 2022-11-22 PROCEDURE — 85652 RBC SED RATE AUTOMATED: CPT

## 2022-11-22 PROCEDURE — 80053 COMPREHEN METABOLIC PANEL: CPT

## 2022-11-22 PROCEDURE — 36415 COLL VENOUS BLD VENIPUNCTURE: CPT

## 2023-01-03 ENCOUNTER — PREP FOR SURGERY (OUTPATIENT)
Dept: OTHER | Facility: HOSPITAL | Age: 73
End: 2023-01-03
Payer: MEDICARE

## 2023-01-03 ENCOUNTER — TELEPHONE (OUTPATIENT)
Dept: NEUROSURGERY | Facility: CLINIC | Age: 73
End: 2023-01-03
Payer: MEDICARE

## 2023-01-03 DIAGNOSIS — M54.42 CHRONIC MIDLINE LOW BACK PAIN WITH LEFT-SIDED SCIATICA: Primary | ICD-10-CM

## 2023-01-03 DIAGNOSIS — G89.29 CHRONIC MIDLINE LOW BACK PAIN WITH LEFT-SIDED SCIATICA: Primary | ICD-10-CM

## 2023-01-03 RX ORDER — CEFAZOLIN SODIUM 2 G/100ML
2 INJECTION, SOLUTION INTRAVENOUS ONCE
Status: CANCELLED | OUTPATIENT
Start: 2023-01-03 | End: 2023-01-03

## 2023-01-03 NOTE — TELEPHONE ENCOUNTER
Caller: BILL ARZATE    Relationship: SELF    Best call back number: [phone]    What was the call regarding: PT CALLED AND STATES THAT SHE WAS IN AND SEEN WILLIE ELIZALDE AND DR. PAN CAME IN AND SPOKE WITH HER AND STATES THAT HE WOULD DO SURGERY ON HER LEFT LEG.  PT STATES SHE IS IN MORE PAIN AND CAN HARDLY WALK DUE TO THE PAIN AND IS READY TO SCHEDULE SX.    Do you require a callback:     PLEASE CALL PT  THANK YOU

## 2023-01-04 PROBLEM — G89.29 CHRONIC MIDLINE LOW BACK PAIN WITH LEFT-SIDED SCIATICA: Status: ACTIVE | Noted: 2023-01-04

## 2023-01-04 PROBLEM — M54.42 CHRONIC MIDLINE LOW BACK PAIN WITH LEFT-SIDED SCIATICA: Status: ACTIVE | Noted: 2023-01-04

## 2023-01-27 ENCOUNTER — APPOINTMENT (OUTPATIENT)
Dept: GENERAL RADIOLOGY | Facility: HOSPITAL | Age: 73
End: 2023-01-27
Payer: MEDICARE

## 2023-01-27 ENCOUNTER — ANESTHESIA (OUTPATIENT)
Dept: PERIOP | Facility: HOSPITAL | Age: 73
End: 2023-01-27
Payer: MEDICARE

## 2023-01-27 ENCOUNTER — HOSPITAL ENCOUNTER (OUTPATIENT)
Facility: HOSPITAL | Age: 73
Setting detail: HOSPITAL OUTPATIENT SURGERY
Discharge: HOME | End: 2023-01-27
Attending: NEUROLOGICAL SURGERY | Admitting: NEUROLOGICAL SURGERY
Payer: MEDICARE

## 2023-01-27 ENCOUNTER — ANESTHESIA EVENT (OUTPATIENT)
Dept: PERIOP | Facility: HOSPITAL | Age: 73
End: 2023-01-27
Payer: MEDICARE

## 2023-01-27 VITALS
HEART RATE: 64 BPM | WEIGHT: 182.98 LBS | OXYGEN SATURATION: 92 % | HEIGHT: 62 IN | SYSTOLIC BLOOD PRESSURE: 133 MMHG | BODY MASS INDEX: 33.67 KG/M2 | DIASTOLIC BLOOD PRESSURE: 65 MMHG | RESPIRATION RATE: 16 BRPM | TEMPERATURE: 98.8 F

## 2023-01-27 DIAGNOSIS — M54.42 CHRONIC MIDLINE LOW BACK PAIN WITH LEFT-SIDED SCIATICA: ICD-10-CM

## 2023-01-27 DIAGNOSIS — G89.29 CHRONIC MIDLINE LOW BACK PAIN WITH LEFT-SIDED SCIATICA: ICD-10-CM

## 2023-01-27 LAB
ANION GAP SERPL CALCULATED.3IONS-SCNC: 11.2 MMOL/L (ref 5–15)
BUN SERPL-MCNC: 41 MG/DL (ref 8–23)
BUN/CREAT SERPL: 24 (ref 7–25)
CALCIUM SPEC-SCNC: 9.8 MG/DL (ref 8.6–10.5)
CHLORIDE SERPL-SCNC: 108 MMOL/L (ref 98–107)
CO2 SERPL-SCNC: 23.8 MMOL/L (ref 22–29)
CREAT SERPL-MCNC: 1.71 MG/DL (ref 0.57–1)
EGFRCR SERPLBLD CKD-EPI 2021: 31.1 ML/MIN/1.73
GLUCOSE BLDC GLUCOMTR-MCNC: 73 MG/DL (ref 70–99)
GLUCOSE SERPL-MCNC: 84 MG/DL (ref 65–99)
POTASSIUM SERPL-SCNC: 4.2 MMOL/L (ref 3.5–5.2)
QT INTERVAL: 467 MS
SODIUM SERPL-SCNC: 143 MMOL/L (ref 136–145)

## 2023-01-27 PROCEDURE — S0260 H&P FOR SURGERY: HCPCS | Performed by: NEUROLOGICAL SURGERY

## 2023-01-27 PROCEDURE — 25010000002 FENTANYL CITRATE (PF) 50 MCG/ML SOLUTION: Performed by: NURSE ANESTHETIST, CERTIFIED REGISTERED

## 2023-01-27 PROCEDURE — 76000 FLUOROSCOPY <1 HR PHYS/QHP: CPT

## 2023-01-27 PROCEDURE — 93010 ELECTROCARDIOGRAM REPORT: CPT | Performed by: INTERNAL MEDICINE

## 2023-01-27 PROCEDURE — 25010000002 ONDANSETRON PER 1 MG: Performed by: NURSE ANESTHETIST, CERTIFIED REGISTERED

## 2023-01-27 PROCEDURE — 80048 BASIC METABOLIC PNL TOTAL CA: CPT | Performed by: NEUROLOGICAL SURGERY

## 2023-01-27 PROCEDURE — 25010000002 MIDAZOLAM PER 1 MG: Performed by: ANESTHESIOLOGY

## 2023-01-27 PROCEDURE — 25010000002 PROPOFOL 10 MG/ML EMULSION: Performed by: NURSE ANESTHETIST, CERTIFIED REGISTERED

## 2023-01-27 PROCEDURE — 82962 GLUCOSE BLOOD TEST: CPT

## 2023-01-27 PROCEDURE — 25010000002 CEFAZOLIN IN DEXTROSE 2-4 GM/100ML-% SOLUTION: Performed by: NEUROLOGICAL SURGERY

## 2023-01-27 PROCEDURE — 25010000002 DEXAMETHASONE PER 1 MG: Performed by: NURSE ANESTHETIST, CERTIFIED REGISTERED

## 2023-01-27 PROCEDURE — 93005 ELECTROCARDIOGRAM TRACING: CPT | Performed by: NEUROLOGICAL SURGERY

## 2023-01-27 PROCEDURE — 63047 LAM FACETEC & FORAMOT LUMBAR: CPT | Performed by: NEUROLOGICAL SURGERY

## 2023-01-27 RX ORDER — PHENYLEPHRINE HCL IN 0.9% NACL 1 MG/10 ML
SYRINGE (ML) INTRAVENOUS AS NEEDED
Status: DISCONTINUED | OUTPATIENT
Start: 2023-01-27 | End: 2023-01-27 | Stop reason: SURG

## 2023-01-27 RX ORDER — ONDANSETRON 2 MG/ML
4 INJECTION INTRAMUSCULAR; INTRAVENOUS ONCE AS NEEDED
Status: DISCONTINUED | OUTPATIENT
Start: 2023-01-27 | End: 2023-01-27 | Stop reason: HOSPADM

## 2023-01-27 RX ORDER — CEFAZOLIN SODIUM 2 G/100ML
2 INJECTION, SOLUTION INTRAVENOUS ONCE
Status: COMPLETED | OUTPATIENT
Start: 2023-01-27 | End: 2023-01-27

## 2023-01-27 RX ORDER — KETAMINE HCL IN NACL, ISO-OSM 100MG/10ML
SYRINGE (ML) INJECTION AS NEEDED
Status: DISCONTINUED | OUTPATIENT
Start: 2023-01-27 | End: 2023-01-27 | Stop reason: SURG

## 2023-01-27 RX ORDER — SODIUM CHLORIDE, SODIUM LACTATE, POTASSIUM CHLORIDE, CALCIUM CHLORIDE 600; 310; 30; 20 MG/100ML; MG/100ML; MG/100ML; MG/100ML
9 INJECTION, SOLUTION INTRAVENOUS CONTINUOUS PRN
Status: DISCONTINUED | OUTPATIENT
Start: 2023-01-27 | End: 2023-01-27 | Stop reason: HOSPADM

## 2023-01-27 RX ORDER — LIDOCAINE HYDROCHLORIDE 20 MG/ML
INJECTION, SOLUTION EPIDURAL; INFILTRATION; INTRACAUDAL; PERINEURAL AS NEEDED
Status: DISCONTINUED | OUTPATIENT
Start: 2023-01-27 | End: 2023-01-27 | Stop reason: SURG

## 2023-01-27 RX ORDER — OXYCODONE HYDROCHLORIDE 5 MG/1
5 TABLET ORAL
Status: DISCONTINUED | OUTPATIENT
Start: 2023-01-27 | End: 2023-01-27 | Stop reason: HOSPADM

## 2023-01-27 RX ORDER — MAGNESIUM HYDROXIDE 1200 MG/15ML
LIQUID ORAL AS NEEDED
Status: DISCONTINUED | OUTPATIENT
Start: 2023-01-27 | End: 2023-01-27 | Stop reason: HOSPADM

## 2023-01-27 RX ORDER — DEXAMETHASONE SODIUM PHOSPHATE 4 MG/ML
INJECTION, SOLUTION INTRA-ARTICULAR; INTRALESIONAL; INTRAMUSCULAR; INTRAVENOUS; SOFT TISSUE AS NEEDED
Status: DISCONTINUED | OUTPATIENT
Start: 2023-01-27 | End: 2023-01-27

## 2023-01-27 RX ORDER — ONDANSETRON 2 MG/ML
INJECTION INTRAMUSCULAR; INTRAVENOUS AS NEEDED
Status: DISCONTINUED | OUTPATIENT
Start: 2023-01-27 | End: 2023-01-27 | Stop reason: SURG

## 2023-01-27 RX ORDER — PROMETHAZINE HYDROCHLORIDE 25 MG/1
25 SUPPOSITORY RECTAL ONCE AS NEEDED
Status: DISCONTINUED | OUTPATIENT
Start: 2023-01-27 | End: 2023-01-27 | Stop reason: HOSPADM

## 2023-01-27 RX ORDER — FENTANYL CITRATE 50 UG/ML
INJECTION, SOLUTION INTRAMUSCULAR; INTRAVENOUS AS NEEDED
Status: DISCONTINUED | OUTPATIENT
Start: 2023-01-27 | End: 2023-01-27 | Stop reason: SURG

## 2023-01-27 RX ORDER — MIDAZOLAM HYDROCHLORIDE 1 MG/ML
2 INJECTION INTRAMUSCULAR; INTRAVENOUS ONCE
Status: COMPLETED | OUTPATIENT
Start: 2023-01-27 | End: 2023-01-27

## 2023-01-27 RX ORDER — PROPOFOL 10 MG/ML
VIAL (ML) INTRAVENOUS AS NEEDED
Status: DISCONTINUED | OUTPATIENT
Start: 2023-01-27 | End: 2023-01-27 | Stop reason: SURG

## 2023-01-27 RX ORDER — ROCURONIUM BROMIDE 10 MG/ML
INJECTION, SOLUTION INTRAVENOUS AS NEEDED
Status: DISCONTINUED | OUTPATIENT
Start: 2023-01-27 | End: 2023-01-27 | Stop reason: SURG

## 2023-01-27 RX ORDER — DEXAMETHASONE SODIUM PHOSPHATE 4 MG/ML
INJECTION, SOLUTION INTRA-ARTICULAR; INTRALESIONAL; INTRAMUSCULAR; INTRAVENOUS; SOFT TISSUE AS NEEDED
Status: DISCONTINUED | OUTPATIENT
Start: 2023-01-27 | End: 2023-01-27 | Stop reason: SURG

## 2023-01-27 RX ORDER — ACETAMINOPHEN 500 MG
1000 TABLET ORAL ONCE
Status: COMPLETED | OUTPATIENT
Start: 2023-01-27 | End: 2023-01-27

## 2023-01-27 RX ORDER — PROMETHAZINE HYDROCHLORIDE 12.5 MG/1
25 TABLET ORAL ONCE AS NEEDED
Status: DISCONTINUED | OUTPATIENT
Start: 2023-01-27 | End: 2023-01-27 | Stop reason: HOSPADM

## 2023-01-27 RX ORDER — BUPIVACAINE HYDROCHLORIDE AND EPINEPHRINE 5; 5 MG/ML; UG/ML
INJECTION, SOLUTION EPIDURAL; INTRACAUDAL; PERINEURAL AS NEEDED
Status: DISCONTINUED | OUTPATIENT
Start: 2023-01-27 | End: 2023-01-27 | Stop reason: HOSPADM

## 2023-01-27 RX ORDER — OXYCODONE HYDROCHLORIDE AND ACETAMINOPHEN 5; 325 MG/1; MG/1
1 TABLET ORAL EVERY 4 HOURS PRN
Qty: 25 TABLET | Refills: 0 | Status: SHIPPED | OUTPATIENT
Start: 2023-01-27 | End: 2023-02-16

## 2023-01-27 RX ADMIN — Medication 50 MG: at 09:45

## 2023-01-27 RX ADMIN — SODIUM CHLORIDE, POTASSIUM CHLORIDE, SODIUM LACTATE AND CALCIUM CHLORIDE 9 ML/HR: 600; 310; 30; 20 INJECTION, SOLUTION INTRAVENOUS at 11:43

## 2023-01-27 RX ADMIN — FENTANYL CITRATE 50 MCG: 50 INJECTION, SOLUTION INTRAMUSCULAR; INTRAVENOUS at 09:31

## 2023-01-27 RX ADMIN — DEXAMETHASONE SODIUM PHOSPHATE 4 MG: 4 INJECTION, SOLUTION INTRA-ARTICULAR; INTRALESIONAL; INTRAMUSCULAR; INTRAVENOUS; SOFT TISSUE at 09:50

## 2023-01-27 RX ADMIN — ONDANSETRON 4 MG: 2 INJECTION INTRAMUSCULAR; INTRAVENOUS at 09:50

## 2023-01-27 RX ADMIN — Medication 100 MCG: at 09:51

## 2023-01-27 RX ADMIN — ROCURONIUM BROMIDE 50 MG: 10 INJECTION, SOLUTION INTRAVENOUS at 09:31

## 2023-01-27 RX ADMIN — CEFAZOLIN SODIUM 2 G: 2 INJECTION, SOLUTION INTRAVENOUS at 09:34

## 2023-01-27 RX ADMIN — OXYCODONE 5 MG: 5 TABLET ORAL at 12:16

## 2023-01-27 RX ADMIN — ACETAMINOPHEN 1000 MG: 500 TABLET ORAL at 08:32

## 2023-01-27 RX ADMIN — Medication 200 MCG: at 09:58

## 2023-01-27 RX ADMIN — SUGAMMADEX 200 MG: 100 INJECTION, SOLUTION INTRAVENOUS at 10:34

## 2023-01-27 RX ADMIN — MIDAZOLAM HYDROCHLORIDE 2 MG: 2 INJECTION, SOLUTION INTRAMUSCULAR; INTRAVENOUS at 09:18

## 2023-01-27 RX ADMIN — Medication 100 MCG: at 09:47

## 2023-01-27 RX ADMIN — PROPOFOL 150 MG: 10 INJECTION, EMULSION INTRAVENOUS at 09:31

## 2023-01-27 RX ADMIN — FENTANYL CITRATE 50 MCG: 50 INJECTION, SOLUTION INTRAMUSCULAR; INTRAVENOUS at 09:45

## 2023-01-27 RX ADMIN — SODIUM CHLORIDE, POTASSIUM CHLORIDE, SODIUM LACTATE AND CALCIUM CHLORIDE 9 ML/HR: 600; 310; 30; 20 INJECTION, SOLUTION INTRAVENOUS at 08:33

## 2023-01-27 RX ADMIN — Medication 200 MCG: at 10:09

## 2023-01-27 RX ADMIN — Medication 200 MCG: at 10:21

## 2023-01-27 RX ADMIN — LIDOCAINE HYDROCHLORIDE 100 MG: 20 INJECTION, SOLUTION EPIDURAL; INFILTRATION; INTRACAUDAL; PERINEURAL at 09:31

## 2023-01-27 NOTE — ANESTHESIA PREPROCEDURE EVALUATION
Anesthesia Evaluation     Patient summary reviewed and Nursing notes reviewed   no history of anesthetic complications:  NPO Solid Status: > 8 hours  NPO Liquid Status: > 2 hours           Airway   Mallampati: I  TM distance: >3 FB  Neck ROM: full  No difficulty expected  Dental      Pulmonary - normal exam    breath sounds clear to auscultation  (+) shortness of breath,   Cardiovascular - normal exam  Exercise tolerance: poor (<4 METS)    ECG reviewed  Rhythm: regular  Rate: normal    (+) pacemaker pacemaker, hypertension, dysrhythmias Atrial Fib, CHF , hyperlipidemia,       Neuro/Psych  (+) numbness,    GI/Hepatic/Renal/Endo    (+)  hiatal hernia, GERD,  renal disease, diabetes mellitus type 2, thyroid problem hypothyroidism    Musculoskeletal     Abdominal    Substance History - negative use     OB/GYN negative ob/gyn ROS         Other   arthritis, blood dyscrasia,     ROS/Med Hx Other: <4METS, HX SOAE, DECREASED MOBILITY, HTN, HFrEF, DM,SPLENECTOMY, PACEMAKER. DEVICE CHECK 1/11/23 WNL. ECHO 8/26/21 EF 64%, NO VALVE STENOSIS. STRESS 9/19 NO ISCHEMIA. CARDS OV 6/2/22, EF NORMALIZED, HTN CONTROLLEDF/U 6 MTHS.,HX CHRONIC ELEV. WBC PLT   KT        ABNORMAL ECG -  Atrial-sensed ventricular-paced rhythm  Biventricular paced rhythm  No further analysis attempted due to paced rhythm    Interpretation Summary    • Calculated left ventricular EF = 64% Estimated left ventricular EF was in agreement with the calculated left ventricular EF.  • Left ventricular diastolic function is consistent with (grade Ia w/high LAP) impaired relaxation.    ekg rate 67 atrial senseed v paced rhythmn biventricular paced rhythmn       Anesthesia Plan    ASA 4     general     (Patient understands anesthesia not responsible for dental damage.)  intravenous induction     Anesthetic plan, risks, benefits, and alternatives have been provided, discussed and informed consent has been obtained with: patient.    Use of blood products discussed with  patient .   Plan discussed with CRNA.        CODE STATUS:

## 2023-01-27 NOTE — ANESTHESIA POSTPROCEDURE EVALUATION
Patient: Deisy Lama    Procedure Summary     Date: 01/27/23 Room / Location: [facility] OR 05 / [facility] MAIN OR    Anesthesia Start: 0926 Anesthesia Stop: 1057    Procedure: MINIMALLY INVASIVE LUMBAR LATERAL RECESS DECOMPRESSION AND FORAMINOTOMY, LEFT APPROACH, LUMBAR 2-LUMBAR 3 (Left: Spine Lumbar) Diagnosis:       Chronic midline low back pain with left-sided sciatica      (Chronic midline low back pain with left-sided sciatica [M54.42, G89.29])    Surgeons: Ralf Rodriguez MD Provider: Kash Castillo MD    Anesthesia Type: general ASA Status: 4          Anesthesia Type: general    Vitals  Vitals Value Taken Time   /58 01/27/23 1145   Temp 36.6 °C (97.8 °F) 01/27/23 1055   Pulse 65 01/27/23 1148   Resp 18 01/27/23 1145   SpO2 97 % 01/27/23 1148   Vitals shown include unvalidated device data.        Post Anesthesia Care and Evaluation    Patient location during evaluation: bedside  Patient participation: complete - patient participated  Level of consciousness: awake  Pain management: adequate    Airway patency: patent  Anesthetic complications: No anesthetic complications  PONV Status: none  Cardiovascular status: acceptable and stable  Respiratory status: acceptable  Hydration status: acceptable    Comments: An Anesthesiologist personally participated in the most demanding procedures (including induction and emergence if applicable) in the anesthesia plan, monitored the course of anesthesia administration at frequent intervals and remained physically present and available for immediate diagnosis and treatment of emergencies.

## 2023-01-29 ENCOUNTER — HOSPITAL ENCOUNTER (EMERGENCY)
Facility: HOSPITAL | Age: 73
Discharge: HOME | End: 2023-01-29
Attending: EMERGENCY MEDICINE | Admitting: EMERGENCY MEDICINE
Payer: MEDICARE

## 2023-01-29 VITALS
DIASTOLIC BLOOD PRESSURE: 84 MMHG | WEIGHT: 191.14 LBS | HEIGHT: 63 IN | HEART RATE: 73 BPM | OXYGEN SATURATION: 96 % | BODY MASS INDEX: 33.87 KG/M2 | TEMPERATURE: 98.3 F | SYSTOLIC BLOOD PRESSURE: 169 MMHG | RESPIRATION RATE: 16 BRPM

## 2023-01-29 DIAGNOSIS — G97.1 SPINAL HEADACHE: Primary | ICD-10-CM

## 2023-01-29 PROCEDURE — 25010000002 PROCHLORPERAZINE 10 MG/2ML SOLUTION: Performed by: EMERGENCY MEDICINE

## 2023-01-29 PROCEDURE — 25010000002 KETOROLAC TROMETHAMINE PER 15 MG: Performed by: EMERGENCY MEDICINE

## 2023-01-29 PROCEDURE — 99283 EMERGENCY DEPT VISIT LOW MDM: CPT

## 2023-01-29 PROCEDURE — 96375 TX/PRO/DX INJ NEW DRUG ADDON: CPT

## 2023-01-29 PROCEDURE — 25010000002 DIPHENHYDRAMINE PER 50 MG: Performed by: EMERGENCY MEDICINE

## 2023-01-29 PROCEDURE — 96374 THER/PROPH/DIAG INJ IV PUSH: CPT

## 2023-01-29 RX ORDER — DIPHENHYDRAMINE HYDROCHLORIDE 50 MG/ML
25 INJECTION INTRAMUSCULAR; INTRAVENOUS ONCE
Status: COMPLETED | OUTPATIENT
Start: 2023-01-29 | End: 2023-01-29

## 2023-01-29 RX ORDER — KETOROLAC TROMETHAMINE 15 MG/ML
15 INJECTION, SOLUTION INTRAMUSCULAR; INTRAVENOUS ONCE
Status: COMPLETED | OUTPATIENT
Start: 2023-01-29 | End: 2023-01-29

## 2023-01-29 RX ORDER — PROCHLORPERAZINE EDISYLATE 5 MG/ML
10 INJECTION INTRAMUSCULAR; INTRAVENOUS ONCE
Status: COMPLETED | OUTPATIENT
Start: 2023-01-29 | End: 2023-01-29

## 2023-01-29 RX ADMIN — KETOROLAC TROMETHAMINE 15 MG: 15 INJECTION, SOLUTION INTRAMUSCULAR; INTRAVENOUS at 17:08

## 2023-01-29 RX ADMIN — PROCHLORPERAZINE EDISYLATE 10 MG: 5 INJECTION INTRAMUSCULAR; INTRAVENOUS at 17:07

## 2023-01-29 RX ADMIN — SODIUM CHLORIDE 1000 ML: 9 INJECTION, SOLUTION INTRAVENOUS at 17:06

## 2023-01-29 RX ADMIN — DIPHENHYDRAMINE HYDROCHLORIDE 25 MG: 50 INJECTION, SOLUTION INTRAMUSCULAR; INTRAVENOUS at 17:08

## 2023-02-01 ENCOUNTER — TELEPHONE (OUTPATIENT)
Dept: NEUROSURGERY | Facility: CLINIC | Age: 73
End: 2023-02-01
Payer: MEDICARE

## 2023-02-01 NOTE — TELEPHONE ENCOUNTER
"Patient had lumbar lateral recess decompression on Friday, 1-27-23. Called today with complaints of her left leg and foot \"jerking\" since Saturday. She states she takes ropinirole 4mg, but it is not taking care of the symptom like prior to surgery.   "

## 2023-02-02 NOTE — TELEPHONE ENCOUNTER
Spoke with patient. She states it is not worsening but it is not improving at all. No weakness. Per Dr. Rodriguez, can schedule patient to come in sooner. Scheduled appointment for 2-7-23 to discuss.

## 2023-02-06 ENCOUNTER — TELEPHONE (OUTPATIENT)
Dept: NEUROLOGY | Facility: CLINIC | Age: 73
End: 2023-02-06
Payer: MEDICARE

## 2023-02-06 NOTE — TELEPHONE ENCOUNTER
PATIENT CALLED STATING SHE'S HAD A HEADACHE FOR THREE DAYS EARS ARE HURTING SHE'S WONDERING IF SHE HAS A SPINAL LEAK. PLEASE CALL [phone]. PATIENT HAS AN APPOINTMENT TOMORROW 2/7/23

## 2023-02-06 NOTE — TELEPHONE ENCOUNTER
PT CALLED BACK [facility] TO CANCEL APPT 2-7-23 BECAUSE SHE DOES NOT FEEL LIKE COMING IN SINCE NO ONE CALLED HER BACK ABOUT THE HEADACHES SHE HAS BEEN HAVING.

## 2023-02-07 NOTE — TELEPHONE ENCOUNTER
"Patient called back yesterday afternoon to cancel appointment scheduled for today 2-7-23. Spoke with patient on the phone this morning and she states her headache has improved and \"she feels like she just has a cold\". Confirmed follow up appointment on 2-16-23.  "

## 2023-02-16 ENCOUNTER — OFFICE VISIT (OUTPATIENT)
Dept: NEUROSURGERY | Facility: CLINIC | Age: 73
End: 2023-02-16
Payer: MEDICARE

## 2023-02-16 VITALS
SYSTOLIC BLOOD PRESSURE: 153 MMHG | WEIGHT: 187 LBS | HEART RATE: 66 BPM | DIASTOLIC BLOOD PRESSURE: 70 MMHG | HEIGHT: 63 IN | BODY MASS INDEX: 33.13 KG/M2

## 2023-02-16 DIAGNOSIS — M51.26 HERNIATED NUCLEUS PULPOSUS, L2-3 LEFT: Primary | ICD-10-CM

## 2023-02-16 DIAGNOSIS — Z98.890 STATUS POST LUMBAR SURGERY: ICD-10-CM

## 2023-02-16 PROBLEM — E07.9 THYROID DISORDER: Status: ACTIVE | Noted: 2023-02-16

## 2023-02-16 PROBLEM — M54.30 SCIATICA: Status: ACTIVE | Noted: 2023-02-16

## 2023-02-16 PROBLEM — R06.02 SHORTNESS OF BREATH: Status: ACTIVE | Noted: 2023-02-16

## 2023-02-16 PROCEDURE — 99024 POSTOP FOLLOW-UP VISIT: CPT | Performed by: PHYSICIAN ASSISTANT

## 2023-02-16 NOTE — PROGRESS NOTES
Patient being seen for today for Post-op  .    Subjective    Deisy Lama is a 72 y.o. female that presents with Post-op  .    HPI  Previously: She is status post middle invasive lumbar lateral recess decompression and foraminotomies using a left approach at L2-L3 on 1/27/2023.    Today: The left leg pain is improved after surgery, but not fully resolved today.    She denies any new complaints today.     reports that she has never smoked. She has never used smokeless tobacco.    Review of Systems   Musculoskeletal: Positive for back pain.       Objective   Vitals:    02/16/23 1357   BP: 153/70   Pulse: 66        Physical Exam  Constitutional:       Appearance: Normal appearance. She is obese.   Pulmonary:      Effort: Pulmonary effort is normal.   Musculoskeletal:         General: No tenderness.      Comments: SLR negative bilaterally   Skin:     Comments: Left lumbar incision is well-healed without erythema or discharge   Neurological:      General: No focal deficit present.      Mental Status: She is alert and oriented to person, place, and time.      Sensory: No sensory deficit.      Motor: No weakness.      Deep Tendon Reflexes: Reflexes normal.   Psychiatric:         Mood and Affect: Mood normal.         Behavior: Behavior normal.          Result Review   None.     Assessment and Plan {CC Problem List  Visit Diagnosis  ROS  Review (Popup)  Health Maintenance  Quality  BestPractice  Medications  SmartSets  SnapShot Encounters  Media :23}   Diagnoses and all orders for this visit:    1. Herniated nucleus pulposus, L2-3 left (Primary)    2. Status post lumbar surgery    She is doing well after surgery.    She will continue physical restrictions for at least the next 3 weeks, then resume normal activity as tolerated.    I am recommending the following restrictions: No heavy lifting greater than 10 lb, limit twisting and bending at the waist, avoidance of strenuous activity. She will walk as  tolerated.    She will monitor her symptoms and notify us of change.    She will follow-up here PRN.  Follow Up {Instructions Charge Capture  Follow-up Communications :23}   Return if symptoms worsen or fail to improve.

## 2023-02-24 ENCOUNTER — OFFICE VISIT (OUTPATIENT)
Dept: NEUROSURGERY | Facility: CLINIC | Age: 73
End: 2023-02-24
Payer: MEDICARE

## 2023-02-24 VITALS
WEIGHT: 186 LBS | DIASTOLIC BLOOD PRESSURE: 51 MMHG | BODY MASS INDEX: 32.96 KG/M2 | SYSTOLIC BLOOD PRESSURE: 113 MMHG | HEIGHT: 63 IN | HEART RATE: 62 BPM

## 2023-02-24 DIAGNOSIS — M54.42 ACUTE MIDLINE LOW BACK PAIN WITH LEFT-SIDED SCIATICA: ICD-10-CM

## 2023-02-24 DIAGNOSIS — Z98.890 STATUS POST LUMBAR SURGERY: ICD-10-CM

## 2023-02-24 DIAGNOSIS — M51.26 HERNIATED NUCLEUS PULPOSUS, L2-3 LEFT: Primary | ICD-10-CM

## 2023-02-24 PROCEDURE — 99024 POSTOP FOLLOW-UP VISIT: CPT | Performed by: PHYSICIAN ASSISTANT

## 2023-02-24 NOTE — PROGRESS NOTES
Patient being seen for today for Post-op, Leg Pain (Left leg to mid thigh), and Tingling (Left leg to calf)  .    Subjective    Deisy Lama is a 72 y.o. female that presents with Post-op, Leg Pain (Left leg to mid thigh), and Tingling (Left leg to calf)  .    HPI  Previously: Last seen on 2/16/2023 for 3-week postoperative follow-up after a minimally invasive lumbar lateral recess decompression and foraminotomies using a left approach at L2-L3 on 1/27/2023.  She was doing well at that time.  She was going to continue physical restrictions for at least 3 weeks and follow-up as needed.    Today: She has started to have pain in the left leg and thigh 3-4 days ago. It started suddenly. There was no accident or injury.     reports that she has never smoked. She has never used smokeless tobacco.    Review of Systems   Musculoskeletal: Positive for back pain.       Objective   Vitals:    02/24/23 1209   BP: 113/51   Pulse: 62        Physical Exam  Constitutional:       Appearance: Normal appearance. She is obese.   Pulmonary:      Effort: Pulmonary effort is normal.   Musculoskeletal:         General: No tenderness.      Comments: SLR on the left causes pain in the left groin   Skin:     Comments: Lumbar incision is well-healed without erythema or discharge   Neurological:      General: No focal deficit present.      Mental Status: She is alert and oriented to person, place, and time.      Sensory: No sensory deficit.      Motor: No weakness.      Deep Tendon Reflexes: Reflexes normal.   Psychiatric:         Mood and Affect: Mood normal.         Behavior: Behavior normal.          Result Review   None.     Assessment and Plan {CC Problem List  Visit Diagnosis  ROS  Review (Popup)  [facility] Maintenance  Quality  BestPractice  Medications  SmartSets  SnapShot Encounters  Media :23}   Diagnoses and all orders for this visit:    1. Herniated nucleus pulposus, L2-3 left (Primary)  -     Ambulatory Referral to Pain  Management  -     IR Myelogram Lumbar Spine; Future    2. Status post lumbar surgery  -     Ambulatory Referral to Pain Management  -     IR Myelogram Lumbar Spine; Future    3. Acute midline low back pain with left-sided sciatica  -     Ambulatory Referral to Pain Management  -     IR Myelogram Lumbar Spine; Future    Other orders  -     No Lab Testing Needed; Standing    Her pain has come back in the left thigh.    We discussed that she may consider a trial of PT, which she is deferring today.    She would like to have imaging now to assess the lumbar pathology at this time, as she did recently have the L2-L3 laminectomy and foraminotomy on the left.    She cannot have an MRI. I am going to order a CT myelogram of the lumbar spine to assess her lumbar spine further, particular fibrosis or nerve root impingement that may explain her returned left lower extremity pain complaints.    She will follow-up in 4 weeks to reassess and review imaging.  Follow Up {Instructions Charge Capture  Follow-up Communications :23}   Return in about 4 weeks (around 3/24/2023).

## 2023-02-28 ENCOUNTER — TELEPHONE (OUTPATIENT)
Dept: NEUROSURGERY | Facility: CLINIC | Age: 73
End: 2023-02-28
Payer: MEDICARE

## 2023-02-28 DIAGNOSIS — M54.42 ACUTE MIDLINE LOW BACK PAIN WITH LEFT-SIDED SCIATICA: ICD-10-CM

## 2023-02-28 DIAGNOSIS — M51.26 HERNIATED NUCLEUS PULPOSUS, L2-3 LEFT: Primary | ICD-10-CM

## 2023-02-28 DIAGNOSIS — Z98.890 STATUS POST LUMBAR SURGERY: ICD-10-CM

## 2023-02-28 NOTE — TELEPHONE ENCOUNTER
"Per imaging they need also need a \" [id number] CT lumbar spine w intrathecil contrast\" ordered as well     " Normal rate, regular rhythm.  Heart sounds S1, S2.  No murmurs, rubs or gallops.

## 2023-03-01 ENCOUNTER — TELEPHONE (OUTPATIENT)
Dept: NEUROSURGERY | Facility: CLINIC | Age: 73
End: 2023-03-01
Payer: MEDICARE

## 2023-03-01 DIAGNOSIS — Z01.812 PRE-PROCEDURE LAB EXAM: Primary | ICD-10-CM

## 2023-03-01 DIAGNOSIS — M54.42 ACUTE MIDLINE LOW BACK PAIN WITH LEFT-SIDED SCIATICA: ICD-10-CM

## 2023-03-01 DIAGNOSIS — Z98.890 STATUS POST LUMBAR SURGERY: ICD-10-CM

## 2023-03-01 DIAGNOSIS — M51.26 HERNIATED NUCLEUS PULPOSUS, L2-3 LEFT: Primary | ICD-10-CM

## 2023-03-01 DIAGNOSIS — M51.26 HERNIATED NUCLEUS PULPOSUS, L2-3 LEFT: ICD-10-CM

## 2023-03-01 DIAGNOSIS — M79.606 PAIN OF LOWER EXTREMITY, UNSPECIFIED LATERALITY: ICD-10-CM

## 2023-03-01 NOTE — TELEPHONE ENCOUNTER
Orders for the blood testing have been added now.     Message has been sent to Dr. Rodriguez about medication.

## 2023-03-01 NOTE — TELEPHONE ENCOUNTER
Caller: Deisy Lama    Relationship to patient: Self    Best call back number: [phone]    Patient is needing:     REC'D CALL FROM PATIENT WHO REC'D A CALL FROM THE HOSPITAL WHO ADVISED PATIENT TO REACH OUT TO USE TO GET COAGULATION LABWORK ORDERED FOR HER MYELOGRAM.        PLEASE CALL TO [facility]

## 2023-03-01 NOTE — TELEPHONE ENCOUNTER
I think I got the orders for the blood testing added now.    I will forward a message to Dr. Rodriguez about medication.

## 2023-03-01 NOTE — TELEPHONE ENCOUNTER
PT CALLED AGAIN ASKING IF DR PAN CAN GIVE HER PAIN MEDS UNTIL HER APPT WITH PAIN [facility] ON 3-23-23.     PLEASE CALL TO [facility]    THANK YOU,

## 2023-03-02 DIAGNOSIS — Z98.890 STATUS POST LUMBAR SURGERY: Primary | ICD-10-CM

## 2023-03-02 RX ORDER — HYDROCODONE BITARTRATE AND ACETAMINOPHEN 5; 325 MG/1; MG/1
1 TABLET ORAL EVERY 8 HOURS PRN
Qty: 20 TABLET | Refills: 0 | Status: SHIPPED | OUTPATIENT
Start: 2023-03-02

## 2023-03-02 NOTE — TELEPHONE ENCOUNTER
Caller: Deisy Lama    Relationship: Self    Best call back number: [phone]    What is the best time to reach you:ANYTIME    Who are you requesting to speak with (clinical staff, provider,  specific staff member):CLINICAL     Do you know the name of the person who called: NA    What was the call regarding:PT CALLED BACK AND STATES THAT SHE IS IN PAIN-SHE WAS NOT ABLE TO GET INTO PAIN MANAGEMENT RIGHT AWAY-ADVISED PT THAT HER MESSAGE IS IN THE CHART-PT WOULD LIKE A CALL BACK WHEN AND IF ORDER IS SENT FOR MEDICATION THANK YOU!    Do you require a callback: YES PLEASE

## 2023-03-17 ENCOUNTER — HOSPITAL ENCOUNTER (OUTPATIENT)
Dept: INTERVENTIONAL RADIOLOGY/VASCULAR | Facility: HOSPITAL | Age: 73
Discharge: HOME | End: 2023-03-17
Payer: MEDICARE

## 2023-03-17 ENCOUNTER — LAB (OUTPATIENT)
Dept: LAB | Facility: HOSPITAL | Age: 73
End: 2023-03-17
Payer: MEDICARE

## 2023-03-17 ENCOUNTER — HOSPITAL ENCOUNTER (OUTPATIENT)
Dept: CT IMAGING | Facility: HOSPITAL | Age: 73
Discharge: HOME | End: 2023-03-17
Payer: MEDICARE

## 2023-03-17 VITALS
OXYGEN SATURATION: 96 % | HEART RATE: 62 BPM | RESPIRATION RATE: 20 BRPM | SYSTOLIC BLOOD PRESSURE: 163 MMHG | DIASTOLIC BLOOD PRESSURE: 78 MMHG

## 2023-03-17 DIAGNOSIS — Z98.890 STATUS POST LUMBAR SURGERY: ICD-10-CM

## 2023-03-17 DIAGNOSIS — M51.26 HERNIATED NUCLEUS PULPOSUS, L2-3 LEFT: ICD-10-CM

## 2023-03-17 DIAGNOSIS — M54.42 ACUTE MIDLINE LOW BACK PAIN WITH LEFT-SIDED SCIATICA: ICD-10-CM

## 2023-03-17 LAB
APTT PPP: 26.5 SECONDS (ref 24.2–34.2)
INR PPP: 0.97 (ref 0.86–1.15)
PLATELET # BLD AUTO: 479 10*3/MM3 (ref 140–450)
PROTHROMBIN TIME: 13 SECONDS (ref 11.8–14.9)

## 2023-03-17 PROCEDURE — 25510000001 IOPAMIDOL 41 % SOLUTION: Performed by: PHYSICIAN ASSISTANT

## 2023-03-17 PROCEDURE — 72240 MYELOGRAPHY NECK SPINE: CPT

## 2023-03-17 PROCEDURE — 85730 THROMBOPLASTIN TIME PARTIAL: CPT | Performed by: PHYSICIAN ASSISTANT

## 2023-03-17 PROCEDURE — 72132 CT LUMBAR SPINE W/DYE: CPT

## 2023-03-17 PROCEDURE — 85610 PROTHROMBIN TIME: CPT | Performed by: PHYSICIAN ASSISTANT

## 2023-03-17 PROCEDURE — 62304 MYELOGRAPHY LUMBAR INJECTION: CPT

## 2023-03-17 PROCEDURE — 85049 AUTOMATED PLATELET COUNT: CPT | Performed by: PHYSICIAN ASSISTANT

## 2023-03-17 RX ORDER — LIDOCAINE HYDROCHLORIDE 20 MG/ML
20 INJECTION, SOLUTION INFILTRATION; PERINEURAL ONCE
Status: COMPLETED | OUTPATIENT
Start: 2023-03-17 | End: 2023-03-17

## 2023-03-17 RX ADMIN — IOPAMIDOL 20 ML: 408 INJECTION, SOLUTION INTRATHECAL at 10:08

## 2023-03-17 RX ADMIN — LIDOCAINE HYDROCHLORIDE 9 ML: 20 INJECTION, SOLUTION INFILTRATION; PERINEURAL at 10:05

## 2023-03-17 RX ADMIN — SODIUM BICARBONATE 1 MEQ: 84 INJECTION, SOLUTION INTRAVENOUS at 10:05

## 2023-03-17 NOTE — PROGRESS NOTES
To. Xray holding Post myleogram. Bandaid to lower back, with scant blood noted. Area soft. No complaints voiced. No distress noted. Diet yulissa given to drink.      Yes

## 2023-04-03 ENCOUNTER — TRANSCRIBE ORDERS (OUTPATIENT)
Dept: VASCULAR SURGERY | Facility: HOSPITAL | Age: 73
End: 2023-04-03
Payer: MEDICARE

## 2023-04-03 DIAGNOSIS — I71.43 INFRARENAL ABDOMINAL AORTIC ANEURYSM (AAA) WITHOUT RUPTURE: Primary | ICD-10-CM

## 2023-04-04 ENCOUNTER — OFFICE VISIT (OUTPATIENT)
Dept: NEUROSURGERY | Facility: CLINIC | Age: 73
End: 2023-04-04
Payer: MEDICARE

## 2023-04-04 VITALS
HEART RATE: 64 BPM | SYSTOLIC BLOOD PRESSURE: 147 MMHG | WEIGHT: 184 LBS | DIASTOLIC BLOOD PRESSURE: 69 MMHG | HEIGHT: 63 IN | BODY MASS INDEX: 32.6 KG/M2

## 2023-04-04 DIAGNOSIS — M54.42 CHRONIC MIDLINE LOW BACK PAIN WITH LEFT-SIDED SCIATICA: ICD-10-CM

## 2023-04-04 DIAGNOSIS — M51.36 DDD (DEGENERATIVE DISC DISEASE), LUMBAR: Primary | ICD-10-CM

## 2023-04-04 DIAGNOSIS — G89.29 CHRONIC MIDLINE LOW BACK PAIN WITH LEFT-SIDED SCIATICA: ICD-10-CM

## 2023-04-04 DIAGNOSIS — M47.816 FACET ARTHROPATHY, LUMBAR: ICD-10-CM

## 2023-04-04 DIAGNOSIS — Z98.890 STATUS POST LUMBAR SURGERY: ICD-10-CM

## 2023-04-04 RX ORDER — OXYCODONE HYDROCHLORIDE AND ACETAMINOPHEN 325; 5 MG/5ML; MG/5ML
SOLUTION ORAL EVERY 8 HOURS SCHEDULED
COMMUNITY
Start: 2023-03-23

## 2023-04-04 NOTE — PROGRESS NOTES
"Patient being seen for today for Follow-up  .    Subjective    Deisy Lama is a 72 y.o. female that presents with Follow-up  .    HPI  Previously: Last seen on 2/24/2023 for complaints of low back pain and left thigh pain.  She deferred trial of physical therapy.  She was requesting lumbar spine imaging.  She was status post L2-L3 laminectomy and foraminotomies on the left on 1/27/2023.  She cannot have MRIs.  There was an order for a CT myelogram of the lumbar spine to assess her lumbar spine further.    Today: She continues to have pain in the lower back and to the left leg to the knee. She rates it \"always a 10\". She denies numbness numbness/tingling in the left leg.    She reports subjective weakness in both legs.    She did complete the CT myelogram.     reports that she has never smoked. She has never used smokeless tobacco.    Review of Systems   Musculoskeletal: Positive for back pain.   Neurological: Positive for weakness. Negative for numbness.       Objective   Vitals:    04/04/23 1503   BP: 147/69   Pulse: 64        Physical Exam  Constitutional:       Appearance: Normal appearance. She is obese.   Pulmonary:      Effort: Pulmonary effort is normal.   Musculoskeletal:         General: Tenderness (midline lumbar spine) present.      Comments: SLR on the left causes pain in the posterior leg   Neurological:      General: No focal deficit present.      Mental Status: She is alert and oriented to person, place, and time.      Sensory: No sensory deficit.      Motor: No weakness.      Deep Tendon Reflexes: Reflexes normal.   Psychiatric:         Mood and Affect: Mood normal.         Behavior: Behavior normal.          Result Review   I have personally reviewed the CT of lumbar spine with intrathecal contrast from 3/17/2023 which shows relatively severe degenerative disc disease and facet arthritis throughout the lumbar spine.  There is retrolisthesis of L2 on L3.  There is some mild central canal stenosis " at L3-L4 with mild to moderate bilateral foraminal stenosis most likely worse on the left.  There is moderate central canal stenosis at L4-L5 with at least moderate bilateral foraminal stenosis worse on the left.  There is mild spinal stenosis at L5-S1 with moderate to severe right foraminal stenosis.     Assessment and Plan {CC Problem List  Visit Diagnosis  ROS  Review (Popup)  [facility]  BestPractice  Medications  SmartSets  SnapShot Encounters  Media :23}   Diagnoses and all orders for this visit:    1. DDD (degenerative disc disease), lumbar (Primary)  -     Ambulatory Referral to Physical Therapy Evaluate and treat; Heat, Electrotherapy; E-stim; Stretching (lumbar, core), ROM, Strengthening    2. Facet arthropathy, lumbar  -     Ambulatory Referral to Physical Therapy Evaluate and treat; Heat, Electrotherapy; E-stim; Stretching (lumbar, core), ROM, Strengthening    3. Status post lumbar surgery  -     Ambulatory Referral to Physical Therapy Evaluate and treat; Heat, Electrotherapy; E-stim; Stretching (lumbar, core), ROM, Strengthening    4. Chronic midline low back pain with left-sided sciatica  -     Ambulatory Referral to Physical Therapy Evaluate and treat; Heat, Electrotherapy; E-stim; Stretching (lumbar, core), ROM, Strengthening    She has multilevel changes.    Her pain is in the back and down the left leg to the knee. She does have some foraminal narrowing most likely worse on the left at L3-L4 and L4-L5.    She does not feel strongly towards having another surgery, and I expect it is less likely to help her for her complaints considering the non-specific pain.    We discussed that she may benefit from physical therapy or a trial of LESB. She would like to consider physical therapy and I will refer her today.    She will follow-up here PRN.  Follow Up {Instructions Charge Capture  Follow-up Communications :23}   Return if symptoms worsen or fail to improve.

## 2023-04-11 ENCOUNTER — TELEPHONE (OUTPATIENT)
Dept: NEUROSURGERY | Facility: CLINIC | Age: 73
End: 2023-04-11
Payer: MEDICARE

## 2023-04-11 NOTE — TELEPHONE ENCOUNTER
WILLIE ELIZALDE IS OUT OF OFFICE FOR THE NEXT FOUR WEEKS DUE TO FAMILY EMERGENCY. PT WILL NEED TO BE RESCHEDULED FOR WHEN HE RETURNS.    OK FOR HUB TO READ

## 2023-05-05 ENCOUNTER — TELEPHONE (OUTPATIENT)
Dept: NEUROSURGERY | Facility: CLINIC | Age: 73
End: 2023-05-05
Payer: MEDICARE

## 2023-05-05 NOTE — TELEPHONE ENCOUNTER
Caller: Deisy Lama    Relationship to patient: Self    Best call back number: [phone]    Patient is needing:     PATIENT IS ASKING IF DRY-NEEDLING AND ULTRASOUND COULD BE ADDED TO HER PT REFERRAL.      PLEASE CALL TO ADVISE

## 2023-05-05 NOTE — TELEPHONE ENCOUNTER
Advised patient that Cabrera is out of the office until Tuesday of next week. We would reach out to her then.

## 2023-05-09 ENCOUNTER — OFFICE VISIT (OUTPATIENT)
Dept: VASCULAR SURGERY | Facility: HOSPITAL | Age: 73
End: 2023-05-09
Payer: MEDICARE

## 2023-05-09 ENCOUNTER — HOSPITAL ENCOUNTER (OUTPATIENT)
Dept: CARDIOLOGY | Facility: HOSPITAL | Age: 73
Discharge: HOME | End: 2023-05-09
Payer: MEDICARE

## 2023-05-09 VITALS
SYSTOLIC BLOOD PRESSURE: 102 MMHG | HEART RATE: 60 BPM | RESPIRATION RATE: 18 BRPM | TEMPERATURE: 96.7 F | DIASTOLIC BLOOD PRESSURE: 60 MMHG | OXYGEN SATURATION: 95 %

## 2023-05-09 DIAGNOSIS — I71.43 INFRARENAL ABDOMINAL AORTIC ANEURYSM (AAA) WITHOUT RUPTURE: ICD-10-CM

## 2023-05-09 DIAGNOSIS — I71.43 INFRARENAL ABDOMINAL AORTIC ANEURYSM (AAA) WITHOUT RUPTURE: Primary | ICD-10-CM

## 2023-05-09 LAB
ABDOMINAL DIST AORTA AP: 2.1 CM
ABDOMINAL DIST AORTA TRANS: 1.6 CM
ABDOMINAL LT COM ILIAC AP: 1 CM
ABDOMINAL LT COM ILIAC TRANS: 0.9 CM
ABDOMINAL MID AORTA AP: 4 CM
ABDOMINAL MID AORTA TRANS: 4 CM
ABDOMINAL PROX AORTA AP: 2.7 CM
ABDOMINAL PROX AORTA TRANS: 2.6 CM
ABDOMINAL RT COM ILIAC AP: 1.1 CM
ABDOMINAL RT COM ILIAC TRANS: 1 CM
MAXIMAL PREDICTED HEART RATE: 145 BPM
STRESS TARGET HR: 123 BPM

## 2023-05-09 PROCEDURE — 93978 VASCULAR STUDY: CPT

## 2023-05-09 NOTE — PROGRESS NOTES
[facility] Vascular Surgery Office Follow Up Note     Date of Encounter: 05/09/2023     MRN Number: [id number]  Name: Deisy Lama  Phone Number: [phone]     Referred By: Juwan Rodríguez APRN  PCP: Aayush Priest MD    Chief Complaint:    Chief Complaint   Patient presents with   • Aortic Aneurysm   • Follow-up     Patient is here as an annual follow up with an abdominal ultrasound.        Subjective      History of Present Illness:   Deisy Lama is a 73 y.o. female presents for annual follow-up for abdominal aortic aneurysm.  She has chronic low back pain along with sciatica pain. She denies any abdominal pain and is not a smoker.  She has lots of physical and emotional stress. She does take a statin medication daily.     Review of Systems:  ROS  Review of Systems   Constitutional: Negative.   HENT: Negative.    Cardiovascular: Negative.    Respiratory: Negative.    Skin: Negative.    Musculoskeletal:  Low back pain.    Gastrointestinal: Negative.    Neurological: Negative.    Psychiatric/Behavioral: Negative.      I have reviewed the following portions of the patient's history: allergies, current medications, past family history, past medical history, past social history, past surgical history and problem list and confirm it's accurate.    Allergies:  Allergies   Allergen Reactions   • Morphine Itching   • Dilaudid [Hydromorphone] Confusion   • Lisinopril Cough       Medications:      Current Outpatient Medications:   •  Accu-Chek Evelian Plus test strip, 1 each by Other route Daily., Disp: , Rfl:   •  atorvastatin (LIPITOR) 10 MG tablet, Take 1 tablet by mouth Every Night., Disp: , Rfl:   •  brimonidine (ALPHAGAN) 0.2 % ophthalmic solution, Administer 1 drop to both eyes 2 (Two) Times a Day., Disp: , Rfl:   •  diclofenac (VOLTAREN) 75 MG EC tablet, Take 1 tablet by mouth 2 (Two) Times a Day. LAST DOSE 1/19/23, Disp: , Rfl:   •  dorzolamide (TRUSOPT) 2 % ophthalmic solution,  Administer 1 drop to both eyes 2 (Two) Times a Day., Disp: , Rfl:   •  furosemide (LASIX) 80 MG tablet, Take 1 tablet by mouth Daily. (Patient taking differently: Take 1 tablet by mouth Every Other Day. INST PER ANESTHESIA PROTOCOL), Disp: 90 tablet, Rfl: 3  •  glimepiride (AMARYL) 2 MG tablet, Take 1 tablet by mouth Every Morning Before Breakfast. INST PER ANESTHESIA PROTOCOL, Disp: , Rfl:   •  hydroCHLOROthiazide (HYDRODIURIL) 25 MG tablet, Take 1 tablet by mouth Daily. INST PER ANESTHESIA PROTOCOL, Disp: , Rfl:   •  HYDROcodone-acetaminophen (NORCO) 5-325 MG per tablet, Take 1 tablet by mouth Every 8 (Eight) Hours As Needed for Severe Pain., Disp: 20 tablet, Rfl: 0  •  levothyroxine (SYNTHROID, LEVOTHROID) 112 MCG tablet, Take 1 tablet by mouth Daily., Disp: , Rfl:   •  losartan (COZAAR) 100 MG tablet, Take 1 tablet by mouth Daily. INST PER ANESTHESIA PROTOCOL, Disp: , Rfl:   •  metoprolol succinate XL (TOPROL-XL) 50 MG 24 hr tablet, Take 1 tablet by mouth 2 (two) times a day. INST PER ANESTHESIA PROTOCOL, Disp: , Rfl:   •  omeprazole (priLOSEC) 40 MG capsule, omeprazole 40 mg capsule,delayed release, Disp: , Rfl:   •  oxyCODONE-acetaminophen (ROXICET,PERCOCET) 5-325 MG/5ML solution, Every 8 (Eight) Hours., Disp: , Rfl:   •  rOPINIRole (REQUIP) 4 MG tablet, Take 1 tablet by mouth Every Morning. Pt notes she takes one tablet in the morning and 3 tablets at bedtime., Disp: , Rfl:   •  rOPINIRole (REQUIP) 4 MG tablet, Take 3 tablets by mouth Every Night., Disp: , Rfl:   •  sertraline (ZOLOFT) 100 MG tablet, Take 1 tablet by mouth Daily Before Supper., Disp: , Rfl:   •  spironolactone (ALDACTONE) 25 MG tablet, Take 1 tablet by mouth Daily. (Patient taking differently: Take 1 tablet by mouth Daily As Needed. USUALLY TAKE WITH FUROSEMIDE EVERY 2-3 DAYS), Disp: 90 tablet, Rfl: 3    History:   Past Medical History:   Diagnosis Date   • AAA (abdominal aortic aneurysm)     Pt reported/BEING MONITORED   • Acid reflux    •  Arthritis    • Bladder disorder     LEAKAGE   • Blood disease     ABNORMAL PLATELET COUNT/CHRONIC ELEVATED WBC,NO LEUKEMIA   • Congestive heart failure (CHF)     MANAGED W/DIURETIC, NO RECENT PROBLEMS   • 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   • Diabetes mellitus     ORAL MED   • Dysrhythmia    • Heart disease     PM/ICD, FOLLOWS BRADLEY   • Hiatal hernia    • History of transfusion     LAST IN '90'S   • Hyperlipidemia LDL goal <100 08/23/2021   • Hypertension    • Kidney stone     NO CURRENT ISSUES   • Limb swelling     LOWER LEGS SWELL   • Presence of biventricular implantable cardioverter-defibrillator (ICD) 08/23/2021   • Sciatica     LEFT   • Shortness of breath     WITH EXERTION   • Sleep apnea    • Thyroid disorder     hypo       Past Surgical History:   Procedure Laterality Date   • APPENDECTOMY     • BLADDER SURGERY      REPAIR   • CARDIAC DEFIBRILLATOR PLACEMENT     • CATARACT EXTRACTION Bilateral     WITH LENS IMPLANT   • CHOLECYSTECTOMY     • COLONOSCOPY     • GASTROPLASTY      FOR WEIGHT LOSS   • HEEL SPUR SURGERY     • HERNIA REPAIR      ABDOMINAL   • HYSTERECTOMY     • INGUINAL HERNIA REPAIR Bilateral 8/31/2022    Procedure: INGUINAL HERNIA REPAIR LAPAROSCOPIC WITH Clever SenseI ROBOT, lysis of adhesions;  Surgeon: Lauri Celis MD;  Location: [facility];  Service: Robotics - DaVinci;  Laterality: Bilateral;   • KIDNEY STONE SURGERY     • LUMBAR DISCECTOMY Left 1/27/2023    Procedure: MINIMALLY INVASIVE LUMBAR LATERAL RECESS DECOMPRESSION AND FORAMINOTOMY, LEFT APPROACH, LUMBAR 2-LUMBAR 3;  Surgeon: Ralf Rodriguez MD;  Location: [facility];  Service: Neurosurgery;  Laterality: Left;   • PACEMAKER IMPLANTATION     • SPLENECTOMY      TRAUMATIC INJURY R/T GASTROPLASTY SURGERY   • TONSILLECTOMY         Social History     Socioeconomic History   • Marital status:    Tobacco Use   • Smoking status: Never   • Smokeless tobacco: Never   Vaping Use   • Vaping Use: Never used   Substance and Sexual Activity   •  Alcohol use: Never   • Drug use: Never   • Sexual activity: Defer        Family History   Problem Relation Age of Onset   • Stroke Mother    • Diabetes Mother    • Arthritis Mother    • Osteoporosis Mother    • Heart disease Father    • Cancer Father         PROSTATE   • Arthritis Father    • Osteoporosis Father    • Diabetes Brother    • Cancer Son    • Malig Hyperthermia Neg Hx        Objective     Physical Exam:  Vitals:    05/09/23 0835   BP: 102/60   BP Location: Right arm   Patient Position: Sitting   Cuff Size: Large Adult   Pulse: 60   Resp: 18   Temp: 96.7 °F (35.9 °C)   TempSrc: Temporal   SpO2: 95%   PainSc:   8   PainLoc: Back      There is no height or weight on file to calculate BMI.    Physical Exam  Physical Exam  Constitutional:       Appearance: Normal appearance.   HENT:      Head: Normocephalic.   Cardiovascular:      Rate and Rhythm: Normal rate.      Pulses: Normal pulses.   Pulmonary:      Effort: Pulmonary effort is normal.   Musculoskeletal:         General: Normal range of motion.      Cervical back: Normal range of motion.   Skin:     General: Skin is warm and dry.      Capillary Refill: Capillary refill takes less than 2 seconds.      Neurological:      General: No focal deficit present.      Mental Status: Alert and oriented to person, place, and time.   Psychiatric:         Mood and Affect: Mood normal.         Behavior: Behavior normal.    Imaging/Labs:  I have reviewed the preliminary results of the abdominal duplex performed today, 5/9/2023.  The duplex reveals the maximal diameter at mid abdominal aortic aneurysm 4.0 cm.  The duplex reveals no significant change when compared to CT that was performed on 5/31/2022.    I have also reviewed a CT of the lumbar spine with intrathecal contrast performed on 3/17/2023.  That CT measures the infrarenal abdominal aortic aneurysm at approximately 5 cm however it is noted that it is only partially imaged in the study.    Assessment / Plan       Assessment / Plan:  Diagnoses and all orders for this visit:    1. Infrarenal abdominal aortic aneurysm (AAA) without rupture (Primary)  -     Duplex Aorta IVC Iliac Graft Complete CAR; Future     Ms. Lama has a infrarenal abdominal aortic aneurysm that is measured approximately 4.0 cm on the abdominal ultrasound performed today, which is consistent with the previous CT that she had in May 2022.  However, after reviewing her chart I see that she had a CT of the lumbar spine in March of this year and measured the aneurysm at approximately 5 cm and is noted that is only partially imaged in the study.  I will review the CT with Dr. Pittman and I will call Ms. Lama and advise accordingly.    I have answered all of her questions and she is in agreement with the plan at this time.  Thank you for allowing me to participate in your patient's care.      Follow Up:   No follow-ups on file.   Or sooner for any further concerns or worsening sign and symptoms. If unable to reach us in the office please dial 911 or go to the nearest emergency department.      Juwan BOYD  [facility] Vascular Surgery

## 2023-05-15 ENCOUNTER — TELEPHONE (OUTPATIENT)
Dept: VASCULAR SURGERY | Facility: HOSPITAL | Age: 73
End: 2023-05-15
Payer: MEDICARE

## 2023-05-15 NOTE — TELEPHONE ENCOUNTER
Called patient back in response to her previous phone call. Left voice mail explaining APRN will call her after discussion with Dr. Pittman as explained in APRN notes from last visit.

## 2023-05-15 NOTE — TELEPHONE ENCOUNTER
Caller: Deisy Lama    Relationship: Self    Best call back number: [phone]     Who are you requesting to speak with (clinical staff, provider,  specific staff member): CLINICAL     What was the call regarding: PATIENT WOULD LIKE AN UPDATE ON IF SHE NEEDS A CT AS DISCUSSED AT LAST APPOINTMENT.     Do you require a callback: YES PLEASE

## 2023-05-16 ENCOUNTER — TELEPHONE (OUTPATIENT)
Dept: VASCULAR SURGERY | Facility: HOSPITAL | Age: 73
End: 2023-05-16
Payer: MEDICARE

## 2023-05-16 NOTE — TELEPHONE ENCOUNTER
I had tried to call patient on Friday but was unable to reach her, she states she just got her replacement phone on Sunday. I explained the results of the MRI and ultrasound and the findings that I reviewed with Dr. Pittman. He believes her aneurysm is stable at 4 cm. We can continue with annual surveillance.  Follow-up in 1 year with repeat aortic ultrasound.  I have answered all of her questions she has voiced understanding and is in agreement with the plan.

## 2023-05-18 ENCOUNTER — TREATMENT (OUTPATIENT)
Dept: PHYSICAL THERAPY | Facility: CLINIC | Age: 73
End: 2023-05-18
Payer: MEDICARE

## 2023-05-18 DIAGNOSIS — R26.9 GAIT DISTURBANCE: ICD-10-CM

## 2023-05-18 DIAGNOSIS — M54.42 CHRONIC LEFT-SIDED LOW BACK PAIN WITH LEFT-SIDED SCIATICA: Primary | ICD-10-CM

## 2023-05-18 DIAGNOSIS — G89.29 CHRONIC LEFT-SIDED LOW BACK PAIN WITH LEFT-SIDED SCIATICA: Primary | ICD-10-CM

## 2023-05-18 DIAGNOSIS — Z98.890 HISTORY OF LAMINECTOMY: ICD-10-CM

## 2023-05-18 NOTE — PROGRESS NOTES
Physical Therapy Initial Evaluation and Plan of Care     [address] [place] [id number]    Patient: Deisy Lama   : 1950  Diagnosis/ICD-10 Code:  Chronic left-sided low back pain with left-sided sciatica [M54.42, G89.29]  Referring practitioner: Cabrera Werner PA-C  Date of Initial Visit: 2023  Today's Date: 2023  Patient seen for 1 sessions           Subjective Questionnaire: Oswestry: 29 = 58% limitation      Subjective: Pt presents to therapy with low back pain radiating into her left hip and down her leg, sometimes down to the foot. She is most comfortable laying on her right side or on her back. Feels like she can hardly get out of bed some days. She's undergone a laminectomy on the left L2-L3 earlier this year. Imaging shows further foraminal narrowing on the left at L3-L4 and L4-L5. She has tried many interventions in the past, none of which have helped, but she was referred to try dry needling. She also has scoliosis.    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     Current Pain 8/10  Best Pain: 6/10  Worst Pain: 10/10  Quality of pain: aching, sharp, radiating     Aggravating Factors: walking  Easing Factors: lying down     Past Medical Hx: L2-L3 laminectomy, CHF, AAA, DM    Patient Goals: reduce her pain      Objective          Palpation   Left   Muscle spasm in the erector spinae.   Tenderness of the erector spinae.     Right   Muscle spasm in the erector spinae. Tenderness of the erector spinae.     Additional Palpation Details  Palpable trigger points in the gluteus medius, piriformis     Active Range of Motion     Lumbar   Flexion: 40 degrees   Extension: Active lumbar extension: -15.   Left lateral flexion: 10 degrees   Right lateral flexion: 20 degrees     Additional Active Range of Motion Details  Rotation limited 75% bilaterally    Strength/Myotome Testing      Left Hip   Planes of Motion   Flexion: 4-  Extension: 3+  Abduction: 3+  Adduction: 4    Right Hip   Planes of Motion   Flexion: 4-  Extension: 4-  Abduction: 4-  Adduction: 4+    Ambulation     Comments   Slowed gait, trunk lean forward and right laterally      See Exercise, Manual, and Modality Logs for complete treatment.     Assessment & Plan     Assessment  Impairments: abnormal muscle firing, abnormal or restricted ROM, activity intolerance, impaired physical strength and pain with function  Functional Limitations: carrying objects, lifting, walking, uncomfortable because of pain, sitting and standing  Assessment details: The patient presents to physical therapy with complaints of low back pain with radiation into the left lower extremity. The patient presents with associated lower extremity weakness, lumbar stiffness, and functional deficits (OSWESTRY). The patient would benefit from skilled PT intervention to address the above mentioned functional limitations. Pt gives written and verbal consent with description of procedure and review of risks, benefits, precautions, contraindications, and potential side effects. Education provided for post needling soreness and expectations. All dry needling performed to appropriate depth with bony backdrop, or threading to avoid any anatomic structures that are contraindicated. No adverse events noted.       Prognosis: good    Goals  Plan Goals: LOW BACK PROBLEMS:    1. The patient complains of low back pain.  LTG 1: 12 weeks:  The patient will report a pain rating of 3/10 or better at worst in order to improve  tolerance to activities of daily living and improve sleep quality.  STATUS:  New  STG 1a: 6 weeks:  The patient will report a pain rating of 5/10 or better at its worst.  STATUS:  New  TREATMENT:  Therapeutic exercises, manual therapy, aquatic therapy, home exercise   instruction, and modalities as needed for pain to include:  electrical stimulation, moist  heat, ice,   ultrasound, and diathermy.      2. The patient demonstrates weakness of the B hip.  LTG 2: 12 weeks:  The patient will demonstrate 4+ /5 strength for B hip flexion, abduction,  and extension in order to improve hip stability.  STATUS:  New  STG 2a: 6 weeks:  The patient will demonstrate 4 /5 strength for B hip flexion, abduction,  and extension.  STATUS:  New  TREATMENT: Therapeutic exercises, manual therapy, aquatic therapy, home exercise instruction,  and modalities as needed for pain to include:  electrical stimulation, moist heat, ice, ultrasound, and   diathermy.        3. Mobility: Walking/Moving Around Functional Limitation    LTG 3: 12 weeks:  The patient will demonstrate 1-19 % limitation by achieving a score of 1-9 on the VELIA.  STATUS:  New  STG 3 a: 6 weeks:  The patient will demonstrate 20-39 % limitation by achieving a score of 10-19 on the VELIA.    STATUS:  New  TREATMENT:  Manual therapy, therapeutic exercise, home exercise instruction, and modalities as needed to include: moist heat, electrical stimulation, and ultrasound.         Plan  Therapy options: will be seen for skilled therapy services  Planned modality interventions: TENS, cryotherapy, thermotherapy (hydrocollator packs), traction and dry needling  Other planned modality interventions: aquatic therapy  Planned therapy interventions: manual therapy, stretching, strengthening, therapeutic activities, neuromuscular re-education, home exercise program, joint mobilization, functional ROM exercises, soft tissue mobilization, spinal/joint mobilization, flexibility and gait training  Other planned therapy interventions: aquatic therapy  Frequency: 3x week  Duration in weeks: 12  Treatment plan discussed with: patient        Visit Diagnoses:    ICD-10-CM ICD-9-CM   1. Chronic left-sided low back pain with left-sided sciatica  M54.42 724.2    G89.29 724.3     338.29   2. History of lumbar laminectomy  Z98.890 V45.89   3. Gait disturbance   R26.9 781.2       History # of Personal Factors and/or Comorbidities: HIGH (3+)  Examination of Body System(s): # of elements: MODERATE (3)  Clinical Presentation: STABLE   Clinical Decision Making: MODERATE      Timed:         Manual Therapy:    0     mins  53217;     Therapeutic Exercise:    0     mins  30188;     Neuromuscular Rock:    0    mins  47460;    Therapeutic Activity:     0     mins  65381;     Gait Trainin     mins  29467;     Ultrasound:     0     mins  17025;    Ionto                               0    mins   60443  Self Care                       0     mins   08111        Un-Timed:  Electrical Stimulation:    0     mins  00502 ( );  Dry Needling     10     mins DN trial today  Canalith Repos    0     mins 31237  Traction     0     mins 55221  Low Eval     0     Mins  25462  Mod Eval     30     Mins  55904  High Eval                       0     Mins  27716  Re-Eval                           0    mins  05668    Timed Treatment:   0   mins   Total Treatment:     40   mins    PT SIGNATURE: Onofre De Jesus PT     Electronically signed 2023    [place] License: PT - [id number]     Initial Certification  Certification Period: 2023 thru 8/15/2023  I certify that the therapy services are furnished while this patient is under my care.  The services outlined above are required by this patient, and will be reviewed every 90 days.     PHYSICIAN: Cabrera Werner PA-C   NPI: [id number]                                        DATE:     Please sign and return via fax to [phone]. Thank you, [facility].

## 2023-05-23 ENCOUNTER — TREATMENT (OUTPATIENT)
Dept: PHYSICAL THERAPY | Facility: CLINIC | Age: 73
End: 2023-05-23
Payer: MEDICARE

## 2023-05-23 DIAGNOSIS — M54.42 CHRONIC LEFT-SIDED LOW BACK PAIN WITH LEFT-SIDED SCIATICA: Primary | ICD-10-CM

## 2023-05-23 DIAGNOSIS — Z98.890 HISTORY OF LAMINECTOMY: ICD-10-CM

## 2023-05-23 DIAGNOSIS — G89.29 CHRONIC LEFT-SIDED LOW BACK PAIN WITH LEFT-SIDED SCIATICA: Primary | ICD-10-CM

## 2023-05-23 DIAGNOSIS — R26.9 GAIT DISTURBANCE: ICD-10-CM

## 2023-05-23 PROCEDURE — 97110 THERAPEUTIC EXERCISES: CPT | Performed by: PHYSICAL THERAPIST

## 2023-05-23 NOTE — PROGRESS NOTES
Physical Therapy Daily Treatment Note  [address] [place] [id number]    Patient: Deisy Lama   : 1950  Diagnosis/ICD-10 Code:  Chronic left-sided low back pain with left-sided sciatica [M54.42, G89.29]  Referring practitioner: Cabrera Werner PA-C  Date of Initial Visit: Type: THERAPY  Noted: 2023  Today's Date: 2023  Patient seen for 2 sessions           Subjective : Patient reports she is worse after trying dry needling, she feels like the needling aggravated the pain more. Upon discussion, she is thinking of trying chiropractic next, then if that doesn't help maybe return to the [facility] in [place].     Objective   See Exercise, Manual, and Modality Logs for complete treatment.       Assessment/Plan : Pt reporting worsening pain after dry needling and will be seeking further medical treatments. She confirms no adverse symptoms immediately, but later started having worsening symptoms post needling.              Timed:  Manual Therapy:    0     mins  46452;  Therapeutic Exercise:    15     mins  63643;     Neuromuscular Rock:    0    mins  13146;    Therapeutic Activity:     0     mins  00883;     Gait Trainin     mins  88778;     Ultrasound:     0     mins  83938;    Aquatic Therapy    0     mins  19379;    Untimed:  Electrical Stimulation:    0     mins  55641 ( );  Dry Needling     0     mins self-pay;  Mechanical Traction    0     mins  03567  Moist Heat     0     mins  No charge  Canalith Repos              0     mins 40514    Timed Treatment:   15   mins   Total Treatment:     15   mins    Onofre De Jesus PT  Physical Therapist    Electronically signed 2023    [place] License: PT - [id number]

## 2023-06-12 ENCOUNTER — TRANSCRIBE ORDERS (OUTPATIENT)
Dept: ADMINISTRATIVE | Facility: HOSPITAL | Age: 73
End: 2023-06-12
Payer: MEDICARE

## 2023-06-12 DIAGNOSIS — M81.0 SENILE OSTEOPOROSIS: Primary | ICD-10-CM

## 2023-06-30 PROBLEM — R06.02 SHORTNESS OF BREATH: Status: RESOLVED | Noted: 2023-02-16 | Resolved: 2023-06-30

## 2023-06-30 PROBLEM — E11.42 DIABETIC PERIPHERAL NEUROPATHY: Status: ACTIVE | Noted: 2023-06-30

## 2023-06-30 PROBLEM — I44.2 CHB (COMPLETE HEART BLOCK): Status: RESOLVED | Noted: 2022-08-03 | Resolved: 2023-06-30

## 2023-07-31 ENCOUNTER — TELEPHONE (OUTPATIENT)
Dept: VASCULAR SURGERY | Facility: HOSPITAL | Age: 73
End: 2023-07-31

## 2023-07-31 NOTE — TELEPHONE ENCOUNTER
Caller: Deisy Lama    Relationship: Self    Best call back number:     [phone]       What form or medical record are you requesting: SURGICAL CLEARANCE     Who is requesting this form or medical record from you: MARYSOL     How would you like to receive the form or medical records (pick-up, mail, fax):     [phone]  PHONE [phone]  EXTENSION [id number]    Timeframe paperwork needed: ASAP     Additional notes: PATIENT WOULD LIKE A CALL BACK TO CONFIRM

## 2023-08-02 ENCOUNTER — DOCUMENTATION (OUTPATIENT)
Dept: PHYSICAL THERAPY | Facility: CLINIC | Age: 73
End: 2023-08-02
Payer: MEDICARE

## 2023-09-18 ENCOUNTER — TRANSCRIBE ORDERS (OUTPATIENT)
Dept: ADMINISTRATIVE | Facility: HOSPITAL | Age: 73
End: 2023-09-18
Payer: MEDICARE

## 2023-09-18 DIAGNOSIS — M86.9 OSTEOMYELITIS, UNSPECIFIED SITE, UNSPECIFIED TYPE: Primary | ICD-10-CM

## 2023-10-28 ENCOUNTER — HOSPITAL ENCOUNTER (EMERGENCY)
Facility: HOSPITAL | Age: 73
Discharge: HOME | End: 2023-10-28
Attending: EMERGENCY MEDICINE
Payer: MEDICARE

## 2023-10-28 ENCOUNTER — APPOINTMENT (OUTPATIENT)
Dept: GENERAL RADIOLOGY | Facility: HOSPITAL | Age: 73
End: 2023-10-28
Payer: MEDICARE

## 2023-10-28 VITALS
SYSTOLIC BLOOD PRESSURE: 146 MMHG | WEIGHT: 176 LBS | RESPIRATION RATE: 18 BRPM | TEMPERATURE: 97.8 F | OXYGEN SATURATION: 95 % | BODY MASS INDEX: 29.32 KG/M2 | HEIGHT: 65 IN | DIASTOLIC BLOOD PRESSURE: 71 MMHG | HEART RATE: 62 BPM

## 2023-10-28 DIAGNOSIS — N28.9 RENAL INSUFFICIENCY: ICD-10-CM

## 2023-10-28 DIAGNOSIS — R60.9 PERIPHERAL EDEMA: ICD-10-CM

## 2023-10-28 DIAGNOSIS — Z45.018 ENCOUNTER FOR INTERROGATION OF CARDIAC PACEMAKER: Primary | ICD-10-CM

## 2023-10-28 LAB
ALBUMIN SERPL-MCNC: 3.4 G/DL (ref 3.5–5.2)
ALBUMIN/GLOB SERPL: 1 G/DL
ALP SERPL-CCNC: 147 U/L (ref 39–117)
ALT SERPL W P-5'-P-CCNC: 22 U/L (ref 1–33)
ANION GAP SERPL CALCULATED.3IONS-SCNC: 10.4 MMOL/L (ref 5–15)
AST SERPL-CCNC: 23 U/L (ref 1–32)
BASOPHILS # BLD AUTO: 0.11 10*3/MM3 (ref 0–0.2)
BASOPHILS NFR BLD AUTO: 1 % (ref 0–1.5)
BILIRUB SERPL-MCNC: 0.2 MG/DL (ref 0–1.2)
BUN SERPL-MCNC: 51 MG/DL (ref 8–23)
BUN/CREAT SERPL: 31.5 (ref 7–25)
CALCIUM SPEC-SCNC: 9.3 MG/DL (ref 8.6–10.5)
CHLORIDE SERPL-SCNC: 107 MMOL/L (ref 98–107)
CO2 SERPL-SCNC: 26.6 MMOL/L (ref 22–29)
CREAT SERPL-MCNC: 1.62 MG/DL (ref 0.57–1)
DEPRECATED RDW RBC AUTO: 46.2 FL (ref 37–54)
EGFRCR SERPLBLD CKD-EPI 2021: 33 ML/MIN/1.73
EOSINOPHIL # BLD AUTO: 0.68 10*3/MM3 (ref 0–0.4)
EOSINOPHIL NFR BLD AUTO: 5.9 % (ref 0.3–6.2)
ERYTHROCYTE [DISTWIDTH] IN BLOOD BY AUTOMATED COUNT: 13.2 % (ref 12.3–15.4)
GLOBULIN UR ELPH-MCNC: 3.5 GM/DL
GLUCOSE SERPL-MCNC: 124 MG/DL (ref 65–99)
HCT VFR BLD AUTO: 34.9 % (ref 34–46.6)
HGB BLD-MCNC: 10.8 G/DL (ref 12–15.9)
HOLD SPECIMEN: NORMAL
HOLD SPECIMEN: NORMAL
IMM GRANULOCYTES # BLD AUTO: 0.04 10*3/MM3 (ref 0–0.05)
IMM GRANULOCYTES NFR BLD AUTO: 0.3 % (ref 0–0.5)
LYMPHOCYTES # BLD AUTO: 2.82 10*3/MM3 (ref 0.7–3.1)
LYMPHOCYTES NFR BLD AUTO: 24.4 % (ref 19.6–45.3)
MAGNESIUM SERPL-MCNC: 2.2 MG/DL (ref 1.6–2.4)
MCH RBC QN AUTO: 29.7 PG (ref 26.6–33)
MCHC RBC AUTO-ENTMCNC: 30.9 G/DL (ref 31.5–35.7)
MCV RBC AUTO: 95.9 FL (ref 79–97)
MONOCYTES # BLD AUTO: 1.32 10*3/MM3 (ref 0.1–0.9)
MONOCYTES NFR BLD AUTO: 11.4 % (ref 5–12)
NEUTROPHILS NFR BLD AUTO: 57 % (ref 42.7–76)
NEUTROPHILS NFR BLD AUTO: 6.6 10*3/MM3 (ref 1.7–7)
NRBC BLD AUTO-RTO: 0 /100 WBC (ref 0–0.2)
NT-PROBNP SERPL-MCNC: 1452 PG/ML (ref 0–1800)
PLATELET # BLD AUTO: 562 10*3/MM3 (ref 140–450)
PMV BLD AUTO: 9.7 FL (ref 6–12)
POTASSIUM SERPL-SCNC: 3.9 MMOL/L (ref 3.5–5.2)
PROT SERPL-MCNC: 6.9 G/DL (ref 6–8.5)
RBC # BLD AUTO: 3.64 10*6/MM3 (ref 3.77–5.28)
SODIUM SERPL-SCNC: 144 MMOL/L (ref 136–145)
TROPONIN T SERPL HS-MCNC: 39 NG/L
WBC NRBC COR # BLD: 11.57 10*3/MM3 (ref 3.4–10.8)
WHOLE BLOOD HOLD COAG: NORMAL
WHOLE BLOOD HOLD SPECIMEN: NORMAL

## 2023-10-28 PROCEDURE — 93005 ELECTROCARDIOGRAM TRACING: CPT

## 2023-10-28 PROCEDURE — 80053 COMPREHEN METABOLIC PANEL: CPT | Performed by: EMERGENCY MEDICINE

## 2023-10-28 PROCEDURE — 83735 ASSAY OF MAGNESIUM: CPT | Performed by: EMERGENCY MEDICINE

## 2023-10-28 PROCEDURE — 85025 COMPLETE CBC W/AUTO DIFF WBC: CPT | Performed by: EMERGENCY MEDICINE

## 2023-10-28 PROCEDURE — 99284 EMERGENCY DEPT VISIT MOD MDM: CPT

## 2023-10-28 PROCEDURE — 83880 ASSAY OF NATRIURETIC PEPTIDE: CPT | Performed by: EMERGENCY MEDICINE

## 2023-10-28 PROCEDURE — 93010 ELECTROCARDIOGRAM REPORT: CPT | Performed by: INTERNAL MEDICINE

## 2023-10-28 PROCEDURE — 36415 COLL VENOUS BLD VENIPUNCTURE: CPT | Performed by: EMERGENCY MEDICINE

## 2023-10-28 PROCEDURE — 71045 X-RAY EXAM CHEST 1 VIEW: CPT

## 2023-10-28 PROCEDURE — 84484 ASSAY OF TROPONIN QUANT: CPT | Performed by: EMERGENCY MEDICINE

## 2023-10-28 PROCEDURE — 93005 ELECTROCARDIOGRAM TRACING: CPT | Performed by: EMERGENCY MEDICINE

## 2023-10-28 RX ORDER — SODIUM CHLORIDE 0.9 % (FLUSH) 0.9 %
10 SYRINGE (ML) INJECTION AS NEEDED
Status: DISCONTINUED | OUTPATIENT
Start: 2023-10-28 | End: 2023-10-28 | Stop reason: HOSPADM

## 2023-10-28 RX ORDER — ESCITALOPRAM OXALATE 10 MG/1
10 TABLET ORAL DAILY
COMMUNITY

## 2023-10-28 RX ORDER — BUMETANIDE 1 MG/1
1 TABLET ORAL DAILY
COMMUNITY

## 2023-10-28 NOTE — ED NOTES
"Verito from [employer] called RN in regards to recent pacemaker/defib interrogation results. Verito stated that the pacemaker is \"at recommended change interval\" and that \"the patient is in no harm the pacemaker is working as it should. Just have the pt call her cardiologist and schedule an appointment.\"  "

## 2023-10-28 NOTE — DISCHARGE INSTRUCTIONS
Please call Dr. Bernal on Monday to schedule a battery replacement for your pacemaker in the next week, per the instructions of     Please increase your Bumex from once a day to twice a day for the next 3 days.  Please elevate your legs above your heart while at rest.  Please purchase and wear compression stockings while awake    Please have your primary care provider recheck your renal function or BUN, creatinine and GFR in the next week    Please return to the emergency department for chest pain, chest pressure, shortness of breath, near passing out, passing out, unusual fatigue, usual sweating or any new symptoms that you are concerned with

## 2023-10-28 NOTE — ED PROVIDER NOTES
Time: 5:14 PM EDT  Date of encounter:  10/28/2023  Independent Historian/Clinical History and Information was obtained by:   Patient    History is limited by: N/A    Chief Complaint   Patient presents with    Pacemaker Problem         History of Present Illness:  Patient is a 73 y.o. year old female who presents to the emergency department for evaluation of pacemaker issues.  The patient notes that she heard sirens coming off from her pacemaker last night while driving in the car.  She states it lasted only for a second and then went away.  The patient states that she heard sirens again coming from her pacemaker this morning.  She did see her cardiologist in August 2 told her that the pacemaker battery need to be replaced in November or December.  The patient denies any chest pain, chest pressure or shortness of breath.  The patient's had no palpitations.  The patient denies any shortness of breath.  The patient denies unusual fatigue, near-syncope or syncope.  The patient's had no unusual diaphoresis.  Patient does note over the last couple weeks that she has had increasing peripheral edema in her legs.  The patient does note that she takes Bumex once a day.  She states that her primary care provider did put her on spironolactone however she does not feel there is any substantial change.      Patient Care Team  Primary Care Provider: Aayush Priest MD    Past Medical History:     Allergies   Allergen Reactions    Morphine Itching    Dilaudid [Hydromorphone] Confusion    Lisinopril Cough     Past Medical History:   Diagnosis Date    AAA (abdominal aortic aneurysm)     Pt reported/BEING MONITORED    Acid reflux     Arthritis     Blood disease     ABNORMAL PLATELET COUNT/CHRONIC ELEVATED WBC,NO LEUKEMIA    Congestive heart failure (CHF)     MANAGED W/DIURETIC, NO RECENT PROBLEMS    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    Diabetes mellitus     ORAL MED    Hiatal hernia     History of transfusion     LAST IN '90'S     Hyperlipidemia LDL goal <100 08/23/2021    Hypertension     Kidney stone     NO CURRENT ISSUES    Presence of biventricular implantable cardioverter-defibrillator (ICD) 08/23/2021    Sciatica     LEFT    Sleep apnea     Thyroid disorder     hypo     Past Surgical History:   Procedure Laterality Date    APPENDECTOMY      BLADDER SURGERY      REPAIR    CARDIAC DEFIBRILLATOR PLACEMENT      CATARACT EXTRACTION Bilateral     WITH LENS IMPLANT    CHOLECYSTECTOMY      COLONOSCOPY      GASTROPLASTY      FOR WEIGHT LOSS    HEEL SPUR SURGERY      HERNIA REPAIR      ABDOMINAL    HYSTERECTOMY      INGUINAL HERNIA REPAIR Bilateral 8/31/2022    Procedure: INGUINAL HERNIA REPAIR LAPAROSCOPIC WITH DAVINCI ROBOT, lysis of adhesions;  Surgeon: Lauri Celis MD;  [place] [facility] [place] [facility] [place]  Service: Robotics - DaVinci;  Laterality: Bilateral;    KIDNEY STONE SURGERY      LUMBAR DISCECTOMY Left 1/27/2023    Procedure: MINIMALLY INVASIVE LUMBAR LATERAL RECESS DECOMPRESSION AND FORAMINOTOMY, LEFT APPROACH, LUMBAR 2-LUMBAR 3;  Surgeon: Ralf Rodriguez MD;  Location: [facility] OR;  Service: Neurosurgery;  Laterality: Left;    PACEMAKER IMPLANTATION      SPLENECTOMY      TRAUMATIC INJURY R/T GASTROPLASTY SURGERY    TONSILLECTOMY       Family History   Problem Relation Age of Onset    Stroke Mother     Diabetes Mother     Arthritis Mother     Osteoporosis Mother     Heart disease Father     Cancer Father         PROSTATE    Arthritis Father     Osteoporosis Father     Diabetes Brother     Cancer Son     Malig Hyperthermia Neg Hx        Home Medications:  Prior to Admission medications    Medication Sig Start Date End Date Taking? Authorizing Provider   Accu-Chek Evelina Plus test strip 1 each by Other route Daily. 1/28/22   Polina Falk MD   atorvastatin (LIPITOR) 10 MG tablet Take 1 tablet by mouth Every Night. 7/20/21   Polina Falk MD   brimonidine (ALPHAGAN) 0.2 % ophthalmic solution Administer 1 drop to both eyes 2  (Two) Times a Day. 11/1/21   Polina Falk MD   diclofenac (VOLTAREN) 75 MG EC tablet Take 1 tablet by mouth 2 (Two) Times a Day. LAST DOSE 1/19/23 8/6/21   Polina Falk MD   Diclofenac Sodium (VOLTAREN) 1 % gel gel Apply 4 g topically to the appropriate area as directed 4 (Four) Times a Day. 6/27/23   Ewa Rodriguez MD   dorzolamide (TRUSOPT) 2 % ophthalmic solution Administer 1 drop to both eyes 2 (Two) Times a Day. 11/12/21   Polina Falk MD   furosemide (LASIX) 80 MG tablet Take 1 tablet by mouth Daily.  Patient taking differently: Take 1 tablet by mouth Every Other Day. INST PER ANESTHESIA PROTOCOL 8/26/21   Michael Bernal MD   glimepiride (AMARYL) 2 MG tablet Take 1 tablet by mouth Every Morning Before Breakfast. INST PER ANESTHESIA PROTOCOL 10/8/21   Polina Falk MD   hydroCHLOROthiazide (HYDRODIURIL) 25 MG tablet Take 1 tablet by mouth Daily. INST PER ANESTHESIA PROTOCOL 5/3/21   Polina Falk MD   levothyroxine (SYNTHROID, LEVOTHROID) 112 MCG tablet Take 1 tablet by mouth Daily. 5/3/21   Polina Falk MD   losartan (COZAAR) 100 MG tablet Take 1 tablet by mouth Daily. INST PER ANESTHESIA PROTOCOL 7/14/21   Polina Falk MD   metoprolol succinate XL (TOPROL-XL) 50 MG 24 hr tablet Take 1 tablet by mouth 2 (two) times a day. INST PER ANESTHESIA PROTOCOL    Polina Falk MD   omeprazole (priLOSEC) 40 MG capsule omeprazole 40 mg capsule,delayed release 5/3/21   Polina Falk MD   rOPINIRole (REQUIP) 4 MG tablet Take 1 tablet by mouth Every Morning. Pt notes she takes one tablet in the morning and 3 tablets at bedtime. 11/4/21   Polina Falk MD   rOPINIRole (REQUIP) 4 MG tablet Take 3 tablets by mouth Every Night.    Polina Falk MD   sertraline (ZOLOFT) 100 MG tablet Take 1 tablet by mouth Daily Before Supper. 7/6/21   Polina Falk MD        Social History:   Social History     Tobacco Use    Smoking status:  "Never    Smokeless tobacco: Never   Vaping Use    Vaping Use: Never used   Substance Use Topics    Alcohol use: Never    Drug use: Never         Review of Systems:  Review of Systems   Constitutional:  Negative for chills, diaphoresis and fever.   HENT:  Negative for congestion, postnasal drip, rhinorrhea and sore throat.    Eyes:  Negative for photophobia.   Respiratory:  Negative for cough, chest tightness and shortness of breath.    Cardiovascular:  Positive for leg swelling. Negative for chest pain and palpitations.   Gastrointestinal:  Negative for abdominal pain, diarrhea, nausea and vomiting.   Genitourinary:  Negative for difficulty urinating, dysuria, flank pain, frequency, hematuria and urgency.   Musculoskeletal:  Negative for neck pain and neck stiffness.   Skin:  Negative for pallor and rash.   Neurological:  Negative for dizziness, syncope, weakness, numbness and headaches.   Hematological:  Negative for adenopathy. Does not bruise/bleed easily.   Psychiatric/Behavioral: Negative.          Physical Exam:  /71 (BP Location: Right arm, Patient Position: Sitting)   Pulse 62   Temp 97.8 °F (36.6 °C) (Oral)   Resp 18   Ht 165.1 cm (65\")   Wt 79.8 kg (176 lb)   SpO2 95%   BMI 29.29 kg/m²         Physical Exam  Vitals and nursing note reviewed.   Constitutional:       General: She is not in acute distress.     Appearance: Normal appearance. She is not ill-appearing, toxic-appearing or diaphoretic.   HENT:      Head: Normocephalic and atraumatic.      Mouth/Throat:      Mouth: Mucous membranes are moist.   Eyes:      Pupils: Pupils are equal, round, and reactive to light.   Cardiovascular:      Rate and Rhythm: Normal rate and regular rhythm.      Pulses: Normal pulses.           Carotid pulses are 2+ on the right side and 2+ on the left side.       Radial pulses are 2+ on the right side and 2+ on the left side.        Femoral pulses are 2+ on the right side and 2+ on the left side.       " Popliteal pulses are 2+ on the right side and 2+ on the left side.        Dorsalis pedis pulses are 2+ on the right side and 2+ on the left side.        Posterior tibial pulses are 2+ on the right side and 2+ on the left side.      Heart sounds: Normal heart sounds. No murmur heard.     Comments: There appears to be a cardiac pacemaker present located in the left anterior superior chest wall  Pulmonary:      Effort: Pulmonary effort is normal. No accessory muscle usage, respiratory distress or retractions.      Breath sounds: Normal breath sounds. No wheezing, rhonchi or rales.   Abdominal:      General: Abdomen is flat. There is no distension.      Palpations: Abdomen is soft. There is no mass or pulsatile mass.      Tenderness: There is no abdominal tenderness. There is no right CVA tenderness, left CVA tenderness, guarding or rebound.      Comments: No rigidity   Musculoskeletal:         General: No swelling, tenderness or deformity.      Cervical back: Neck supple. No tenderness.      Right lower leg: Edema present.      Left lower leg: Edema present.   Skin:     General: Skin is warm and dry.      Capillary Refill: Capillary refill takes less than 2 seconds.      Coloration: Skin is not jaundiced or pale.      Findings: No erythema.   Neurological:      General: No focal deficit present.      Mental Status: She is alert and oriented to person, place, and time. Mental status is at baseline.      Cranial Nerves: Cranial nerves 2-12 are intact. No cranial nerve deficit.      Sensory: Sensation is intact. No sensory deficit.      Motor: Motor function is intact. No weakness or pronator drift.      Coordination: Coordination is intact. Coordination normal.   Psychiatric:         Mood and Affect: Mood normal.         Behavior: Behavior normal.                  Procedures:  Procedures      Medical Decision Making:      Comorbidities that affect care:    Arthritis, kidney stone, hypothyroid, abdominal aortic aneurysm,  diabetes, congestive heart failure, hypertension, atrial fibrillation, hyperlipidemia    External Notes reviewed:    None      The following orders were placed and all results were independently analyzed by me:  Orders Placed This Encounter   Procedures    XR Chest 1 View    [place] Draw    Comprehensive Metabolic Panel    BNP    Single High Sensitivity Troponin T    CBC Auto Differential    Magnesium    Continuous Pulse Oximetry    Vital Signs    ECG 12 Lead Other; AICD GOING OFF    CBC & Differential    Green Top (Gel)    Lavender Top    Gold Top - SST    Light Blue Top       Medications Given in the Emergency Department:  Medications - No data to display       ED Course:    The patient was initially evaluated in the triage area where orders were placed. The patient was later dispositioned by Kash Rojas DO.      The patient was advised to stay for completion of workup which includes but is not limited to communication of labs and radiological results, reassessment and plan. The patient was advised that leaving prior to disposition by a provider could result in critical findings that are not communicated to the patient.     ED Course as of 10/30/23 0153   Sat Oct 28, 2023   1714 --- PROVIDER IN TRIAGE NOTE ---    The patient was evaluated by Avery walls in triage. Orders were placed and the patient is currently awaiting disposition.    [AJ]   1824 EKG:    Rhythm: Atrial sensed ventricular paced rhythm  Rate: 63  Intervals: Normal QT interval  T-wave: T Wave inversion V2, aVL  ST Segment: No pathological ST segment    EKG Comparison: No change from the previous EKG performed January 27, 2023  Interpreted by me   [SD]      ED Course User Index  [AJ] Avery Bran PA-C  [SD] Kash Rojas DO       Labs:    Lab Results (last 24 hours)       ** No results found for the last 24 hours. **             Imaging:    No Radiology Exams Resulted Within Past 24 Hours      Differential Diagnosis and  Discussion:      Edema: Based on the patient's history, signs, and symptoms, the differential diagnosis includes but is not limited to heart failure, kidney disease, liver disease, venous insufficiency, lymphedema, pregnancy, medications, allergic reactions.    All labs were reviewed and interpreted by me.  All X-rays impressions were independently interpreted by me.  EKG was interpreted by me.    MDM  Number of Diagnoses or Management Options  Encounter for interrogation of cardiac pacemaker  Peripheral edema  Renal insufficiency  Diagnosis management comments: The patient's CBC was reviewed and shows no abnormalities of critical concern.  Of note, there is no anemia requiring a blood transfusion and the platelet count is acceptable    The patient's CMP was reviewed and shows no abnormalities of critical concern.  Of note, the patient's sodium and potassium are acceptable.  The patient's liver enzymes are unremarkable.  The patient's renal function including creatinine is preserved.  The patient has a normal anion gap.    Patient's magnesium level was normal.    Patient's high since troponin was slightly elevated at 39.  I do feel that this is elevated due to the patient's renal insufficiency in light of the fact that the patient had no chest pain and no other cardiac equivalent symptoms.    The patient's EKG demonstrated atrial sensed ventricular paced rhythm with no other acute abnormalities      Verito from [employer] interrogated the patient's [employer].  The pacemaker alerted due to the low battery.  She states the patient needs to have the battery replaced within the next 1 to 2 weeks.  She states this can be managed on an outpatient basis.      At the time of discharge, the patient appeared well, no distress and nontoxic.  The patient was resting very comfortably.  The patient will call her cardiologist on Monday to schedule battery replacement.  I will increase the patient's Bumex for the next 3 days.  We  will increase her Bumex from once a day to twice a day.  The patient will elevate her legs while at rest.  The patient will purchase and wear compression stockings while awake.  The patient will also follow-up with her primary care provider to recheck her renal function this week.    The patient was given very specific instructions on when and why to return to the emergency room.  The patient voiced understanding and felt comfortable with the discharge instructions.  They would return to the emergency room if necessary.  The patient appears appropriate for discharge and outpatient follow-up.         Amount and/or Complexity of Data Reviewed  Clinical lab tests: reviewed  Tests in the radiology section of CPT®: reviewed  Tests in the medicine section of CPT®: reviewed  Decide to obtain previous medical records or to obtain history from someone other than the patient: yes  Discuss the patient with other providers: yes (18:57 EDT  I discussed the case with Dr. Nugent.  We reviewed the patient's EKG, laboratory values and the [employer] interrogation.  He agrees with Verito from [employer] that the patient can go home and will need to schedule a battery replacement within the next 1 to 2 weeks.  I discussed with the patient who verbalized her standing.  She will call the office on Monday to schedule battery replacement.  Dr. Nugent will also call the office on Monday to make them aware of the situation)         Social Determinants of Health:    Patient is independent, reliable, and has access to care.       Disposition and Care Coordination:    Discharged: The patient is suitable and stable for discharge with no need for consideration of observation or admission.    I have explained discharge medications and the need for follow up with the patient/caretakers. This was also printed in the discharge instructions. Patient was discharged with the following medications and follow up:      Medication List      No changes  were made to your prescriptions during this visit.      Aayush Priest MD  [address]  [place] [address]  [phone]    On 10/31/2023  Peripheral edema and renal insufficiency, call for appointment    Michael Bernal MD  [address] [place] [address]  [phone]    On 10/30/2023  Low battery life in your pacemaker, call for appointment       Final diagnoses:   Encounter for interrogation of cardiac pacemaker   Peripheral edema   Renal insufficiency        ED Disposition       ED Disposition   Discharge    Condition   Stable    Comment   --               This medical record created using voice recognition software.             Kash Rojas DO  10/30/23 0153

## 2023-10-29 LAB
QT INTERVAL: 468 MS
QTC INTERVAL: 480 MS

## 2023-10-30 ENCOUNTER — TELEPHONE (OUTPATIENT)
Dept: CARDIOLOGY | Facility: CLINIC | Age: 73
End: 2023-10-30
Payer: MEDICARE

## 2023-10-30 NOTE — TELEPHONE ENCOUNTER
Caller: Deisy Lama    Relationship to patient: Self    Best call back number: [phone]     Chief complaint: PACEMAKER BATTERY    Type of visit: DEVICE/FU    Requested date: ASAP     If rescheduling, when is the original appointment: JANUARY 2024     Additional notes: PT STATES THAT HER PACEMAKER HAS BEEN GOING OFF FOR A FEW DAYS AND PT WENT TO ED ON SATURDAY AND WAS TOLD TO FOLLOW UP WITH CARDIOLOGIST AS PACEMAKER BATTERY WOULD NEED TO BE CHANGED WITHIN A WEEK - PT DID HAVE BACK SURGERY AND CURRENTLY HAS A SMALL INFECTION IN INCISION, PT WANTED TO LET OFFICE KNOW IN CASE IT AFFECTS BATTERY CHANGE  - PT IS GOING TODAY 10.30.23 TO SEE SURGEON TO HAVE IT LOOKED AT AND HAS BEEN ON ANTIBIOTICS

## 2023-10-31 ENCOUNTER — TELEPHONE (OUTPATIENT)
Dept: CARDIOLOGY | Facility: CLINIC | Age: 73
End: 2023-10-31
Payer: MEDICARE

## 2023-10-31 NOTE — TELEPHONE ENCOUNTER
Patient has a Medtronic BI-V ICD device and she has reached RAVINDRA as of 10/27/2023.  She is Pacemaker DEPENDENT, however we do have time remaining and can continue to monitor, see note below.     Patient stated that she had back surgery at [facility] around September 1st 2023 and has been dealing with a wound infection.  She stated that the incision is getting wider and deeper and has home health coming to see her.    She is able to walk and get out of the house.  I stated that she may need to come in to be seen by Cardiologist that would eventually do the Bi-V ICD battery change to evaluate her incision and the current infection that she is trying to heal from.    I will discuss with Dr. Bernal and we will let her know how we will proceed.

## 2023-11-09 ENCOUNTER — PREP FOR SURGERY (OUTPATIENT)
Dept: OTHER | Facility: HOSPITAL | Age: 73
End: 2023-11-09
Payer: MEDICARE

## 2023-11-09 ENCOUNTER — OFFICE VISIT (OUTPATIENT)
Dept: CARDIOLOGY | Facility: CLINIC | Age: 73
End: 2023-11-09
Payer: MEDICARE

## 2023-11-09 VITALS
HEIGHT: 65 IN | SYSTOLIC BLOOD PRESSURE: 118 MMHG | WEIGHT: 176.2 LBS | BODY MASS INDEX: 29.36 KG/M2 | DIASTOLIC BLOOD PRESSURE: 50 MMHG | HEART RATE: 63 BPM

## 2023-11-09 DIAGNOSIS — E78.5 HYPERLIPIDEMIA LDL GOAL <100: ICD-10-CM

## 2023-11-09 DIAGNOSIS — I10 ESSENTIAL HYPERTENSION: ICD-10-CM

## 2023-11-09 DIAGNOSIS — Z95.810 PRESENCE OF BIVENTRICULAR IMPLANTABLE CARDIOVERTER-DEFIBRILLATOR (ICD): ICD-10-CM

## 2023-11-09 DIAGNOSIS — I50.22 CHRONIC HFREF (HEART FAILURE WITH REDUCED EJECTION FRACTION): Primary | ICD-10-CM

## 2023-11-09 DIAGNOSIS — I50.42 CHRONIC COMBINED SYSTOLIC AND DIASTOLIC CHF (CONGESTIVE HEART FAILURE): ICD-10-CM

## 2023-11-09 DIAGNOSIS — Z95.810 PRESENCE OF BIVENTRICULAR IMPLANTABLE CARDIOVERTER-DEFIBRILLATOR (ICD): Primary | ICD-10-CM

## 2023-11-09 RX ORDER — SODIUM CHLORIDE 0.9 % (FLUSH) 0.9 %
10 SYRINGE (ML) INJECTION EVERY 12 HOURS SCHEDULED
OUTPATIENT
Start: 2023-11-09

## 2023-11-09 RX ORDER — SODIUM CHLORIDE 9 MG/ML
40 INJECTION, SOLUTION INTRAVENOUS AS NEEDED
OUTPATIENT
Start: 2023-11-09

## 2023-11-09 RX ORDER — FUROSEMIDE 40 MG/1
40 TABLET ORAL 2 TIMES DAILY
COMMUNITY

## 2023-11-09 RX ORDER — SPIRONOLACTONE 50 MG/1
50 TABLET, FILM COATED ORAL DAILY
COMMUNITY

## 2023-11-09 RX ORDER — SODIUM CHLORIDE 0.9 % (FLUSH) 0.9 %
10 SYRINGE (ML) INJECTION AS NEEDED
OUTPATIENT
Start: 2023-11-09

## 2023-11-09 RX ORDER — LATANOPROST 50 UG/ML
1 SOLUTION/ DROPS OPHTHALMIC NIGHTLY
COMMUNITY

## 2023-11-09 RX ORDER — SULFAMETHOXAZOLE AND TRIMETHOPRIM 800; 160 MG/1; MG/1
1 TABLET ORAL 2 TIMES DAILY
COMMUNITY

## 2023-11-09 NOTE — PROGRESS NOTES
Chief Complaint  Congestive Heart Failure    Subjective    Patient recently a few weeks ago with increased lower extremity with shortness of breath this is being because she had been out of her Lasix which has not been restarted and has some improvement with the symptoms since then.  She also does report a beeping sound which is coming from her pacemaker that started a few days ago.      Past Medical History:   Diagnosis Date    AAA (abdominal aortic aneurysm)     Pt reported/BEING MONITORED    Acid reflux     Arthritis     Blood disease     ABNORMAL PLATELET COUNT/CHRONIC ELEVATED WBC,NO LEUKEMIA    CHB (complete heart block)     Congestive heart failure (CHF)     MANAGED W/DIURETIC, NO RECENT PROBLEMS    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    Diabetes mellitus     ORAL MED    Hiatal hernia     History of transfusion     LAST IN '90'S    Hyperlipidemia LDL goal <100 08/23/2021    Hypertension     Kidney stone     NO CURRENT ISSUES    Presence of biventricular implantable cardioverter-defibrillator (ICD) 08/23/2021    Sciatica     LEFT    Sleep apnea     Thyroid disorder     hypo         Current Outpatient Medications:     Accu-Chek Evelina Plus test strip, 1 each by Other route Daily., Disp: , Rfl:     atorvastatin (LIPITOR) 10 MG tablet, Take 1 tablet by mouth Every Night., Disp: , Rfl:     brimonidine (ALPHAGAN) 0.2 % ophthalmic solution, Administer 1 drop to both eyes 2 (Two) Times a Day., Disp: , Rfl:     bumetanide (BUMEX) 1 MG tablet, Take 1 tablet by mouth Daily., Disp: , Rfl:     Diclofenac Sodium (VOLTAREN) 1 % gel gel, Apply 4 g topically to the appropriate area as directed 4 (Four) Times a Day., Disp: 100 g, Rfl: 2    dorzolamide (TRUSOPT) 2 % ophthalmic solution, Administer 1 drop to both eyes 2 (Two) Times a Day., Disp: , Rfl:     escitalopram (LEXAPRO) 10 MG tablet, Take 1 tablet by mouth Daily., Disp: , Rfl:     furosemide (LASIX) 40 MG tablet, Take 1 tablet by mouth 2 (Two) Times a Day., Disp: , Rfl:      glimepiride (AMARYL) 2 MG tablet, Take 1 tablet by mouth Every Morning Before Breakfast. INST PER ANESTHESIA PROTOCOL, Disp: , Rfl:     hydroCHLOROthiazide (HYDRODIURIL) 25 MG tablet, Take 1 tablet by mouth Daily. INST PER ANESTHESIA PROTOCOL, Disp: , Rfl:     latanoprost (XALATAN) 0.005 % ophthalmic solution, 1 drop Every Night., Disp: , Rfl:     levothyroxine (SYNTHROID, LEVOTHROID) 112 MCG tablet, Take 125 mcg by mouth Daily., Disp: , Rfl:     losartan (COZAAR) 100 MG tablet, Take 1 tablet by mouth Daily. INST PER ANESTHESIA PROTOCOL, Disp: , Rfl:     metoprolol succinate XL (TOPROL-XL) 50 MG 24 hr tablet, Take 1 tablet by mouth 2 (two) times a day. INST PER ANESTHESIA PROTOCOL, Disp: , Rfl:     mupirocin (BACTROBAN) 2 % ointment, Apply 1 application  topically to the appropriate area as directed 3 (Three) Times a Day., Disp: , Rfl:     omeprazole (priLOSEC) 40 MG capsule, omeprazole 40 mg capsule,delayed release, Disp: , Rfl:     rOPINIRole (REQUIP) 4 MG tablet, Take 1 tablet by mouth Every Morning. Pt notes she takes one tablet in the morning and 3 tablets at bedtime., Disp: , Rfl:     rOPINIRole (REQUIP) 4 MG tablet, Take 3 tablets by mouth Every Night., Disp: , Rfl:     spironolactone (ALDACTONE) 50 MG tablet, Take 1 tablet by mouth Daily., Disp: , Rfl:     sulfamethoxazole-trimethoprim (BACTRIM DS,SEPTRA DS) 800-160 MG per tablet, Take 1 tablet by mouth 2 (Two) Times a Day. Taking for 10 days, Disp: , Rfl:     diclofenac (VOLTAREN) 75 MG EC tablet, Take 1 tablet by mouth 2 (Two) Times a Day. LAST DOSE 1/19/23 (Patient not taking: Reported on 11/9/2023), Disp: , Rfl:     There are no discontinued medications.  Allergies   Allergen Reactions    Morphine Itching    Dilaudid [Hydromorphone] Confusion    Lisinopril Cough        Social History     Tobacco Use    Smoking status: Never    Smokeless tobacco: Never   Vaping Use    Vaping Use: Never used   Substance Use Topics    Alcohol use: Never    Drug use: Never  "      Family History   Problem Relation Age of Onset    Stroke Mother     Diabetes Mother     Arthritis Mother     Osteoporosis Mother     Heart disease Father     Cancer Father         PROSTATE    Arthritis Father     Osteoporosis Father     Diabetes Brother     Cancer Son     Malig Hyperthermia Neg Hx         Objective     /50 (BP Location: Left arm)   Pulse 63   Ht 165.1 cm (65\")   Wt 79.9 kg (176 lb 3.2 oz)   BMI 29.32 kg/m²       Physical Exam    General Appearance:   no acute distress  Alert and oriented x3  HENT:   lips not cyanotic  Atraumatic  Neck:  No jvd   supple  Respiratory:  no respiratory distress  normal breath sounds  no rales  Cardiovascular:  Regular rate and rhythm  no S3, no S4   no murmur  no rub  Extremities  No cyanosis  lower extremity edema: none    Skin:   warm, dry  No rashes      Result Review :     proBNP   Date Value Ref Range Status   10/28/2023 1,452.0 0.0 - 1,800.0 pg/mL Final     CMP          1/27/2023    07:23 9/1/2023    05:56 10/28/2023    17:21   CMP   Glucose 84   124    BUN 41   51    Creatinine 1.71   1.62    EGFR 31.1   33.0    Sodium 143   144    Potassium 4.2  4.1     3.9    Chloride 108   107    Calcium 9.8   9.3    Total Protein   6.9    Albumin   3.4    Globulin   3.5    Total Bilirubin   0.2    Alkaline Phosphatase   147    AST (SGOT)   23    ALT (SGPT)   22    Albumin/Globulin Ratio   1.0    BUN/Creatinine Ratio 24.0   31.5    Anion Gap 11.2   10.4       Details          This result is from an external source.             CBC w/diff          9/3/2023    04:52 9/4/2023    01:23 10/28/2023    17:21   CBC w/Diff   WBC   11.57    RBC   3.64    Hemoglobin 7.6     9.0     10.8    Hematocrit 24.2     27.8     34.9    MCV   95.9    MCH   29.7    MCHC   30.9    RDW   13.2    Platelets   562    Neutrophil Rel %   57.0    Immature Granulocyte Rel %   0.3    Lymphocyte Rel %   24.4    Monocyte Rel %   11.4    Eosinophil Rel %   5.9    Basophil Rel %   1.0       " "Details          This result is from an external source.              Lab Results   Component Value Date    TSH 3.660 07/16/2021      Lab Results   Component Value Date    FREET4 1.17 07/16/2021      No results found for: \"DDIMERQUANT\"  Magnesium   Date Value Ref Range Status   10/28/2023 2.2 1.6 - 2.4 mg/dL Final      No results found for: \"DIGOXIN\"   Lab Results   Component Value Date    TROPONINT 39 (H) 10/28/2023         BiV ICD interrogated by me today patient had reached RAVINDRA on October 27 otherwise the device was working normally    No results found for: \"POCTROP\"    Results for orders placed in visit on 08/31/21    Adult Transthoracic Echo Complete W/ Cont if Necessary Per Protocol    Interpretation Summary  · Calculated left ventricular EF = 64% Estimated left ventricular EF was in agreement with the calculated left ventricular EF.  · Left ventricular diastolic function is consistent with (grade Ia w/high LAP) impaired relaxation.                 Diagnoses and all orders for this visit:    1. Chronic HFrEF (heart failure with reduced ejection fraction) (Primary)  Assessment & Plan:  Patient with some increase edema may have been because she is out of Lasix now back on Lasix and Aldactone therapy improved symptomatically we will repeat echocardiogram last EF was normal    Orders:  -     Adult Transthoracic Echo Complete W/ Cont if Necessary Per Protocol; Future    2. Presence of biventricular implantable cardioverter-defibrillator (ICD)  Assessment & Plan:  Patient with device now reached RAVINDRA we will set her up for a battery change her left ventricular lead has been chronically elevated but stable and she did go over 6 years out of her battery life so feel reasonable just to go ahead with replacement of battery did go over risk benefits alternatives including the risk of infection bleeding and possible need for lead placement patient was agreeable proceeding      3. Hyperlipidemia LDL goal <100    4. " Essential hypertension            Follow Up     Return in about 6 months (around 5/9/2024).          Patient was given instructions and counseling regarding her condition or for health maintenance advice. Please see specific information pulled into the AVS if appropriate.

## 2023-11-09 NOTE — ASSESSMENT & PLAN NOTE
Patient with some increase edema may have been because she is out of Lasix now back on Lasix and Aldactone therapy improved symptomatically we will repeat echocardiogram last EF was normal

## 2023-11-09 NOTE — ASSESSMENT & PLAN NOTE
Patient with device now reached RAVINDRA we will set her up for a battery change her left ventricular lead has been chronically elevated but stable and she did go over 6 years out of her battery life so feel reasonable just to go ahead with replacement of battery did go over risk benefits alternatives including the risk of infection bleeding and possible need for lead placement patient was agreeable proceeding

## 2023-11-14 ENCOUNTER — CLINICAL SUPPORT NO REQUIREMENTS (OUTPATIENT)
Dept: CARDIOLOGY | Facility: CLINIC | Age: 73
End: 2023-11-14
Payer: MEDICARE

## 2023-11-14 DIAGNOSIS — I44.2 CHB (COMPLETE HEART BLOCK): Primary | ICD-10-CM

## 2023-11-14 DIAGNOSIS — Z95.810 PRESENCE OF BIVENTRICULAR IMPLANTABLE CARDIOVERTER-DEFIBRILLATOR (ICD): ICD-10-CM

## 2023-11-14 PROCEDURE — 93284 PRGRMG EVAL IMPLANTABLE DFB: CPT | Performed by: INTERNAL MEDICINE

## 2023-11-14 NOTE — PROGRESS NOTES
Normal BI-V ICD Device Interrogation and Device Testing.  Normal evaluation of device function and lead measurements.  No optimization was needed of parameters or maximization of device longevity.  Patient is on automated Home Remote Monitoring.     Patient has reached RAVINDRA and is on the schedule for a battery change, I disabled the alarm that beeps notifying the patient.  She is pacemaker dependent.

## 2023-11-15 ENCOUNTER — TELEPHONE (OUTPATIENT)
Dept: CARDIOLOGY | Facility: CLINIC | Age: 73
End: 2023-11-15
Payer: MEDICARE

## 2023-11-15 ENCOUNTER — OFFICE VISIT (OUTPATIENT)
Dept: WOUND CARE | Facility: HOSPITAL | Age: 73
End: 2023-11-15
Payer: MEDICARE

## 2023-11-15 VITALS
TEMPERATURE: 97.8 F | DIASTOLIC BLOOD PRESSURE: 80 MMHG | RESPIRATION RATE: 18 BRPM | HEART RATE: 68 BPM | SYSTOLIC BLOOD PRESSURE: 140 MMHG

## 2023-11-15 DIAGNOSIS — E11.628 CONTROLLED TYPE 2 DIABETES MELLITUS WITH OTHER SKIN COMPLICATION, WITHOUT LONG-TERM CURRENT USE OF INSULIN: ICD-10-CM

## 2023-11-15 DIAGNOSIS — T81.31XA DEHISCENCE OF OPERATIVE WOUND, INITIAL ENCOUNTER: Primary | ICD-10-CM

## 2023-11-15 DIAGNOSIS — Z98.1 HISTORY OF SPINAL FUSION: ICD-10-CM

## 2023-11-15 PROCEDURE — 3079F DIAST BP 80-89 MM HG: CPT | Performed by: EMERGENCY MEDICINE

## 2023-11-15 PROCEDURE — 1160F RVW MEDS BY RX/DR IN RCRD: CPT | Performed by: EMERGENCY MEDICINE

## 2023-11-15 PROCEDURE — 3077F SYST BP >= 140 MM HG: CPT | Performed by: EMERGENCY MEDICINE

## 2023-11-15 PROCEDURE — G0463 HOSPITAL OUTPT CLINIC VISIT: HCPCS | Performed by: EMERGENCY MEDICINE

## 2023-11-15 PROCEDURE — 1159F MED LIST DOCD IN RCRD: CPT | Performed by: EMERGENCY MEDICINE

## 2023-11-15 NOTE — PROGRESS NOTES
Chief Complaint  Wound Check (New patient here for wound to lower back.  Patient had surgery to back on 9/1/23 at [facility].  Patient was on antibiotics for wound until 10 days ago.  Wound was glued and sutured and some of wound .  Patient is diabetic. /BS 90 this am.)    Subjective      History of Present Illness    Deisy Lama  is a 73 y.o. female who underwent extensive spinal fusion surgery in early September 2023.  She has posterior rods with bilateral pedicle screws extending from T9-S1 as well as bilateral iliac screws.  Midline spinal wound had sutures and glue and majority of the wound healed well but there is an area of wound dehiscence and the patient's surgeon referred her here for evaluation.  She has been putting Betadine and a dry dressing on the area and says she continues to have some mild clear yellow drainage but not as much as she initially had.  She says her back pain is dramatically improved since her fusion but the nonhealing wound has been frustrating.  The wound does not cause her any pain.    Patient has well-controlled diabetes with her most recent hemoglobin A1c being 5.6.  She is not a smoker.    Allergies:  Morphine, Dilaudid [hydromorphone], and Lisinopril      Current Outpatient Medications:     Accu-Chek Evelina Plus test strip, 1 each by Other route Daily., Disp: , Rfl:     atorvastatin (LIPITOR) 10 MG tablet, Take 1 tablet by mouth Every Night., Disp: , Rfl:     brimonidine (ALPHAGAN) 0.2 % ophthalmic solution, Administer 1 drop to both eyes 2 (Two) Times a Day., Disp: , Rfl:     bumetanide (BUMEX) 1 MG tablet, Take 1 tablet by mouth Daily., Disp: , Rfl:     diclofenac (VOLTAREN) 75 MG EC tablet, Take 1 tablet by mouth 2 (Two) Times a Day. LAST DOSE 1/19/23 (Patient not taking: Reported on 11/9/2023), Disp: , Rfl:     Diclofenac Sodium (VOLTAREN) 1 % gel gel, Apply 4 g topically to the appropriate area as directed 4 (Four) Times a Day., Disp: 100 g, Rfl: 2     dorzolamide (TRUSOPT) 2 % ophthalmic solution, Administer 1 drop to both eyes 2 (Two) Times a Day., Disp: , Rfl:     escitalopram (LEXAPRO) 10 MG tablet, Take 1 tablet by mouth Daily., Disp: , Rfl:     furosemide (LASIX) 40 MG tablet, Take 1 tablet by mouth 2 (Two) Times a Day., Disp: , Rfl:     glimepiride (AMARYL) 2 MG tablet, Take 1 tablet by mouth Every Morning Before Breakfast. INST PER ANESTHESIA PROTOCOL, Disp: , Rfl:     hydroCHLOROthiazide (HYDRODIURIL) 25 MG tablet, Take 1 tablet by mouth Daily. INST PER ANESTHESIA PROTOCOL, Disp: , Rfl:     latanoprost (XALATAN) 0.005 % ophthalmic solution, 1 drop Every Night., Disp: , Rfl:     levothyroxine (SYNTHROID, LEVOTHROID) 112 MCG tablet, Take 125 mcg by mouth Daily., Disp: , Rfl:     losartan (COZAAR) 100 MG tablet, Take 1 tablet by mouth Daily. INST PER ANESTHESIA PROTOCOL, Disp: , Rfl:     metoprolol succinate XL (TOPROL-XL) 50 MG 24 hr tablet, Take 1 tablet by mouth 2 (two) times a day. INST PER ANESTHESIA PROTOCOL, Disp: , Rfl:     mupirocin (BACTROBAN) 2 % ointment, Apply 1 application  topically to the appropriate area as directed 3 (Three) Times a Day., Disp: , Rfl:     omeprazole (priLOSEC) 40 MG capsule, omeprazole 40 mg capsule,delayed release, Disp: , Rfl:     rOPINIRole (REQUIP) 4 MG tablet, Take 1 tablet by mouth Every Morning. Pt notes she takes one tablet in the morning and 3 tablets at bedtime., Disp: , Rfl:     rOPINIRole (REQUIP) 4 MG tablet, Take 3 tablets by mouth Every Night., Disp: , Rfl:     spironolactone (ALDACTONE) 50 MG tablet, Take 1 tablet by mouth Daily., Disp: , Rfl:     Past Medical History:   Diagnosis Date    AAA (abdominal aortic aneurysm)     Pt reported/BEING MONITORED    Acid reflux     Arthritis     Blood disease     ABNORMAL PLATELET COUNT/CHRONIC ELEVATED WBC,NO LEUKEMIA    CHB (complete heart block)     Congestive heart failure (CHF)     MANAGED W/DIURETIC, NO RECENT PROBLEMS    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    Diabetes  mellitus     ORAL MED    Hiatal hernia     History of transfusion     LAST IN '90'S    Hyperlipidemia LDL goal <100 08/23/2021    Hypertension     Kidney stone     NO CURRENT ISSUES    Presence of biventricular implantable cardioverter-defibrillator (ICD) 08/23/2021    Sciatica     LEFT    Sleep apnea     Thyroid disorder     hypo     Past Surgical History:   Procedure Laterality Date    APPENDECTOMY      BLADDER SURGERY      REPAIR    CARDIAC DEFIBRILLATOR PLACEMENT      CATARACT EXTRACTION Bilateral     WITH LENS IMPLANT    CHOLECYSTECTOMY      COLONOSCOPY      GASTROPLASTY      FOR WEIGHT LOSS    HEEL SPUR SURGERY      HERNIA REPAIR      ABDOMINAL    HYSTERECTOMY      INGUINAL HERNIA REPAIR Bilateral 8/31/2022    Procedure: INGUINAL HERNIA REPAIR LAPAROSCOPIC WITH DAVINCI ROBOT, lysis of adhesions;  Surgeon: Lauri Celis MD;  Location: [facility] OR [facility];  Service: Robotics - DaVinci;  Laterality: Bilateral;    KIDNEY STONE SURGERY      LUMBAR DISCECTOMY Left 1/27/2023    Procedure: MINIMALLY INVASIVE LUMBAR LATERAL RECESS DECOMPRESSION AND FORAMINOTOMY, LEFT APPROACH, LUMBAR 2-LUMBAR 3;  Surgeon: Ralf Rodriguez MD;  Location: [facility] MAIN OR;  Service: Neurosurgery;  Laterality: Left;    PACEMAKER IMPLANTATION      SPLENECTOMY      TRAUMATIC INJURY R/T GASTROPLASTY SURGERY    TONSILLECTOMY       Social History     Socioeconomic History    Marital status:    Tobacco Use    Smoking status: Never    Smokeless tobacco: Never   Vaping Use    Vaping Use: Never used   Substance and Sexual Activity    Alcohol use: Never    Drug use: Never    Sexual activity: Defer           Objective     Vitals:    11/15/23 0918   BP: 140/80   BP Location: Left arm   Patient Position: Sitting   Cuff Size: Adult   Pulse: 68   Resp: 18  Comment: 98% room air   Temp: 97.8 °F (36.6 °C)   TempSrc: Temporal   PainSc: 0-No pain     There is no height or weight on file to calculate BMI.    CHUCKYADI Fall Risk Assessment has not been  completed.     Review of Systems     ROS:  Per HPI.  No fevers, chills, sweats, or body aches.     I have reviewed the HPI and ROS as documented by MA/RN. Arlette Rosales MD    Physical Exam     NAD  AAOx3, pleasant, cooperative  Small open wound dehiscence mid lower lumbar region with mild very mild maceration.  Unfortunately, there is extensive tunneling/reminding of the wound, including just over 8.5 cm superiorly along the spine.  Probing of that area of tunneling with sterile swab results in a significant amount of serous drainage being expressed from the wound.  No additional serous fluid pockets identified.  Wound tunnels and undermines to the left of midline approximately 3.5 cm.  There is also mild undermining inferiorly.  There are 2 small areas of thin dry callus above the area of dehiscence that appear to be almost fully healed.  No erythema, heat, purulence, induration, or tenderness present.  Remainder of the wound is well epithelialized.    See photos for details.              Result Review :  The following data was reviewed by: Arlette Rosales MD on 11/15/2023:    Hemoglobin A1c 5.6, spine x-rays, ED note, CT myelogram CTL spine, CRP, sed rate             Assessment and Plan   Diagnoses and all orders for this visit:    1. Dehiscence of operative wound, initial encounter (Primary)    2. History of spinal fusion    3. 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  This type of wound is very challenging to try and heal and patient may require additional surgery in the future.  We will have them gently packed the wound cavity with dry iodoform gauze packing twice daily and area kept covered at all times.    Patient should avoid lying on the wound or having any  pressure on this area at all times to prevent excessive pressure or shear stress on the tissues.    Continue good glucose control and increase protein intake to aid healing.    Recheck 2 weeks.    Patient was given instructions and counseling regarding their condition or for health maintenance advice, as well as the wound care plan and recommendations. They understand and agree with the plan.  They will follow back up here in the clinic but are instructed to contact us in the interim should they have any new, returning, or worsening symptoms or concerns. Please see specific information pulled into the AVS if appropriate.     Dragon Dictation utilized for chart completion.    Follow Up   Return in about 2 weeks (around 11/29/2023).      Arlette Rosales MD

## 2023-11-15 NOTE — TELEPHONE ENCOUNTER
I spoke to patient and gave an arrival time of 7:00 on 11/21/23 for [employer] dual chamber pacemaker battery change. Patient was instructed to have a  for the day of the procedure and to arrive at the main entrance/registration area. Patient was instructed to continue all medications as usual. Patient was instructed to be NPO after midnight with sips of water as needed. Patient is agreeable with no other questions or concerns.

## 2023-11-15 NOTE — TELEPHONE ENCOUNTER
Caller: Deisy Lama    Relationship: Self    Best call back number: [phone]     What is the best time to reach you: ANYTIME    Who are you requesting to speak with (clinical staff, provider,  specific staff member): NURSE    What was the call regarding: PATIENT CALLED IN STATING SHE HAS A HEART CATH WITH DR. BRADLEY ON 11.21.23. DR. BRADLEY WANTED PATIENT TO GET AN ECHO DONE BUT THE FIRST AVAILABLE WAS 11.27.23. PATIENT IS UNSURE IF SHE NEEDS TO RESCHEDULE HER HEART CATH.     Is it okay if the provider responds through [facility]: NO, PLEASE CALL.

## 2023-11-15 NOTE — TELEPHONE ENCOUNTER
Detailed VM left stating it fine to have echo scheduled after battery change. Advised to keep current dates

## 2023-11-16 RX ORDER — LEVOTHYROXINE SODIUM 0.12 MG/1
125 TABLET ORAL DAILY
COMMUNITY

## 2023-11-16 NOTE — SIGNIFICANT NOTE
PT REPORTS WILL BE UNABLE TO OBTAIN LABS 24-48 HOURS PRIOR TO PROCEDURE. LABS AND EKG WILL NEED TO BE DONE AM OF PROCEDURE.

## 2023-11-16 NOTE — PRE-PROCEDURE INSTRUCTIONS
PATIENT INSTRUCTED TO BE:    - NPO AFTER MIDNIGHT EXCEPT CAN HAVE SIPS OF WATER WITH HIS MEDICATION PRIOR TO PROCEDURE    -  INSTRUCTED NO LOTIONS, JEWELRY, PIERCINGS, OR DEODORANT DAY OF THE PROCEDURE    - INSTRUCTED TO TAKE THE FOLLOWING MEDICATIONS THE DAY OF SURGERY:       ALPHAGAN EYE DROPS IF NEEDED, DICLOFENAC, TRUSOPT EYE DROPS, LEXAPRO, FUROSEMIDE, GLIMEPIRIDE, HYDROCHLOROTHIAZIDE,LEVOTHYROXINE, LOSARTAN, METOPROLOL, OMEPRAZOLE, ROPINIROLE, SPIRONOLACTONE    - INSTRUCTED PT TO FOLLOW ANY INSTRUCTIONS GIVEN BY HIS CARDIOLOGIST.    - INFORMED PT THEY ATTEMPT RADIAL APPROACH FIRST IF UNABLE TO PERFORM CATH WITH THAT APPROACH THEY WILL DO A FEMORAL APPROACH.  ALSO, INFORMED PT THEY WILL BE ON BEDREST POSTOP.  IF THE SURGEON FEELS  AN INTERVENTION OR STENTS NEEDS TO BE PLACED, HE/SHE WILL STAY OVER NIGHT FOR OBSERVATION AND MONITORING.     - INFORMED THE PATIENT HE CAN HAVE TWO VISITORS WITH HIM THE DAY OF THE PROCEDURE    - INSTRUCTED PT TO PARK IN THE PARKING GARAGE, ENTER THE HOSPITAL THRU ENTRANCE A AND  CHECK IN AT THE MAIN REGISTRATION DESK, AFTER REGISTRATION PT WILL BE TAKEN THE THE PREOP AREA FOR HIS PROCEDURE.    -ARRIVAL TIME GIVEN BY CARDIOLOGIST OFFICE, INFORMED PT IF ARRIVAL TIME CHANGES OR ADJUSTMENTS NEED TO BE MADE IN THEIR ARRIVAL TIME, HE/SHE WOULD RECEIVE A CALL.      - PATIENT VERBALIZED UNDERSTANDING

## 2023-11-17 ENCOUNTER — TRANSCRIBE ORDERS (OUTPATIENT)
Dept: ADMINISTRATIVE | Facility: HOSPITAL | Age: 73
End: 2023-11-17
Payer: MEDICARE

## 2023-11-17 DIAGNOSIS — T81.31XS WOUND DEHISCENCE, SURGICAL, SEQUELA: Primary | ICD-10-CM

## 2023-11-17 DIAGNOSIS — Z98.1 ARTHRODESIS STATUS: ICD-10-CM

## 2023-11-22 ENCOUNTER — TELEPHONE (OUTPATIENT)
Dept: WOUND CARE | Facility: HOSPITAL | Age: 73
End: 2023-11-22
Payer: MEDICARE

## 2023-11-22 NOTE — TELEPHONE ENCOUNTER
HH nurse called stating they did a recert today (11/22/23) and that the pt's wound is much worse. The wound opening has gotten smaller but the tunneling is worse. Wound now measures:    1.5cm x 0.5cm x 0.5cm     Tunneling:  3.8cm @ 12oclock  2.5cm @ 6-9oclock  3.8cm @ 9-12oclock    They are Currently treating wound with Iodoform packing strips.     They noticed an increase of drainage 2-3 days ago. Drainage is mod/large amount of serous fluid, pt denies HA, dizziness, new numbness or tingling but does complain of itching all over, no rash present. No change in VS.     HH nurse scheduled to go back to the home Monday 11/27/23 and pt is scheduled with us at the Wound care center next Thursday 11/30/23.

## 2023-11-22 NOTE — SIGNIFICANT NOTE
SPOKE WITH PT TO MAKE SURE CHART DID NOT NEED TO BE UPDATED SINCE I SPOKE WITH HER ON 11/16/23 AND PROCEDURE WAS RESCHEDULED. PT REPORTS IS HAVING ECHO ON 11/27/23 AND SHE WILL BE ABLE TO HAVE LABS AND EKG DONE ON MONDAY. COMMUNICATION FORM UPDATED

## 2023-11-27 ENCOUNTER — LAB (OUTPATIENT)
Dept: LAB | Facility: HOSPITAL | Age: 73
End: 2023-11-27
Payer: MEDICARE

## 2023-11-27 ENCOUNTER — HOSPITAL ENCOUNTER (OUTPATIENT)
Dept: CARDIOLOGY | Facility: HOSPITAL | Age: 73
Discharge: HOME | End: 2023-11-27
Payer: MEDICARE

## 2023-11-27 DIAGNOSIS — I50.22 CHRONIC HFREF (HEART FAILURE WITH REDUCED EJECTION FRACTION): ICD-10-CM

## 2023-11-27 DIAGNOSIS — Z95.810 PRESENCE OF BIVENTRICULAR IMPLANTABLE CARDIOVERTER-DEFIBRILLATOR (ICD): ICD-10-CM

## 2023-11-27 DIAGNOSIS — I50.42 CHRONIC COMBINED SYSTOLIC AND DIASTOLIC CHF (CONGESTIVE HEART FAILURE): ICD-10-CM

## 2023-11-27 DIAGNOSIS — Z98.1 ARTHRODESIS STATUS: ICD-10-CM

## 2023-11-27 DIAGNOSIS — T81.31XS WOUND DEHISCENCE, SURGICAL, SEQUELA: ICD-10-CM

## 2023-11-27 LAB
ANION GAP SERPL CALCULATED.3IONS-SCNC: 8.8 MMOL/L (ref 5–15)
BH CV ECHO MEAS - AO MAX PG: 11 MMHG
BH CV ECHO MEAS - AO MEAN PG: 6 MMHG
BH CV ECHO MEAS - AO ROOT DIAM: 3.7 CM
BH CV ECHO MEAS - AO V2 MAX: 164 CM/SEC
BH CV ECHO MEAS - AO V2 VTI: 34.3 CM
BH CV ECHO MEAS - AVA(I,D): 2.34 CM2
BH CV ECHO MEAS - EDV(CUBED): 110.6 ML
BH CV ECHO MEAS - EDV(MOD-SP2): 99.5 ML
BH CV ECHO MEAS - EDV(MOD-SP4): 103 ML
BH CV ECHO MEAS - EF(MOD-BP): 58.2 %
BH CV ECHO MEAS - EF(MOD-SP2): 58.9 %
BH CV ECHO MEAS - EF(MOD-SP4): 56.9 %
BH CV ECHO MEAS - ESV(CUBED): 39.3 ML
BH CV ECHO MEAS - ESV(MOD-SP2): 40.9 ML
BH CV ECHO MEAS - ESV(MOD-SP4): 44.4 ML
BH CV ECHO MEAS - FS: 29.2 %
BH CV ECHO MEAS - IVS/LVPW: 1.09 CM
BH CV ECHO MEAS - IVSD: 1.2 CM
BH CV ECHO MEAS - LA DIMENSION: 4.5 CM
BH CV ECHO MEAS - LAT PEAK E' VEL: 4.7 CM/SEC
BH CV ECHO MEAS - LV MASS(C)D: 206.4 GRAMS
BH CV ECHO MEAS - LV MAX PG: 7.4 MMHG
BH CV ECHO MEAS - LV MEAN PG: 4 MMHG
BH CV ECHO MEAS - LV V1 MAX: 136 CM/SEC
BH CV ECHO MEAS - LV V1 VTI: 25.6 CM
BH CV ECHO MEAS - LVIDD: 4.8 CM
BH CV ECHO MEAS - LVIDS: 3.4 CM
BH CV ECHO MEAS - LVOT AREA: 3.1 CM2
BH CV ECHO MEAS - LVOT DIAM: 2 CM
BH CV ECHO MEAS - LVPWD: 1.1 CM
BH CV ECHO MEAS - MED PEAK E' VEL: 4.6 CM/SEC
BH CV ECHO MEAS - MV A MAX VEL: 120 CM/SEC
BH CV ECHO MEAS - MV DEC SLOPE: 373 CM/SEC2
BH CV ECHO MEAS - MV DEC TIME: 0.22 SEC
BH CV ECHO MEAS - MV E MAX VEL: 78.1 CM/SEC
BH CV ECHO MEAS - MV E/A: 0.65
BH CV ECHO MEAS - MV P1/2T: 65.5 MSEC
BH CV ECHO MEAS - MVA(P1/2T): 3.4 CM2
BH CV ECHO MEAS - RAP SYSTOLE: 3 MMHG
BH CV ECHO MEAS - RVDD: 2.9 CM
BH CV ECHO MEAS - RVSP: 36 MMHG
BH CV ECHO MEAS - SV(LVOT): 80.4 ML
BH CV ECHO MEAS - SV(MOD-SP2): 58.6 ML
BH CV ECHO MEAS - SV(MOD-SP4): 58.6 ML
BH CV ECHO MEAS - TR MAX PG: 32.5 MMHG
BH CV ECHO MEAS - TR MAX VEL: 285 CM/SEC
BH CV ECHO MEASUREMENTS AVERAGE E/E' RATIO: 16.8
BUN SERPL-MCNC: 30 MG/DL (ref 8–23)
BUN/CREAT SERPL: 21.6 (ref 7–25)
CALCIUM SPEC-SCNC: 9.6 MG/DL (ref 8.6–10.5)
CHLORIDE SERPL-SCNC: 107 MMOL/L (ref 98–107)
CO2 SERPL-SCNC: 24.2 MMOL/L (ref 22–29)
CREAT SERPL-MCNC: 1.39 MG/DL (ref 0.57–1)
CRP SERPL-MCNC: <0.3 MG/DL (ref 0–0.5)
DEPRECATED RDW RBC AUTO: 40.5 FL (ref 37–54)
EGFRCR SERPLBLD CKD-EPI 2021: 39.7 ML/MIN/1.73
ERYTHROCYTE [DISTWIDTH] IN BLOOD BY AUTOMATED COUNT: 12.4 % (ref 12.3–15.4)
ERYTHROCYTE [SEDIMENTATION RATE] IN BLOOD: 18 MM/HR (ref 0–30)
GLUCOSE SERPL-MCNC: 57 MG/DL (ref 65–99)
HCT VFR BLD AUTO: 36.6 % (ref 34–46.6)
HGB BLD-MCNC: 12.1 G/DL (ref 12–15.9)
INR PPP: 0.96 (ref 0.86–1.15)
IVRT: 66 MS
MCH RBC QN AUTO: 29.8 PG (ref 26.6–33)
MCHC RBC AUTO-ENTMCNC: 33.1 G/DL (ref 31.5–35.7)
MCV RBC AUTO: 90.1 FL (ref 79–97)
PLATELET # BLD AUTO: 521 10*3/MM3 (ref 140–450)
PMV BLD AUTO: 9.7 FL (ref 6–12)
POTASSIUM SERPL-SCNC: 4.2 MMOL/L (ref 3.5–5.2)
PROTHROMBIN TIME: 13 SECONDS (ref 11.8–14.9)
RBC # BLD AUTO: 4.06 10*6/MM3 (ref 3.77–5.28)
SODIUM SERPL-SCNC: 140 MMOL/L (ref 136–145)
WBC NRBC COR # BLD AUTO: 11.57 10*3/MM3 (ref 3.4–10.8)

## 2023-11-27 PROCEDURE — 80048 BASIC METABOLIC PNL TOTAL CA: CPT

## 2023-11-27 PROCEDURE — 93306 TTE W/DOPPLER COMPLETE: CPT | Performed by: INTERNAL MEDICINE

## 2023-11-27 PROCEDURE — 36415 COLL VENOUS BLD VENIPUNCTURE: CPT

## 2023-11-27 PROCEDURE — 85027 COMPLETE CBC AUTOMATED: CPT

## 2023-11-27 PROCEDURE — 93005 ELECTROCARDIOGRAM TRACING: CPT | Performed by: INTERNAL MEDICINE

## 2023-11-27 PROCEDURE — 93306 TTE W/DOPPLER COMPLETE: CPT

## 2023-11-27 PROCEDURE — 87635 SARS-COV-2 COVID-19 AMP PRB: CPT | Performed by: NURSE PRACTITIONER

## 2023-11-27 PROCEDURE — 85610 PROTHROMBIN TIME: CPT

## 2023-11-27 PROCEDURE — 85652 RBC SED RATE AUTOMATED: CPT

## 2023-11-27 PROCEDURE — 86140 C-REACTIVE PROTEIN: CPT

## 2023-11-28 ENCOUNTER — HOSPITAL ENCOUNTER (OUTPATIENT)
Facility: HOSPITAL | Age: 73
Setting detail: HOSPITAL OUTPATIENT SURGERY
Discharge: HOME | End: 2023-11-28
Attending: INTERNAL MEDICINE | Admitting: INTERNAL MEDICINE
Payer: MEDICARE

## 2023-11-28 VITALS
OXYGEN SATURATION: 93 % | HEIGHT: 65 IN | RESPIRATION RATE: 16 BRPM | BODY MASS INDEX: 29.35 KG/M2 | HEART RATE: 57 BPM | DIASTOLIC BLOOD PRESSURE: 76 MMHG | WEIGHT: 176.15 LBS | SYSTOLIC BLOOD PRESSURE: 146 MMHG

## 2023-11-28 DIAGNOSIS — Z95.810 PRESENCE OF BIVENTRICULAR IMPLANTABLE CARDIOVERTER-DEFIBRILLATOR (ICD): ICD-10-CM

## 2023-11-28 PROCEDURE — C1882 AICD, OTHER THAN SING/DUAL: HCPCS | Performed by: INTERNAL MEDICINE

## 2023-11-28 PROCEDURE — 33264 RMVL & RPLCMT DFB GEN MLT LD: CPT | Performed by: INTERNAL MEDICINE

## 2023-11-28 PROCEDURE — 25010000002 MIDAZOLAM PER 1MG: Performed by: INTERNAL MEDICINE

## 2023-11-28 PROCEDURE — 25010000002 FENTANYL CITRATE (PF) 50 MCG/ML SOLUTION: Performed by: INTERNAL MEDICINE

## 2023-11-28 PROCEDURE — 25010000002 CEFAZOLIN IN DEXTROSE 2-4 GM/100ML-% SOLUTION: Performed by: INTERNAL MEDICINE

## 2023-11-28 PROCEDURE — 25010000002 BUPIVACAINE (PF) 0.5 % SOLUTION: Performed by: INTERNAL MEDICINE

## 2023-11-28 PROCEDURE — 25010000002 CEFAZOLIN IN DEXTROSE 2000 MG/ 100 ML SOLUTION: Performed by: INTERNAL MEDICINE

## 2023-11-28 DEVICE — IMPLANTABLE DEVICE: Type: IMPLANTABLE DEVICE | Status: FUNCTIONAL

## 2023-11-28 RX ORDER — CEFAZOLIN SODIUM 2 G/100ML
INJECTION, SOLUTION INTRAVENOUS
Status: COMPLETED | OUTPATIENT
Start: 2023-11-28 | End: 2023-11-28

## 2023-11-28 RX ORDER — SODIUM CHLORIDE 0.9 % (FLUSH) 0.9 %
10 SYRINGE (ML) INJECTION AS NEEDED
Status: DISCONTINUED | OUTPATIENT
Start: 2023-11-28 | End: 2023-11-28 | Stop reason: HOSPADM

## 2023-11-28 RX ORDER — BUPIVACAINE HYDROCHLORIDE 5 MG/ML
INJECTION, SOLUTION EPIDURAL; INTRACAUDAL
Status: DISCONTINUED | OUTPATIENT
Start: 2023-11-28 | End: 2023-11-28 | Stop reason: HOSPADM

## 2023-11-28 RX ORDER — LIDOCAINE HYDROCHLORIDE 20 MG/ML
INJECTION, SOLUTION INFILTRATION; PERINEURAL
Status: DISCONTINUED | OUTPATIENT
Start: 2023-11-28 | End: 2023-11-28 | Stop reason: HOSPADM

## 2023-11-28 RX ORDER — FENTANYL CITRATE 50 UG/ML
INJECTION, SOLUTION INTRAMUSCULAR; INTRAVENOUS
Status: DISCONTINUED | OUTPATIENT
Start: 2023-11-28 | End: 2023-11-28 | Stop reason: HOSPADM

## 2023-11-28 RX ORDER — SPIRONOLACTONE 50 MG/1
50 TABLET, FILM COATED ORAL DAILY
COMMUNITY
End: 2023-11-28 | Stop reason: HOSPADM

## 2023-11-28 RX ORDER — ACETAMINOPHEN 325 MG/1
650 TABLET ORAL EVERY 4 HOURS PRN
Status: DISCONTINUED | OUTPATIENT
Start: 2023-11-28 | End: 2023-11-28 | Stop reason: HOSPADM

## 2023-11-28 RX ORDER — SODIUM CHLORIDE 9 MG/ML
40 INJECTION, SOLUTION INTRAVENOUS AS NEEDED
Status: DISCONTINUED | OUTPATIENT
Start: 2023-11-28 | End: 2023-11-28 | Stop reason: HOSPADM

## 2023-11-28 RX ORDER — CEFAZOLIN SODIUM 2 G/100ML
2 INJECTION, SOLUTION INTRAVENOUS ONCE
Status: COMPLETED | OUTPATIENT
Start: 2023-11-28 | End: 2023-11-28

## 2023-11-28 RX ORDER — ONDANSETRON 2 MG/ML
4 INJECTION INTRAMUSCULAR; INTRAVENOUS EVERY 6 HOURS PRN
Status: DISCONTINUED | OUTPATIENT
Start: 2023-11-28 | End: 2023-11-28 | Stop reason: HOSPADM

## 2023-11-28 RX ORDER — SODIUM CHLORIDE 0.9 % (FLUSH) 0.9 %
10 SYRINGE (ML) INJECTION EVERY 12 HOURS SCHEDULED
Status: DISCONTINUED | OUTPATIENT
Start: 2023-11-28 | End: 2023-11-28 | Stop reason: HOSPADM

## 2023-11-28 RX ORDER — SODIUM CHLORIDE 0.9 % (FLUSH) 0.9 %
3 SYRINGE (ML) INJECTION EVERY 12 HOURS SCHEDULED
Status: DISCONTINUED | OUTPATIENT
Start: 2023-11-28 | End: 2023-11-28 | Stop reason: HOSPADM

## 2023-11-28 RX ORDER — ACETAMINOPHEN 650 MG/1
650 SUPPOSITORY RECTAL EVERY 4 HOURS PRN
Status: DISCONTINUED | OUTPATIENT
Start: 2023-11-28 | End: 2023-11-28 | Stop reason: HOSPADM

## 2023-11-28 RX ORDER — MIDAZOLAM HYDROCHLORIDE 2 MG/2ML
INJECTION, SOLUTION INTRAMUSCULAR; INTRAVENOUS
Status: DISCONTINUED | OUTPATIENT
Start: 2023-11-28 | End: 2023-11-28 | Stop reason: HOSPADM

## 2023-11-28 RX ORDER — CEPHALEXIN 500 MG/1
500 CAPSULE ORAL 3 TIMES DAILY
Qty: 12 CAPSULE | Refills: 0 | Status: SHIPPED | OUTPATIENT
Start: 2023-11-28

## 2023-11-28 RX ADMIN — CEFAZOLIN SODIUM 2 G: 2 INJECTION, SOLUTION INTRAVENOUS at 12:00

## 2023-11-28 NOTE — NURSING NOTE
Patient returned from procedure stable. Scant amount of blood on dressing was noted. Site showed no further signs of bleeding or swelling. Patient received second dose of IV ABX. Patient and son were educated on discharge instructions. Patient and son verbalized understanding. IV removed.

## 2023-11-28 NOTE — Clinical Note
Prepped: left chest and left neck. Prepped with: ChloraPrep. The patient was draped in a sterile fashion.

## 2023-11-29 LAB
QT INTERVAL: 521 MS
QTC INTERVAL: 555 MS

## 2023-11-30 ENCOUNTER — OFFICE VISIT (OUTPATIENT)
Dept: WOUND CARE | Facility: HOSPITAL | Age: 73
End: 2023-11-30
Payer: MEDICARE

## 2023-11-30 VITALS
HEART RATE: 63 BPM | TEMPERATURE: 99.7 F | DIASTOLIC BLOOD PRESSURE: 72 MMHG | SYSTOLIC BLOOD PRESSURE: 104 MMHG | RESPIRATION RATE: 16 BRPM

## 2023-11-30 DIAGNOSIS — E11.628 CONTROLLED TYPE 2 DIABETES MELLITUS WITH OTHER SKIN COMPLICATION, WITHOUT LONG-TERM CURRENT USE OF INSULIN: ICD-10-CM

## 2023-11-30 DIAGNOSIS — Z98.1 HISTORY OF SPINAL FUSION: ICD-10-CM

## 2023-11-30 DIAGNOSIS — T81.31XA DEHISCENCE OF OPERATIVE WOUND, INITIAL ENCOUNTER: Primary | ICD-10-CM

## 2023-11-30 PROCEDURE — 3074F SYST BP LT 130 MM HG: CPT | Performed by: EMERGENCY MEDICINE

## 2023-11-30 PROCEDURE — 3078F DIAST BP <80 MM HG: CPT | Performed by: EMERGENCY MEDICINE

## 2023-11-30 PROCEDURE — G0463 HOSPITAL OUTPT CLINIC VISIT: HCPCS | Performed by: EMERGENCY MEDICINE

## 2023-11-30 PROCEDURE — 1160F RVW MEDS BY RX/DR IN RCRD: CPT | Performed by: EMERGENCY MEDICINE

## 2023-11-30 PROCEDURE — 1159F MED LIST DOCD IN RCRD: CPT | Performed by: EMERGENCY MEDICINE

## 2023-11-30 NOTE — PROGRESS NOTES
"Chief Complaint  Wound Check (WOUND CHECK TO MIDLINE SPINE, PT IS \"TIRED OF PACKING THE WOUND BUT KNOWS IT NEEDS TO BE DONE\". THE WOUND IS BEING PACKED DAILY. )    Subjective      History of Present Illness    Deisy Lama  is a 73 y.o. female who underwent extensive spinal fusion surgery in early September 2023.  She has posterior rods with bilateral pedicle screws extending from T9-S1 as well as bilateral iliac screws.  Midline spinal wound had sutures and glue and majority of the wound healed well but there is an area of wound dehiscence and the patient's surgeon referred her here for evaluation.  She says her back pain is dramatically improved since her fusion but the nonhealing wound has been frustrating.  The wound does not cause her any pain.    Patient has well-controlled diabetes with her most recent hemoglobin A1c being 5.6.  She is not a smoker.    They have been packing the wound with iodoform gauze packing strips and changing it daily.  They have continued to use Betadine gauze on the outside.  They report that there is still drainage from the wound whenever the dressing is changed but it does not spontaneously leak from the wound or saturate the dressings in between dressing changes.    Allergies:  Morphine, Dilaudid [hydromorphone], Latex, and Lisinopril      Current Outpatient Medications:     amoxicillin (AMOXIL) 875 MG tablet, Take 1 tablet by mouth 2 (Two) Times a Day for 10 days., Disp: 20 tablet, Rfl: 0    cephalexin (Keflex) 500 MG capsule, Take 1 capsule by mouth 3 (Three) Times a Day., Disp: 12 capsule, Rfl: 0    atorvastatin (LIPITOR) 10 MG tablet, Take 1 tablet by mouth Every Night., Disp: , Rfl:     brimonidine (ALPHAGAN) 0.2 % ophthalmic solution, Administer 1 drop to both eyes 2 (Two) Times a Day., Disp: , Rfl:     diclofenac (VOLTAREN) 75 MG EC tablet, Take 1 tablet by mouth 2 (Two) Times a Day., Disp: , Rfl:     dorzolamide (TRUSOPT) 2 % ophthalmic solution, Administer 1 drop to " both eyes 2 (Two) Times a Day., Disp: , Rfl:     escitalopram (LEXAPRO) 10 MG tablet, Take 1 tablet by mouth Daily., Disp: , Rfl:     glimepiride (AMARYL) 2 MG tablet, Take 1 tablet by mouth Every Morning Before Breakfast., Disp: , Rfl:     hydroCHLOROthiazide (HYDRODIURIL) 25 MG tablet, Take 1 tablet by mouth Daily., Disp: , Rfl:     latanoprost (XALATAN) 0.005 % ophthalmic solution, Administer 1 drop to both eyes Every Night., Disp: , Rfl:     levothyroxine (SYNTHROID, LEVOTHROID) 125 MCG tablet, Take 1 tablet by mouth Daily., Disp: , Rfl:     losartan (COZAAR) 100 MG tablet, Take 1 tablet by mouth Daily., Disp: , Rfl:     metoprolol succinate XL (TOPROL-XL) 50 MG 24 hr tablet, Take 1 tablet by mouth 2 (two) times a day., Disp: , Rfl:     omeprazole (priLOSEC) 40 MG capsule, Take 1 capsule by mouth Daily., Disp: , Rfl:     Restasis 0.05 % ophthalmic emulsion, Administer 1 drop to both eyes 2 (Two) Times a Day., Disp: , Rfl:     rOPINIRole (REQUIP) 4 MG tablet, Take 1 tablet by mouth Daily With Breakfast & Lunch., Disp: , Rfl:     rOPINIRole (REQUIP) 4 MG tablet, Take 2 tablets by mouth Every Night., Disp: , Rfl:     Past Medical History:   Diagnosis Date    AAA (abdominal aortic aneurysm)     Pt reported/BEING MONITORED    Acid reflux     Arthritis     Blood disease     ABNORMAL PLATELET COUNT/CHRONIC ELEVATED WBC,NO LEUKEMIA    CHB (complete heart block)     PACEMAKER    Congestive heart failure (CHF)     MANAGED W/DIURETIC, NO RECENT PROBLEMS    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    Diabetes mellitus     AVERAGE AM BS 'S    Glaucoma     Hiatal hernia     History of transfusion     LAST 2023 REPORTS NO TRANSFUSION REACTION POST SPINAL FUSION    Hyperlipidemia LDL goal <100 08/23/2021    Hypertension     Kidney stone     NO CURRENT ISSUES    Presence of biventricular implantable cardioverter-defibrillator (ICD) 08/23/2021    Sciatica     LEFT    Sleep apnea     Thyroid disorder     hypo    Wound of back      REPORTS HAS NOT HEALED SINCE BACK SURGERY AND IS FOLLOWED BY WOUND CARE     Past Surgical History:   Procedure Laterality Date    APPENDECTOMY      BLADDER SURGERY      REPAIR    CARDIAC DEFIBRILLATOR PLACEMENT      CATARACT EXTRACTION Bilateral     WITH LENS IMPLANT    CHOLECYSTECTOMY      COLONOSCOPY      GASTROPLASTY      FOR WEIGHT LOSS    HEEL SPUR SURGERY      HERNIA REPAIR      ABDOMINAL    HYSTERECTOMY      INGUINAL HERNIA REPAIR Bilateral 8/31/2022    Procedure: INGUINAL HERNIA REPAIR LAPAROSCOPIC WITH DAVINCI ROBOT, lysis of adhesions;  Surgeon: Lauri Celis MD;  Location: [facility] OR [facility] [place];  Service: Robotics - DaVinci;  Laterality: Bilateral;    KIDNEY STONE SURGERY      LUMBAR DISCECTOMY Left 1/27/2023    Procedure: MINIMALLY INVASIVE LUMBAR LATERAL RECESS DECOMPRESSION AND FORAMINOTOMY, LEFT APPROACH, LUMBAR 2-LUMBAR 3;  Surgeon: Ralf Rodriguez MD;  Location: [facility] MAIN OR;  Service: Neurosurgery;  Laterality: Left;    PACEMAKER IMPLANTATION      PACEMAKER REPLACEMENT N/A 11/28/2023    Procedure: PPM generator change - dual;  Surgeon: Michael Bernal MD;  Location: [facility] CATH INVASIVE LOCATION;  Service: Cardiovascular;  Laterality: N/A;    SPLENECTOMY      TRAUMATIC INJURY R/T GASTROPLASTY SURGERY    TONSILLECTOMY       Social History     Socioeconomic History    Marital status:    Tobacco Use    Smoking status: Never    Smokeless tobacco: Never   Vaping Use    Vaping Use: Never used   Substance and Sexual Activity    Alcohol use: Never    Drug use: Never    Sexual activity: Defer           Objective     Vitals:    11/30/23 1501   BP: 104/72   BP Location: Right arm   Patient Position: Sitting   Pulse: 63   Resp: 16  Comment: 97% RA   Temp: 99.7 °F (37.6 °C)   TempSrc: Temporal   PainSc: 0-No pain     There is no height or weight on file to calculate BMI.    STEADI Fall Risk Assessment has not been completed.     Review of Systems     ROS:  Per HPI.  No fevers, chills, sweats, or body aches.      I have reviewed the HPI and ROS as documented by MA/RN. Arlette Rosales MD    Physical Exam     NAD  AAOx3, pleasant, cooperative  Small open wound dehiscence mid lower lumbar region without maceration, significantly smaller.  The areas of tunneling/undermining are also improved.  The tunnel extending superiorly along the spine is down to 5.2 cm from 8.5 cm and significantly narrower.  Minimal serous drainage expressed, no evidence of infection.  The tunneling inferiorly and to the left of midline is also improving and filling in.  No erythema, heat, purulence, induration, or tenderness present.  Mild surrounding skin irritation from adhesive use.    See photos for details.              Result Review :  The following data was reviewed by: Arlette Rosales MD on 11/30/2023:    Prior notes and images.             Assessment and Plan   Diagnoses and all orders for this visit:    1. Dehiscence of operative wound, initial encounter (Primary)    2. History of spinal fusion    3. 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  They have been packing the wound once daily.  I have advised them to continue gently packing the wound cavity with dry iodoform gauze packing and recommend they increase to twice daily.  The area is to be kept covered at all times.  They have been covering the wound with Betadine soaked gauze and do not need to, just a silicone bordered gauze dressing such as Mepilex or sterile dry gauze is sufficient.    Patient should avoid lying on the wound or having any pressure on this area at all times to prevent excessive pressure or shear stress on the tissues.    Continue good glucose control and increase protein intake to aid healing.    Recheck 3 weeks.    Patient was given instructions and counseling regarding their condition or for health maintenance advice, as well as the wound care plan and  recommendations. They understand and agree with the plan.  They will follow back up here in the clinic but are instructed to contact us in the interim should they have any new, returning, or worsening symptoms or concerns. Please see specific information pulled into the AVS if appropriate.     Dragon Dictation utilized for chart completion.    Follow Up   Return in about 3 weeks (around 12/21/2023).      Arlette Rosales MD

## 2023-12-05 ENCOUNTER — CLINICAL SUPPORT NO REQUIREMENTS (OUTPATIENT)
Dept: CARDIOLOGY | Facility: CLINIC | Age: 73
End: 2023-12-05
Payer: MEDICARE

## 2023-12-05 DIAGNOSIS — I44.2 CHB (COMPLETE HEART BLOCK): ICD-10-CM

## 2023-12-05 DIAGNOSIS — Z95.810 PRESENCE OF BIVENTRICULAR IMPLANTABLE CARDIOVERTER-DEFIBRILLATOR (ICD): Primary | ICD-10-CM

## 2023-12-05 NOTE — PROGRESS NOTES
Normal BI-V ICD Device Interrogation and Device Testing.  Normal evaluation of device function and lead measurements.  No optimization was needed of parameters or maximization of device longevity.  Patient is on automated Home Remote Monitoring.  Patients wound incision is healing and all corners are intact.  There are no signs of infection, no drainage, no swelling and cool to touch.  We reviewed Home remote follow up and the monitoring machine.  We also discussed the six week healing process and the do's and don'ts of activity that it specific to the patient.

## 2023-12-13 ENCOUNTER — LAB REQUISITION (OUTPATIENT)
Dept: LAB | Facility: HOSPITAL | Age: 73
End: 2023-12-13
Payer: MEDICARE

## 2023-12-13 DIAGNOSIS — N18.31 CHRONIC KIDNEY DISEASE, STAGE 3A: ICD-10-CM

## 2023-12-13 DIAGNOSIS — E78.5 HYPERLIPIDEMIA, UNSPECIFIED: ICD-10-CM

## 2023-12-13 DIAGNOSIS — E13.10 OTHER SPECIFIED DIABETES MELLITUS WITH KETOACIDOSIS WITHOUT COMA: ICD-10-CM

## 2023-12-13 DIAGNOSIS — E11.22 TYPE 2 DIABETES MELLITUS WITH DIABETIC CHRONIC KIDNEY DISEASE: ICD-10-CM

## 2023-12-13 LAB
ALBUMIN SERPL-MCNC: 3.4 G/DL (ref 3.5–5.2)
ALBUMIN UR-MCNC: <1.2 MG/DL
ALBUMIN/GLOB SERPL: 1 G/DL
ALP SERPL-CCNC: 120 U/L (ref 39–117)
ALT SERPL W P-5'-P-CCNC: 25 U/L (ref 1–33)
ANION GAP SERPL CALCULATED.3IONS-SCNC: 12.9 MMOL/L (ref 5–15)
AST SERPL-CCNC: 22 U/L (ref 1–32)
BASOPHILS # BLD AUTO: 0.12 10*3/MM3 (ref 0–0.2)
BASOPHILS NFR BLD AUTO: 1.2 % (ref 0–1.5)
BILIRUB SERPL-MCNC: 0.2 MG/DL (ref 0–1.2)
BUN SERPL-MCNC: 48 MG/DL (ref 8–23)
BUN/CREAT SERPL: 24.6 (ref 7–25)
CALCIUM SPEC-SCNC: 9.3 MG/DL (ref 8.6–10.5)
CHLORIDE SERPL-SCNC: 109 MMOL/L (ref 98–107)
CHOLEST SERPL-MCNC: 102 MG/DL (ref 0–200)
CO2 SERPL-SCNC: 20.1 MMOL/L (ref 22–29)
CREAT SERPL-MCNC: 1.95 MG/DL (ref 0.57–1)
CREAT UR-MCNC: 63 MG/DL
DEPRECATED RDW RBC AUTO: 51.3 FL (ref 37–54)
EGFRCR SERPLBLD CKD-EPI 2021: 26.4 ML/MIN/1.73
EOSINOPHIL # BLD AUTO: 0.69 10*3/MM3 (ref 0–0.4)
EOSINOPHIL NFR BLD AUTO: 6.7 % (ref 0.3–6.2)
ERYTHROCYTE [DISTWIDTH] IN BLOOD BY AUTOMATED COUNT: 14.4 % (ref 12.3–15.4)
GLOBULIN UR ELPH-MCNC: 3.4 GM/DL
GLUCOSE SERPL-MCNC: 129 MG/DL (ref 65–99)
HCT VFR BLD AUTO: 39.8 % (ref 34–46.6)
HDLC SERPL-MCNC: 33 MG/DL (ref 40–60)
HGB BLD-MCNC: 11.9 G/DL (ref 12–15.9)
IMM GRANULOCYTES # BLD AUTO: 0.01 10*3/MM3 (ref 0–0.05)
IMM GRANULOCYTES NFR BLD AUTO: 0.1 % (ref 0–0.5)
LDLC SERPL CALC-MCNC: 48 MG/DL (ref 0–100)
LDLC/HDLC SERPL: 1.42 {RATIO}
LYMPHOCYTES # BLD AUTO: 2.71 10*3/MM3 (ref 0.7–3.1)
LYMPHOCYTES NFR BLD AUTO: 26.4 % (ref 19.6–45.3)
MCH RBC QN AUTO: 29.2 PG (ref 26.6–33)
MCHC RBC AUTO-ENTMCNC: 29.9 G/DL (ref 31.5–35.7)
MCV RBC AUTO: 97.5 FL (ref 79–97)
MONOCYTES # BLD AUTO: 1.07 10*3/MM3 (ref 0.1–0.9)
MONOCYTES NFR BLD AUTO: 10.4 % (ref 5–12)
NEUTROPHILS NFR BLD AUTO: 5.65 10*3/MM3 (ref 1.7–7)
NEUTROPHILS NFR BLD AUTO: 55.2 % (ref 42.7–76)
NRBC BLD AUTO-RTO: 0 /100 WBC (ref 0–0.2)
PLATELET # BLD AUTO: 491 10*3/MM3 (ref 140–450)
PMV BLD AUTO: 10.5 FL (ref 6–12)
POTASSIUM SERPL-SCNC: 4.3 MMOL/L (ref 3.5–5.2)
PROT SERPL-MCNC: 6.8 G/DL (ref 6–8.5)
RBC # BLD AUTO: 4.08 10*6/MM3 (ref 3.77–5.28)
SODIUM SERPL-SCNC: 142 MMOL/L (ref 136–145)
TRIGL SERPL-MCNC: 111 MG/DL (ref 0–150)
VLDLC SERPL-MCNC: 21 MG/DL (ref 5–40)
WBC NRBC COR # BLD AUTO: 10.25 10*3/MM3 (ref 3.4–10.8)

## 2023-12-13 PROCEDURE — 82043 UR ALBUMIN QUANTITATIVE: CPT | Performed by: INTERNAL MEDICINE

## 2023-12-13 PROCEDURE — 82570 ASSAY OF URINE CREATININE: CPT | Performed by: INTERNAL MEDICINE

## 2023-12-13 PROCEDURE — 80053 COMPREHEN METABOLIC PANEL: CPT | Performed by: INTERNAL MEDICINE

## 2023-12-13 PROCEDURE — 85025 COMPLETE CBC W/AUTO DIFF WBC: CPT | Performed by: INTERNAL MEDICINE

## 2023-12-13 PROCEDURE — 80061 LIPID PANEL: CPT | Performed by: INTERNAL MEDICINE

## 2023-12-13 PROCEDURE — 83036 HEMOGLOBIN GLYCOSYLATED A1C: CPT | Performed by: INTERNAL MEDICINE

## 2023-12-14 LAB — HBA1C MFR BLD: 6.3 % (ref 4.8–5.6)

## 2023-12-21 ENCOUNTER — OFFICE VISIT (OUTPATIENT)
Dept: WOUND CARE | Facility: HOSPITAL | Age: 73
End: 2023-12-21
Payer: MEDICARE

## 2023-12-21 VITALS
HEART RATE: 66 BPM | DIASTOLIC BLOOD PRESSURE: 86 MMHG | RESPIRATION RATE: 18 BRPM | TEMPERATURE: 98.5 F | SYSTOLIC BLOOD PRESSURE: 128 MMHG

## 2023-12-21 DIAGNOSIS — T81.31XA DEHISCENCE OF OPERATIVE WOUND, INITIAL ENCOUNTER: Primary | ICD-10-CM

## 2023-12-21 DIAGNOSIS — Z98.1 HISTORY OF SPINAL FUSION: ICD-10-CM

## 2023-12-21 DIAGNOSIS — E11.628 CONTROLLED TYPE 2 DIABETES MELLITUS WITH OTHER SKIN COMPLICATION, WITHOUT LONG-TERM CURRENT USE OF INSULIN: ICD-10-CM

## 2023-12-21 PROCEDURE — 3079F DIAST BP 80-89 MM HG: CPT | Performed by: EMERGENCY MEDICINE

## 2023-12-21 PROCEDURE — 1160F RVW MEDS BY RX/DR IN RCRD: CPT | Performed by: EMERGENCY MEDICINE

## 2023-12-21 PROCEDURE — G0463 HOSPITAL OUTPT CLINIC VISIT: HCPCS | Performed by: EMERGENCY MEDICINE

## 2023-12-21 PROCEDURE — 3074F SYST BP LT 130 MM HG: CPT | Performed by: EMERGENCY MEDICINE

## 2023-12-21 PROCEDURE — 1159F MED LIST DOCD IN RCRD: CPT | Performed by: EMERGENCY MEDICINE

## 2023-12-21 NOTE — PROGRESS NOTES
Chief Complaint  Wound Check (WOUND CHECK TO SPINE, PT STATES THEY FEEL LIKE THE WOUND IS HEALING  (BS 95))    Subjective      History of Present Illness    Deisy Lama  is a 73 y.o. female who underwent extensive spinal fusion surgery in early September 2023.  She has posterior rods with bilateral pedicle screws extending from T9-S1 as well as bilateral iliac screws.  Midline spinal wound had sutures and glue and majority of the wound healed well but there is an area of wound dehiscence and the patient's surgeon referred her here for evaluation.  She says her back pain is dramatically improved since her fusion but the nonhealing wound has been frustrating.  The wound does not cause her any pain.    Patient has well-controlled diabetes and she is not a smoker.    They have been packing the wound with iodoform gauze packing strips and changing it twice daily.  She says there is still drainage from the wound but now that they are changing it twice a day it does not usually soak through the bandages between changes.    Allergies:  Morphine, Dilaudid [hydromorphone], Latex, and Lisinopril      Current Outpatient Medications:     atorvastatin (LIPITOR) 10 MG tablet, Take 1 tablet by mouth Every Night., Disp: , Rfl:     brimonidine (ALPHAGAN) 0.2 % ophthalmic solution, Administer 1 drop to both eyes 2 (Two) Times a Day., Disp: , Rfl:     cephalexin (Keflex) 500 MG capsule, Take 1 capsule by mouth 3 (Three) Times a Day., Disp: 12 capsule, Rfl: 0    diclofenac (VOLTAREN) 75 MG EC tablet, Take 1 tablet by mouth 2 (Two) Times a Day., Disp: , Rfl:     dorzolamide (TRUSOPT) 2 % ophthalmic solution, Administer 1 drop to both eyes 2 (Two) Times a Day., Disp: , Rfl:     escitalopram (LEXAPRO) 10 MG tablet, Take 1 tablet by mouth Daily., Disp: , Rfl:     glimepiride (AMARYL) 2 MG tablet, Take 1 tablet by mouth Every Morning Before Breakfast., Disp: , Rfl:     hydroCHLOROthiazide (HYDRODIURIL) 25 MG tablet, Take 1 tablet by  mouth Daily., Disp: , Rfl:     latanoprost (XALATAN) 0.005 % ophthalmic solution, Administer 1 drop to both eyes Every Night., Disp: , Rfl:     levothyroxine (SYNTHROID, LEVOTHROID) 125 MCG tablet, Take 1 tablet by mouth Daily., Disp: , Rfl:     losartan (COZAAR) 100 MG tablet, Take 1 tablet by mouth Daily., Disp: , Rfl:     metoprolol succinate XL (TOPROL-XL) 50 MG 24 hr tablet, Take 1 tablet by mouth 2 (two) times a day., Disp: , Rfl:     omeprazole (priLOSEC) 40 MG capsule, Take 1 capsule by mouth Daily., Disp: , Rfl:     Restasis 0.05 % ophthalmic emulsion, Administer 1 drop to both eyes 2 (Two) Times a Day., Disp: , Rfl:     rOPINIRole (REQUIP) 4 MG tablet, Take 1 tablet by mouth Daily With Breakfast & Lunch., Disp: , Rfl:     rOPINIRole (REQUIP) 4 MG tablet, Take 2 tablets by mouth Every Night., Disp: , Rfl:     Past Medical History:   Diagnosis Date    AAA (abdominal aortic aneurysm)     Pt reported/BEING MONITORED    Acid reflux     Arthritis     Blood disease     ABNORMAL PLATELET COUNT/CHRONIC ELEVATED WBC,NO LEUKEMIA    CHB (complete heart block)     PACEMAKER    Congestive heart failure (CHF)     MANAGED W/DIURETIC, NO RECENT PROBLEMS    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    Diabetes mellitus     AVERAGE AM BS 'S    Glaucoma     Hiatal hernia     History of transfusion     LAST 2023 REPORTS NO TRANSFUSION REACTION POST SPINAL FUSION    Hyperlipidemia LDL goal <100 08/23/2021    Hypertension     Kidney stone     NO CURRENT ISSUES    Presence of biventricular implantable cardioverter-defibrillator (ICD) 08/23/2021    Sciatica     LEFT    Sleep apnea     Thyroid disorder     hypo    Wound of back     REPORTS HAS NOT HEALED SINCE BACK SURGERY AND IS FOLLOWED BY WOUND CARE     Past Surgical History:   Procedure Laterality Date    APPENDECTOMY      BLADDER SURGERY      REPAIR    CARDIAC DEFIBRILLATOR PLACEMENT      CATARACT EXTRACTION Bilateral     WITH LENS IMPLANT    CHOLECYSTECTOMY      COLONOSCOPY       GASTROPLASTY      FOR WEIGHT LOSS    HEEL SPUR SURGERY      HERNIA REPAIR      ABDOMINAL    HYSTERECTOMY      INGUINAL HERNIA REPAIR Bilateral 8/31/2022    Procedure: INGUINAL HERNIA REPAIR LAPAROSCOPIC WITH DAVINCI ROBOT, lysis of adhesions;  Surgeon: Lauri Celis MD;  Location: [facility] OR [facility] [place];  Service: Robotics - DaVinci;  Laterality: Bilateral;    KIDNEY STONE SURGERY      LUMBAR DISCECTOMY Left 1/27/2023    Procedure: MINIMALLY INVASIVE LUMBAR LATERAL RECESS DECOMPRESSION AND FORAMINOTOMY, LEFT APPROACH, LUMBAR 2-LUMBAR 3;  Surgeon: Ralf Rodriguez MD;  Location: [facility] MAIN OR;  Service: Neurosurgery;  Laterality: Left;    PACEMAKER IMPLANTATION      PACEMAKER REPLACEMENT N/A 11/28/2023    Procedure: PPM generator change - dual;  Surgeon: Michael Bernal MD;  Location: [facility] CATH INVASIVE LOCATION;  Service: Cardiovascular;  Laterality: N/A;    SPLENECTOMY      TRAUMATIC INJURY R/T GASTROPLASTY SURGERY    TONSILLECTOMY       Social History     Socioeconomic History    Marital status:    Tobacco Use    Smoking status: Never    Smokeless tobacco: Never   Vaping Use    Vaping Use: Never used   Substance and Sexual Activity    Alcohol use: Never    Drug use: Never    Sexual activity: Defer           Objective     Vitals:    12/21/23 1513   BP: 128/86   BP Location: Left arm   Patient Position: Sitting   Cuff Size: Adult   Pulse: 66   Resp: 18  Comment: 96% RA   Temp: 98.5 °F (36.9 °C)   TempSrc: Temporal   PainSc: 0-No pain       There is no height or weight on file to calculate BMI.    STEADI Fall Risk Assessment has not been completed.     Review of Systems     ROS:  Per HPI.      I have reviewed the HPI and ROS as documented by MA/RN. Arlette Rosales MD    Physical Exam     NAD  AAOx3, pleasant, cooperative  Small open wound dehiscence mid lower lumbar region without maceration, once again significantly smaller.  There is still extensive tunneling superiorly but the area is narrower and there is no  active drainage today.  Tunneled areas silver nitrated to try and promote healing. The tunneling inferiorly and to the left of midline continues to improve and fill in.  No erythema, heat, purulence, induration, or tenderness present.  No periwound inflammatory changes today.    See photos for details.              Result Review :  The following data was reviewed by: Arlette Rosales MD on 12/21/2023:    Prior notes and images.             Assessment and Plan   Diagnoses and all orders for this visit:    1. Dehiscence of operative wound, initial encounter (Primary)    2. History of spinal fusion    3. 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  We will have them stop packing the entirety of the wound and just use a length of the quarter inch packing strip inserted as a single layer part way up to function as a wick for any drainage and to keep the exterior opening open and continue to promote healing.  This should then be covered with a silicone bordered gauze dressing such as Mepilex or sterile dry gauze.  They should change this 1-2 times daily depending on the amount of drainage.    Patient should avoid lying on the wound or having any pressure on this area at all times to prevent excessive pressure or shear stress on the tissues.    Continue good glucose control and increase protein intake to aid healing.    Patient reports that her surgeon, Dr. Kurt Doyle of [facility], wanted me to call him after her visit today.  I left a message with his office but have not yet heard back.    Recheck 4 weeks.    Patient was given instructions and counseling regarding their condition or for health maintenance advice, as well as the wound care plan and recommendations. They understand and agree with the plan.   They will follow back up here in the clinic but are instructed to contact us in the interim should they have any new, returning, or worsening symptoms or concerns. Please see specific information pulled into the AVS if appropriate.     Dragon Dictation utilized for chart completion.    Follow Up   Return in about 4 weeks (around 1/18/2024).      Arlette Rosales MD

## 2023-12-29 ENCOUNTER — TELEPHONE (OUTPATIENT)
Dept: WOUND CARE | Facility: HOSPITAL | Age: 73
End: 2023-12-29
Payer: MEDICARE

## 2024-01-02 ENCOUNTER — TELEPHONE (OUTPATIENT)
Dept: FAMILY MEDICINE CLINIC | Facility: CLINIC | Age: 74
End: 2024-01-02

## 2024-01-02 NOTE — TELEPHONE ENCOUNTER
Caller: Deisy Lama    Relationship to patient: Self    Best call back number: [phone]     Patient is needing: PATIENT HAS A NEW PATIENT APPOINTMENT SET FOR 1.9.24 BUT PATIENT IS SEEN BY CARETENDERS AND THEY ARE REALLY PRESSING HER TO TRY AND GET IN WITH DR LESTER AS SOON AS POSSIBLE. PATIENT STATES THAT SHE IS OPEN TO ANY APPOINTMENT TIME AND LIVES CLOSE TO THE OFFICE AND WOULD LIKE TO SEE IF SHE CAN GET IN SOONER. PATIENTS WANTS DR LESTER TO KNOW HER SON IS ALSO A PATIENT OF HIS.

## 2024-01-05 ENCOUNTER — OFFICE VISIT (OUTPATIENT)
Dept: WOUND CARE | Facility: HOSPITAL | Age: 74
End: 2024-01-05
Payer: MEDICARE

## 2024-01-05 VITALS
DIASTOLIC BLOOD PRESSURE: 82 MMHG | TEMPERATURE: 97 F | RESPIRATION RATE: 16 BRPM | HEART RATE: 70 BPM | SYSTOLIC BLOOD PRESSURE: 138 MMHG

## 2024-01-05 DIAGNOSIS — E11.628 CONTROLLED TYPE 2 DIABETES MELLITUS WITH OTHER SKIN COMPLICATION, WITHOUT LONG-TERM CURRENT USE OF INSULIN: ICD-10-CM

## 2024-01-05 DIAGNOSIS — Z98.1 HISTORY OF SPINAL FUSION: Primary | ICD-10-CM

## 2024-01-05 DIAGNOSIS — T81.31XD DEHISCENCE OF OPERATIVE WOUND, SUBSEQUENT ENCOUNTER: ICD-10-CM

## 2024-01-05 PROCEDURE — G0463 HOSPITAL OUTPT CLINIC VISIT: HCPCS | Performed by: PHYSICIAN ASSISTANT

## 2024-01-05 RX ORDER — TRIAMCINOLONE ACETONIDE 1 MG/G
1 OINTMENT TOPICAL 2 TIMES DAILY
Qty: 30 G | Refills: 0 | Status: SHIPPED | OUTPATIENT
Start: 2024-01-05

## 2024-01-05 NOTE — PROGRESS NOTES
Chief Complaint  Wound Check (WOUND CHECK TO SPINE, PT STATES THE WOUND HAS AN ODOR TO IT SO SHE WANTED TO GET IT TO LOOKED AT. PT HAS NOTICED INCREASED BACK PAIN, AND BLOODY DRAINAGE. )    Subjective      History of Present Illness    Deisy Lama  is a 73 y.o. female here for lower back wound. She underwent extensive spinal fusion surgery in early September 2023.  She has posterior rods with bilateral pedicle screws extending from T9-S1 as well as bilateral iliac screws.  Midline spinal wound had sutures and glue and majority of the wound healed well but there is an area of wound dehiscence. The patient's surgeon referred her here for evaluation.  She says her back pain is improved since fusion.     Patient is a well-controlled diabetic. She is not a smoker.    Homehealth has been assisting with dressing changes. They have been packing the wound with iodoform gauze packing strips and changing it twice daily.  She says that since they started packing the wound with less packing that there has been increased drainage from the wound and that it is leaking onto her back. She also notes that she has noticed a slight increased odor to drainage. She notes that she was worried about a possible infection and wanted to come in to get the wound rechecked. The patient also notes an itchy rash around the wound where the dressings have been located.     Allergies:  Morphine, Dilaudid [hydromorphone], Latex, and Lisinopril      Current Outpatient Medications:     atorvastatin (LIPITOR) 10 MG tablet, Take 1 tablet by mouth Every Night., Disp: , Rfl:     brimonidine (ALPHAGAN) 0.2 % ophthalmic solution, Administer 1 drop to both eyes 2 (Two) Times a Day., Disp: , Rfl:     cephalexin (Keflex) 500 MG capsule, Take 1 capsule by mouth 3 (Three) Times a Day., Disp: 12 capsule, Rfl: 0    diclofenac (VOLTAREN) 75 MG EC tablet, Take 1 tablet by mouth 2 (Two) Times a Day., Disp: , Rfl:     dorzolamide (TRUSOPT) 2 % ophthalmic solution,  Administer 1 drop to both eyes 2 (Two) Times a Day., Disp: , Rfl:     escitalopram (LEXAPRO) 10 MG tablet, Take 1 tablet by mouth Daily., Disp: , Rfl:     glimepiride (AMARYL) 2 MG tablet, Take 1 tablet by mouth Every Morning Before Breakfast., Disp: , Rfl:     hydroCHLOROthiazide (HYDRODIURIL) 25 MG tablet, Take 1 tablet by mouth Daily., Disp: , Rfl:     latanoprost (XALATAN) 0.005 % ophthalmic solution, Administer 1 drop to both eyes Every Night., Disp: , Rfl:     levothyroxine (SYNTHROID, LEVOTHROID) 125 MCG tablet, Take 1 tablet by mouth Daily., Disp: , Rfl:     losartan (COZAAR) 100 MG tablet, Take 1 tablet by mouth Daily., Disp: , Rfl:     metoprolol succinate XL (TOPROL-XL) 50 MG 24 hr tablet, Take 1 tablet by mouth 2 (two) times a day., Disp: , Rfl:     omeprazole (priLOSEC) 40 MG capsule, Take 1 capsule by mouth Daily., Disp: , Rfl:     Restasis 0.05 % ophthalmic emulsion, Administer 1 drop to both eyes 2 (Two) Times a Day., Disp: , Rfl:     rOPINIRole (REQUIP) 4 MG tablet, Take 1 tablet by mouth Daily With Breakfast & Lunch., Disp: , Rfl:     rOPINIRole (REQUIP) 4 MG tablet, Take 2 tablets by mouth Every Night., Disp: , Rfl:     triamcinolone (KENALOG) 0.1 % ointment, Apply 1 application  topically to the appropriate area as directed 2 (Two) Times a Day., Disp: 30 g, Rfl: 0    Past Medical History:   Diagnosis Date    AAA (abdominal aortic aneurysm)     Pt reported/BEING MONITORED    Acid reflux     Arthritis     Blood disease     ABNORMAL PLATELET COUNT/CHRONIC ELEVATED WBC,NO LEUKEMIA    CHB (complete heart block)     PACEMAKER    Congestive heart failure (CHF)     MANAGED W/DIURETIC, NO RECENT PROBLEMS    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    Diabetes mellitus     AVERAGE AM BS 'S    Glaucoma     Hiatal hernia     History of transfusion     LAST 2023 REPORTS NO TRANSFUSION REACTION POST SPINAL FUSION    Hyperlipidemia LDL goal <100 08/23/2021    Hypertension     Kidney stone     NO CURRENT ISSUES     Presence of biventricular implantable cardioverter-defibrillator (ICD) 08/23/2021    Sciatica     LEFT    Sleep apnea     Thyroid disorder     hypo    Wound of back     REPORTS HAS NOT HEALED SINCE BACK SURGERY AND IS FOLLOWED BY WOUND CARE     Past Surgical History:   Procedure Laterality Date    APPENDECTOMY      BLADDER SURGERY      REPAIR    CARDIAC DEFIBRILLATOR PLACEMENT      CATARACT EXTRACTION Bilateral     WITH LENS IMPLANT    CHOLECYSTECTOMY      COLONOSCOPY      GASTROPLASTY      FOR WEIGHT LOSS    HEEL SPUR SURGERY      HERNIA REPAIR      ABDOMINAL    HYSTERECTOMY      INGUINAL HERNIA REPAIR Bilateral 8/31/2022    Procedure: INGUINAL HERNIA REPAIR LAPAROSCOPIC WITH [employer] ROB[employer], lysis of adhesions;  Surgeon: Lauri Celis MD;  Location: [facility] OR [facility] [place];  Service: Robotics - DaVinci;  Laterality: Bilateral;    KIDNEY STONE SURGERY      LUMBAR DISCECTOMY Left 1/27/2023    Procedure: MINIMALLY INVASIVE LUMBAR LATERAL RECESS DECOMPRESSION AND FORAMINOTOMY, LEFT APPROACH, LUMBAR 2-LUMBAR 3;  Surgeon: Ralf Rodriguez MD;  Location: [facility] MAIN OR;  Service: Neurosurgery;  Laterality: Left;    PACEMAKER IMPLANTATION      PACEMAKER REPLACEMENT N/A 11/28/2023    Procedure: PPM generator change - dual;  Surgeon: Michael Bernal MD;  Location: [facility] CATH INVASIVE LOCATION;  Service: Cardiovascular;  Laterality: N/A;    SPLENECTOMY      TRAUMATIC INJURY R/T GASTROPLASTY SURGERY    TONSILLECTOMY       Social History     Socioeconomic History    Marital status:    Tobacco Use    Smoking status: Never    Smokeless tobacco: Never   Vaping Use    Vaping Use: Never used   Substance and Sexual Activity    Alcohol use: Never    Drug use: Never    Sexual activity: Defer     Objective     Vitals:    01/05/24 1540   BP: 138/82   BP Location: Left arm   Patient Position: Sitting   Cuff Size: Adult   Pulse: 70   Resp: 16  Comment: 95% RA   Temp: 97 °F (36.1 °C)   TempSrc: Temporal   PainSc:   5   PainLoc: Back     Review  of Systems     ROS:  Per HPI.      I have reviewed the HPI and ROS as documented by MA/RN. Salma Spears PA-C    Physical Exam     NAD  AAOx3, pleasant, cooperative  Small open wound dehiscence mid lower lumbar region.  There is still extensive tunneling superiorly but the area is narrower.  The tunneling inferiorly and to the left of midline continues to improve and fill in. No erythema, heat, purulence, induration, or tenderness present. There is clear drainage from wound today. No odor to drainage today. There is rectangular eric-wound inflammation and a dry maculopapular rash where dressing has been surrounding wound.       Low back:  See photos for details.          Result Review :  The following data was reviewed by: Salma Spears PA-C on 01/05/2024:    Prior notes and images.         Assessment and Plan   Diagnoses and all orders for this visit:    1. History of spinal fusion (Primary)    2. 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 Dehiscence of operative wound, subsequent encounter    Other orders  -     triamcinolone (KENALOG) 0.1 % ointment; Apply 1 application  topically to the appropriate area as directed 2 (Two) Times a Day.  Dispense: 30 g; Refill: 0    Patient's wound is improving but there is still significant tunneling. No odor from wound today or signs of infection. Homehealth has been assisting with wound care and should continue this care plan. Due to increased drainage from wound, we will have them now pack a greater portion of the wound area to keep the exterior opening open and continue to promote healing.  This should help with drainage. This should then be covered with a silicone bordered gauze dressing such as Mepilex or sterile dry gauze.  They should change this 1-2 times daily depending on the amount of drainage. I will send in triamcinolone ointment to applied to excoriated rash around the wound,  once daily.     Patient should avoid lying on the wound or having any pressure on this area at all times to prevent excessive pressure or shear stress on the tissues.    Continue glucose control and protein intake to aid healing.    Recheck 5 weeks.    Patient was given instructions and counseling regarding their condition or for health maintenance advice, as well as the wound care plan and recommendations. They understand and agree with the plan.  They will follow back up here in the clinic but are instructed to contact us in the interim should they have any new, returning, or worsening symptoms or concerns. Please see specific information pulled into the AVS if appropriate.     Dragon Dictation utilized for chart completion.    Follow Up   Return in about 5 weeks (around 2/9/2024) for Recheck.      Salma Spears PA-C

## 2024-01-09 ENCOUNTER — OFFICE VISIT (OUTPATIENT)
Dept: FAMILY MEDICINE CLINIC | Facility: CLINIC | Age: 74
End: 2024-01-09
Payer: MEDICARE

## 2024-01-09 VITALS
DIASTOLIC BLOOD PRESSURE: 43 MMHG | RESPIRATION RATE: 18 BRPM | WEIGHT: 176.2 LBS | HEART RATE: 70 BPM | SYSTOLIC BLOOD PRESSURE: 91 MMHG | OXYGEN SATURATION: 100 % | TEMPERATURE: 97 F | HEIGHT: 65 IN | BODY MASS INDEX: 29.36 KG/M2

## 2024-01-09 DIAGNOSIS — E78.5 HYPERLIPIDEMIA LDL GOAL <100: ICD-10-CM

## 2024-01-09 DIAGNOSIS — Z95.810 PRESENCE OF BIVENTRICULAR IMPLANTABLE CARDIOVERTER-DEFIBRILLATOR (ICD): ICD-10-CM

## 2024-01-09 DIAGNOSIS — I50.22 CHRONIC HFREF (HEART FAILURE WITH REDUCED EJECTION FRACTION): ICD-10-CM

## 2024-01-09 DIAGNOSIS — Z00.00 ENCOUNTER FOR SUBSEQUENT ANNUAL WELLNESS VISIT (AWV) IN MEDICARE PATIENT: Primary | ICD-10-CM

## 2024-01-09 DIAGNOSIS — I10 ESSENTIAL HYPERTENSION: ICD-10-CM

## 2024-01-09 PROBLEM — M48.061 LUMBAR FORAMINAL STENOSIS: Status: ACTIVE | Noted: 2023-09-01

## 2024-01-09 PROBLEM — M41.50 DEGENERATIVE SCOLIOSIS IN ADULT PATIENT: Status: ACTIVE | Noted: 2023-08-09

## 2024-01-09 PROBLEM — Z98.1 S/P SPINAL FUSION: Status: ACTIVE | Noted: 2023-10-30

## 2024-01-09 RX ORDER — GLIMEPIRIDE 2 MG/1
2 TABLET ORAL
Qty: 90 TABLET | Refills: 1 | Status: CANCELLED | OUTPATIENT
Start: 2024-01-09

## 2024-01-09 RX ORDER — SPIRONOLACTONE 50 MG/1
50 TABLET, FILM COATED ORAL DAILY
Qty: 90 TABLET | Refills: 3 | Status: SHIPPED | OUTPATIENT
Start: 2024-01-09

## 2024-01-09 RX ORDER — OMEPRAZOLE 40 MG/1
40 CAPSULE, DELAYED RELEASE ORAL DAILY
Qty: 90 CAPSULE | Refills: 3 | Status: SHIPPED | OUTPATIENT
Start: 2024-01-09

## 2024-01-09 RX ORDER — FUROSEMIDE 40 MG/1
40 TABLET ORAL DAILY
COMMUNITY
End: 2024-01-09 | Stop reason: SDUPTHER

## 2024-01-09 RX ORDER — SPIRONOLACTONE 50 MG/1
50 TABLET, FILM COATED ORAL DAILY
COMMUNITY
End: 2024-01-09 | Stop reason: SDUPTHER

## 2024-01-09 RX ORDER — LEVOTHYROXINE SODIUM 0.12 MG/1
125 TABLET ORAL DAILY
Qty: 90 TABLET | Refills: 3 | Status: SHIPPED | OUTPATIENT
Start: 2024-01-09

## 2024-01-09 RX ORDER — LEVOCETIRIZINE DIHYDROCHLORIDE 5 MG/1
5 TABLET, FILM COATED ORAL EVERY EVENING
Qty: 30 TABLET | Refills: 2 | Status: SHIPPED | OUTPATIENT
Start: 2024-01-09

## 2024-01-09 RX ORDER — FUROSEMIDE 40 MG/1
40 TABLET ORAL DAILY
Qty: 90 TABLET | Refills: 3 | Status: SHIPPED | OUTPATIENT
Start: 2024-01-09

## 2024-01-09 RX ORDER — METOPROLOL SUCCINATE 50 MG/1
50 TABLET, EXTENDED RELEASE ORAL 2 TIMES DAILY WITH MEALS
Qty: 180 TABLET | Refills: 3 | Status: SHIPPED | OUTPATIENT
Start: 2024-01-09

## 2024-01-09 RX ORDER — ERGOCALCIFEROL 1.25 MG/1
1 CAPSULE ORAL WEEKLY
COMMUNITY
Start: 2023-11-27

## 2024-01-09 NOTE — PROGRESS NOTES
The ABCs of the Annual Wellness Visit  Subsequent Medicare Wellness Visit    Subjective    Deisy Lama is a 73 y.o. female who presents for a Subsequent Medicare Wellness Visit.    The following portions of the patient's history were reviewed and   updated as appropriate: allergies, current medications, past family history, past medical history, past social history, past surgical history, and problem list.    Compared to one year ago, the patient feels her physical   health is the same.    Compared to one year ago, the patient feels her mental   health is the same.    Recent Hospitalizations:  She was not admitted to the hospital during the last year.       Current Medical Providers:  Patient Care Team:  Sanchez Valenzuela DO as PCP - General (Family Medicine)  Marya Reina MD as Consulting Physician (General Surgery)  Lauri Celis MD as Consulting Physician (General Surgery)    Outpatient Medications Prior to Visit   Medication Sig Dispense Refill    atorvastatin (LIPITOR) 10 MG tablet Take 1 tablet by mouth Every Night.      brimonidine (ALPHAGAN) 0.2 % ophthalmic solution Administer 1 drop to both eyes 2 (Two) Times a Day.      cephalexin (Keflex) 500 MG capsule Take 1 capsule by mouth 3 (Three) Times a Day. 12 capsule 0    diclofenac (VOLTAREN) 75 MG EC tablet Take 1 tablet by mouth 2 (Two) Times a Day.      dorzolamide (TRUSOPT) 2 % ophthalmic solution Administer 1 drop to both eyes 2 (Two) Times a Day.      escitalopram (LEXAPRO) 10 MG tablet Take 1 tablet by mouth Daily.      glimepiride (AMARYL) 2 MG tablet Take 1 tablet by mouth Every Morning Before Breakfast.      hydroCHLOROthiazide (HYDRODIURIL) 25 MG tablet Take 1 tablet by mouth Daily.      latanoprost (XALATAN) 0.005 % ophthalmic solution Administer 1 drop to both eyes Every Night.      losartan (COZAAR) 100 MG tablet Take 1 tablet by mouth Daily.      Restasis 0.05 % ophthalmic emulsion Administer 1 drop to both eyes 2 (Two) Times a Day.       rOPINIRole (REQUIP) 4 MG tablet Take 1 tablet by mouth Daily With Breakfast & Lunch.      triamcinolone (KENALOG) 0.1 % ointment Apply 1 application  topically to the appropriate area as directed 2 (Two) Times a Day. 30 g 0    vitamin D (ERGOCALCIFEROL) 1.25 MG (42747 UT) capsule capsule Take 1 capsule by mouth 1 (One) Time Per Week.      furosemide (LASIX) 40 MG tablet Take 1 tablet by mouth Daily.      levothyroxine (SYNTHROID, LEVOTHROID) 125 MCG tablet Take 1 tablet by mouth Daily.      metoprolol succinate XL (TOPROL-XL) 50 MG 24 hr tablet Take 1 tablet by mouth 2 (two) times a day.      omeprazole (priLOSEC) 40 MG capsule Take 1 capsule by mouth Daily.      spironolactone (ALDACTONE) 50 MG tablet Take 1 tablet by mouth Daily.      rOPINIRole (REQUIP) 4 MG tablet Take 2 tablets by mouth Every Night. (Patient not taking: Reported on 1/9/2024)       No facility-administered medications prior to visit.       Opioid medication/s are on active medication list.  and I have evaluated her active treatment plan and pain score trends (see table).  Vitals:    01/09/24 0945   PainSc:   6   PainLoc: Back     I have reviewed the chart for potential of high risk medication and harmful drug interactions in the elderly.          Aspirin is not on active medication list.  Aspirin use is not indicated based on review of current medical condition/s. Risk of harm outweighs potential benefits.  .    Patient Active Problem List   Diagnosis    Chronic HFrEF nonischemic    Hyperlipidemia LDL goal <100    Essential hypertension    Presence of biventricular implantable cardioverter-defibrillator (ICD)    DDD (degenerative disc disease), lumbar    Lumbosacral spondylosis without myelopathy    Osteoarthritis of knee    Sebaceous cyst    Non-recurrent bilateral inguinal hernia without obstruction or gangrene    Chronic midline low back pain with left-sided sciatica    Thyroid disorder    Sciatica    Diabetic peripheral neuropathy     "Degenerative scoliosis in adult patient    S/P spinal fusion    Lumbar foraminal stenosis     Advance Care Planning   Advance Care Planning     Advance Directive is on file.  ACP discussion was declined by the patient. Patient has an advance directive in EMR which is still valid.      Objective    Vitals:    24 0945   BP: 91/43   BP Location: Left arm   Patient Position: Sitting   Cuff Size: Adult   Pulse: 70   Resp: 18   Temp: 97 °F (36.1 °C)   TempSrc: Temporal   SpO2: 100%   Weight: 79.9 kg (176 lb 3.2 oz)   Height: 165.1 cm (65\")   PainSc:   6   PainLoc: Back     Estimated body mass index is 29.32 kg/m² as calculated from the following:    Height as of this encounter: 165.1 cm (65\").    Weight as of this encounter: 79.9 kg (176 lb 3.2 oz).           Does the patient have evidence of cognitive impairment? No    Lab Results   Component Value Date    TRIG 111 2023    HDL 33 (L) 2023    LDL 48 2023    VLDL 21 2023    HGBA1C 6.30 (H) 2023        HEALTH RISK ASSESSMENT    Smoking Status:  Social History     Tobacco Use   Smoking Status Never   Smokeless Tobacco Never     Alcohol Consumption:  Social History     Substance and Sexual Activity   Alcohol Use Never     Fall Risk Screen:    ELMO Fall Risk Assessment was completed, and patient is at LOW risk for falls.Assessment completed on:2024    Depression Screenin/9/2024     9:52 AM   PHQ-2/PHQ-9 Depression Screening   Little Interest or Pleasure in Doing Things 0-->not at all   Feeling Down, Depressed or Hopeless 0-->not at all   PHQ-9: Brief Depression Severity Measure Score 0       Health Habits and Functional and Cognitive Screenin/9/2024     9:00 AM   Functional & Cognitive Status   Do you have difficulty preparing food and eating? No   Do you have difficulty bathing yourself, getting dressed or grooming yourself? No   Do you have difficulty using the toilet? No   Do you have difficulty moving around from " place to place? No   Do you have trouble with steps or getting out of a bed or a chair? No   Current Diet Well Balanced Diet   Dental Exam Up to date   Eye Exam Up to date   Exercise (times per week) 7 times per week   Current Exercises Include House Cleaning   Do you need help using the phone?  No   Are you deaf or do you have serious difficulty hearing?  No   Do you need help to go to places out of walking distance? No   Do you need help shopping? No   Do you need help preparing meals?  No   Do you need help with housework?  No   Do you need help with laundry? No   Do you need help taking your medications? No   Do you need help managing money? No   Do you ever drive or ride in a car without wearing a seat belt? No   Have you felt unusual stress, anger or loneliness in the last month? No   Who do you live with? Alone   If you need help, do you have trouble finding someone available to you? No   Have you been bothered in the last four weeks by sexual problems? No   Do you have difficulty concentrating, remembering or making decisions? No       Age-appropriate Screening Schedule:  Refer to the list below for future screening recommendations based on patient's age, sex and/or medical conditions. Orders for these recommended tests are listed in the plan section. The patient has been provided with a written plan.    Health Maintenance   Topic Date Due    DIABETIC FOOT EXAM  01/24/2024 (Originally 7/27/2021)    DIABETIC EYE EXAM  02/27/2024 (Originally 1/8/2022)    Pneumococcal Vaccine 65+ (3 of 3 - PPSV23 or PCV20) 03/14/2024 (Originally 12/11/2020)    HEPATITIS C SCREENING  01/09/2025 (Originally 7/27/2021)    TDAP/TD VACCINES (1 - Tdap) 01/09/2025 (Originally 4/30/1969)    BMI FOLLOWUP  05/09/2024    HEMOGLOBIN A1C  06/13/2024    LIPID PANEL  12/13/2024    URINE MICROALBUMIN  12/13/2024    ANNUAL WELLNESS VISIT  01/09/2025    DXA SCAN  06/21/2025    COLORECTAL CANCER SCREENING  07/20/2028    COVID-19 Vaccine   "Completed    INFLUENZA VACCINE  Completed    ZOSTER VACCINE  Completed                  CMS Preventative Services Quick Reference  Risk Factors Identified During Encounter  reviewed  The above risks/problems have been discussed with the patient.  Pertinent information has been shared with the patient in the After Visit Summary.  An After Visit Summary and PPPS were made available to the patient.    Follow Up:   Next Medicare Wellness visit to be scheduled in 1 year.       Additional E&M Note during same encounter follows:  Patient has multiple medical problems which are significant and separately identifiable that require additional work above and beyond the Medicare Wellness Visit.      Chief Complaint  Establish Care, Back Pain (Had surgery on 09/01/2023, has wound care daily.), and Medicare Wellness-subsequent (No concerns.)    Subjective        HPI  Deisy Lama is also being seen today for AWV    Patient is establishing care needs refill on medications blood pressure slightly low recommended monitoring at home and consider adjusting medications if indicated such as blood pressure medicines she has a wound on her back from surgery healing appropriately but will take time to heal completely           Objective   Vital Signs:  BP 91/43 (BP Location: Left arm, Patient Position: Sitting, Cuff Size: Adult)   Pulse 70   Temp 97 °F (36.1 °C) (Temporal)   Resp 18   Ht 165.1 cm (65\")   Wt 79.9 kg (176 lb 3.2 oz)   SpO2 100%   BMI 29.32 kg/m²     Physical Exam  Vitals reviewed.   Constitutional:       Appearance: Normal appearance. She is well-developed.   HENT:      Head: Normocephalic and atraumatic.      Right Ear: External ear normal.      Left Ear: External ear normal.      Nose: Nose normal.   Eyes:      Conjunctiva/sclera: Conjunctivae normal.      Pupils: Pupils are equal, round, and reactive to light.   Cardiovascular:      Rate and Rhythm: Normal rate.   Pulmonary:      Effort: Pulmonary effort is " normal.      Breath sounds: Normal breath sounds.      Comments: cough  Abdominal:      General: There is no distension.   Skin:     General: Skin is warm and dry.             Comments: Healing area status post spinal surgery, appropriate going to wound care, dressed no signs of infection or concern   Neurological:      Mental Status: She is alert and oriented to person, place, and time. Mental status is at baseline.      Motor: Weakness present.   Psychiatric:         Mood and Affect: Mood and affect normal.         Behavior: Behavior normal.         Thought Content: Thought content normal.         Judgment: Judgment normal.          The following data was reviewed by: Sanchez Valenzuela DO on 01/09/2024:  Common labs          11/27/2023    13:40 12/13/2023    11:50 1/11/2024    14:42   Common Labs   Glucose 57  129  133    BUN 30  48  49    Creatinine 1.39  1.95  1.80    Sodium 140  142  144    Potassium 4.2  4.3  5.4    Chloride 107  109  111    Calcium 9.6  9.3  9.3    Albumin  3.4  3.6    Total Bilirubin  0.2  0.3    Alkaline Phosphatase  120  106    AST (SGOT)  22  25    ALT (SGPT)  25  32    WBC 11.57  10.25  14.70    Hemoglobin 12.1  11.9  13.0    Hematocrit 36.6  39.8  42.1    Platelets 521  491  591    Total Cholesterol  102     Triglycerides  111     HDL Cholesterol  33     LDL Cholesterol   48     Hemoglobin A1C  6.30     Microalbumin, Urine  <1.2                  Assessment and Plan   Diagnoses and all orders for this visit:    1. Encounter for subsequent annual wellness visit (AWV) in Medicare patient (Primary)    2. Chronic HFrEF nonischemic  -     BNP    3. Essential hypertension  -     CBC (No Diff); Future  -     Comprehensive metabolic panel; Future  -     TSH+Free T4; Future  -     Lipid panel; Future    4. Hyperlipidemia LDL goal <100  -     CBC (No Diff); Future  -     Comprehensive metabolic panel; Future  -     TSH+Free T4; Future  -     Lipid panel; Future    5. Presence of biventricular  implantable cardioverter-defibrillator (ICD)    Other orders  -     furosemide (LASIX) 40 MG tablet; Take 1 tablet by mouth Daily.  Dispense: 90 tablet; Refill: 3  -     spironolactone (ALDACTONE) 50 MG tablet; Take 1 tablet by mouth Daily.  Dispense: 90 tablet; Refill: 3  -     levothyroxine (SYNTHROID, LEVOTHROID) 125 MCG tablet; Take 1 tablet by mouth Daily.  Dispense: 90 tablet; Refill: 3  -     metoprolol succinate XL (TOPROL-XL) 50 MG 24 hr tablet; Take 1 tablet by mouth 2 (Two) Times a Day With Meals.  Dispense: 180 tablet; Refill: 3  -     omeprazole (priLOSEC) 40 MG capsule; Take 1 capsule by mouth Daily.  Dispense: 90 capsule; Refill: 3  -     levocetirizine (XYZAL) 5 MG tablet; Take 1 tablet by mouth Every Evening.  Dispense: 30 tablet; Refill: 2      Reviewed AWV recommendations and ordered as appropriate, follow up annually for review, sooner if concerns    No depression, falls, dementia symptoms or other  Risk and home safety assessment good    Followed up on chronic conditions and ordered refills, appropriate monitoring labs additionally as needed every 6 months or sooner if indicated, controlled stable    Follow up at least every 3-6 months for chronic conditions and as scheduled with appropriate specialists as indicated otherwise    Monitor blood pressure at home reviewed medications consider adjusting and holding medicines to improve blood pressure if low and causes symptoms or issues    Continue with wound care and  specialist otherwise as scheduled    Check blood sugar at home can consider holding Amaryl having issues with hypoglycemia continue thyroid medicine as prescribed recheck TSH T4 every 6 months           Follow Up   Return in about 3 months (around 4/9/2024) for Next scheduled follow up, Recheck in a month or after labs if symptoms continue worse or no better.  Patient was given instructions and counseling regarding her condition or for health maintenance advice. Please see specific  information pulled into the AVS if appropriate.

## 2024-01-10 ENCOUNTER — PATIENT ROUNDING (BHMG ONLY) (OUTPATIENT)
Dept: FAMILY MEDICINE CLINIC | Facility: CLINIC | Age: 74
End: 2024-01-10
Payer: MEDICARE

## 2024-01-10 NOTE — PROGRESS NOTES
A My-Chart message has been sent to the patient for PATIENT ROUNDING with [facility] [place].

## 2024-01-11 ENCOUNTER — APPOINTMENT (OUTPATIENT)
Dept: GENERAL RADIOLOGY | Facility: HOSPITAL | Age: 74
End: 2024-01-11
Payer: MEDICARE

## 2024-01-11 ENCOUNTER — HOSPITAL ENCOUNTER (EMERGENCY)
Facility: HOSPITAL | Age: 74
Discharge: HOME | End: 2024-01-11
Attending: EMERGENCY MEDICINE
Payer: MEDICARE

## 2024-01-11 ENCOUNTER — APPOINTMENT (OUTPATIENT)
Dept: CT IMAGING | Facility: HOSPITAL | Age: 74
End: 2024-01-11
Payer: MEDICARE

## 2024-01-11 VITALS
RESPIRATION RATE: 16 BRPM | TEMPERATURE: 97.5 F | BODY MASS INDEX: 30.24 KG/M2 | OXYGEN SATURATION: 96 % | SYSTOLIC BLOOD PRESSURE: 125 MMHG | HEART RATE: 75 BPM | DIASTOLIC BLOOD PRESSURE: 85 MMHG | HEIGHT: 64 IN

## 2024-01-11 DIAGNOSIS — S22.060A CLOSED WEDGE COMPRESSION FRACTURE OF T7 VERTEBRA, INITIAL ENCOUNTER: Primary | ICD-10-CM

## 2024-01-11 DIAGNOSIS — E16.2 HYPOGLYCEMIA: ICD-10-CM

## 2024-01-11 LAB
ALBUMIN SERPL-MCNC: 3.6 G/DL (ref 3.5–5.2)
ALBUMIN/GLOB SERPL: 1.1 G/DL
ALP SERPL-CCNC: 106 U/L (ref 39–117)
ALT SERPL W P-5'-P-CCNC: 32 U/L (ref 1–33)
ANION GAP SERPL CALCULATED.3IONS-SCNC: 11.2 MMOL/L (ref 5–15)
AST SERPL-CCNC: 25 U/L (ref 1–32)
BASOPHILS # BLD AUTO: 0.09 10*3/MM3 (ref 0–0.2)
BASOPHILS NFR BLD AUTO: 0.6 % (ref 0–1.5)
BILIRUB SERPL-MCNC: 0.3 MG/DL (ref 0–1.2)
BILIRUB UR QL STRIP: NEGATIVE
BUN SERPL-MCNC: 49 MG/DL (ref 8–23)
BUN/CREAT SERPL: 27.2 (ref 7–25)
CALCIUM SPEC-SCNC: 9.3 MG/DL (ref 8.6–10.5)
CHLORIDE SERPL-SCNC: 111 MMOL/L (ref 98–107)
CLARITY UR: CLEAR
CO2 SERPL-SCNC: 21.8 MMOL/L (ref 22–29)
COLOR UR: YELLOW
CREAT SERPL-MCNC: 1.8 MG/DL (ref 0.57–1)
DEPRECATED RDW RBC AUTO: 46.7 FL (ref 37–54)
EGFRCR SERPLBLD CKD-EPI 2021: 29.1 ML/MIN/1.73
EOSINOPHIL # BLD AUTO: 0.22 10*3/MM3 (ref 0–0.4)
EOSINOPHIL NFR BLD AUTO: 1.5 % (ref 0.3–6.2)
ERYTHROCYTE [DISTWIDTH] IN BLOOD BY AUTOMATED COUNT: 13.9 % (ref 12.3–15.4)
FLUAV SUBTYP SPEC NAA+PROBE: NOT DETECTED
FLUBV RNA ISLT QL NAA+PROBE: NOT DETECTED
GLOBULIN UR ELPH-MCNC: 3.2 GM/DL
GLUCOSE BLDC GLUCOMTR-MCNC: 129 MG/DL (ref 70–99)
GLUCOSE BLDC GLUCOMTR-MCNC: 50 MG/DL (ref 70–99)
GLUCOSE BLDC GLUCOMTR-MCNC: 52 MG/DL (ref 70–99)
GLUCOSE BLDC GLUCOMTR-MCNC: 77 MG/DL (ref 70–99)
GLUCOSE SERPL-MCNC: 133 MG/DL (ref 65–99)
GLUCOSE UR STRIP-MCNC: NEGATIVE MG/DL
HCT VFR BLD AUTO: 42.1 % (ref 34–46.6)
HGB BLD-MCNC: 13 G/DL (ref 12–15.9)
HGB UR QL STRIP.AUTO: NEGATIVE
HOLD SPECIMEN: NORMAL
HOLD SPECIMEN: NORMAL
IMM GRANULOCYTES # BLD AUTO: 0.12 10*3/MM3 (ref 0–0.05)
IMM GRANULOCYTES NFR BLD AUTO: 0.8 % (ref 0–0.5)
INR PPP: 1 (ref 0.86–1.15)
KETONES UR QL STRIP: NEGATIVE
LEUKOCYTE ESTERASE UR QL STRIP.AUTO: NEGATIVE
LYMPHOCYTES # BLD AUTO: 2.1 10*3/MM3 (ref 0.7–3.1)
LYMPHOCYTES NFR BLD AUTO: 14.3 % (ref 19.6–45.3)
MAGNESIUM SERPL-MCNC: 2.3 MG/DL (ref 1.6–2.4)
MCH RBC QN AUTO: 28.4 PG (ref 26.6–33)
MCHC RBC AUTO-ENTMCNC: 30.9 G/DL (ref 31.5–35.7)
MCV RBC AUTO: 91.9 FL (ref 79–97)
MONOCYTES # BLD AUTO: 0.93 10*3/MM3 (ref 0.1–0.9)
MONOCYTES NFR BLD AUTO: 6.3 % (ref 5–12)
NEUTROPHILS NFR BLD AUTO: 11.24 10*3/MM3 (ref 1.7–7)
NEUTROPHILS NFR BLD AUTO: 76.5 % (ref 42.7–76)
NITRITE UR QL STRIP: NEGATIVE
NRBC BLD AUTO-RTO: 0 /100 WBC (ref 0–0.2)
PH UR STRIP.AUTO: 5.5 [PH] (ref 5–8)
PLATELET # BLD AUTO: 591 10*3/MM3 (ref 140–450)
PMV BLD AUTO: 9.4 FL (ref 6–12)
POTASSIUM SERPL-SCNC: 5.4 MMOL/L (ref 3.5–5.2)
PROT SERPL-MCNC: 6.8 G/DL (ref 6–8.5)
PROT UR QL STRIP: ABNORMAL
PROTHROMBIN TIME: 13.4 SECONDS (ref 11.8–14.9)
QT INTERVAL: 484 MS
QTC INTERVAL: 492 MS
RBC # BLD AUTO: 4.58 10*6/MM3 (ref 3.77–5.28)
RSV RNA NPH QL NAA+NON-PROBE: NOT DETECTED
SARS-COV-2 RNA RESP QL NAA+PROBE: NOT DETECTED
SODIUM SERPL-SCNC: 144 MMOL/L (ref 136–145)
SP GR UR STRIP: 1.02 (ref 1–1.03)
UROBILINOGEN UR QL STRIP: ABNORMAL
WBC NRBC COR # BLD AUTO: 14.7 10*3/MM3 (ref 3.4–10.8)
WHOLE BLOOD HOLD COAG: NORMAL
WHOLE BLOOD HOLD SPECIMEN: NORMAL

## 2024-01-11 PROCEDURE — 85610 PROTHROMBIN TIME: CPT

## 2024-01-11 PROCEDURE — 87637 SARSCOV2&INF A&B&RSV AMP PRB: CPT | Performed by: EMERGENCY MEDICINE

## 2024-01-11 PROCEDURE — 93005 ELECTROCARDIOGRAM TRACING: CPT | Performed by: EMERGENCY MEDICINE

## 2024-01-11 PROCEDURE — 70450 CT HEAD/BRAIN W/O DYE: CPT

## 2024-01-11 PROCEDURE — 72125 CT NECK SPINE W/O DYE: CPT

## 2024-01-11 PROCEDURE — 82948 REAGENT STRIP/BLOOD GLUCOSE: CPT

## 2024-01-11 PROCEDURE — 36415 COLL VENOUS BLD VENIPUNCTURE: CPT

## 2024-01-11 PROCEDURE — 73030 X-RAY EXAM OF SHOULDER: CPT

## 2024-01-11 PROCEDURE — 99284 EMERGENCY DEPT VISIT MOD MDM: CPT

## 2024-01-11 PROCEDURE — 85025 COMPLETE CBC W/AUTO DIFF WBC: CPT

## 2024-01-11 PROCEDURE — 72100 X-RAY EXAM L-S SPINE 2/3 VWS: CPT

## 2024-01-11 PROCEDURE — 63710000001 ONDANSETRON ODT 4 MG TABLET DISPERSIBLE: Performed by: EMERGENCY MEDICINE

## 2024-01-11 PROCEDURE — 81003 URINALYSIS AUTO W/O SCOPE: CPT

## 2024-01-11 PROCEDURE — 80053 COMPREHEN METABOLIC PANEL: CPT

## 2024-01-11 PROCEDURE — 83735 ASSAY OF MAGNESIUM: CPT

## 2024-01-11 PROCEDURE — 72072 X-RAY EXAM THORAC SPINE 3VWS: CPT

## 2024-01-11 PROCEDURE — 93005 ELECTROCARDIOGRAM TRACING: CPT

## 2024-01-11 RX ORDER — ONDANSETRON 4 MG/1
4 TABLET, ORALLY DISINTEGRATING ORAL ONCE
Status: COMPLETED | OUTPATIENT
Start: 2024-01-11 | End: 2024-01-11

## 2024-01-11 RX ORDER — ONDANSETRON 4 MG/1
4 TABLET, ORALLY DISINTEGRATING ORAL EVERY 6 HOURS PRN
Qty: 15 TABLET | Refills: 0 | Status: SHIPPED | OUTPATIENT
Start: 2024-01-11

## 2024-01-11 RX ORDER — OXYCODONE HYDROCHLORIDE AND ACETAMINOPHEN 5; 325 MG/1; MG/1
1 TABLET ORAL ONCE
Status: COMPLETED | OUTPATIENT
Start: 2024-01-11 | End: 2024-01-11

## 2024-01-11 RX ORDER — OXYCODONE HYDROCHLORIDE AND ACETAMINOPHEN 5; 325 MG/1; MG/1
1 TABLET ORAL EVERY 6 HOURS PRN
Qty: 12 TABLET | Refills: 0 | Status: SHIPPED | OUTPATIENT
Start: 2024-01-11

## 2024-01-11 RX ADMIN — ONDANSETRON 4 MG: 4 TABLET, ORALLY DISINTEGRATING ORAL at 17:41

## 2024-01-11 RX ADMIN — OXYCODONE HYDROCHLORIDE AND ACETAMINOPHEN 1 TABLET: 5; 325 TABLET ORAL at 17:41

## 2024-01-11 NOTE — DISCHARGE INSTRUCTIONS
Return to emergency department immediately for any one-sided loss of strength to either leg, loss of bowel/bladder control.  Stay well-hydrated and make sure you are getting good oral intake so that your sugar does not drop again.

## 2024-01-11 NOTE — ED PROVIDER NOTES
Time: 2:33 PM EST  Date of encounter:  1/11/2024  Independent Historian/Clinical History and Information was obtained by:   Patient and Family    History is limited by: N/A    Chief Complaint   Patient presents with    Hypoglycemia    Back Pain    Wound Check         History of Present Illness:  Patient is a 73 y.o. year old female who presents to the emergency department for evaluation of fall, hypoglycemia.  Patient states that her blood sugar yesterday was in the 40s and prior to arrival today was 68 and she drank a Coke on the way here.  She states that sometime last night she fell out of her bed but does not remember this instance, states that she woke up on the floor at 1 p.m this afternoon.  Patient is complaining of posterior head pain, neck pain, right shoulder pain, back pain.  She states that she has been seeing wound care for incision on her back that is being packed and would like this wound evaluated as well.  (Provider in triage, Dimitry García PA-C)  Patient denies any unilateral leg weakness, bowel/bladder incontinence.  Denies any recent illness in the past 48 hours, however notes she has been eating and drinking almost nothing due to a GI bug she got over the weekend after eating [employer].    Patient Care Team  Primary Care Provider: Sanchez Valenzuela DO    Past Medical History:     Allergies   Allergen Reactions    Morphine Itching    Dilaudid [Hydromorphone] Confusion    Latex Itching    Lisinopril Cough     Past Medical History:   Diagnosis Date    AAA (abdominal aortic aneurysm)     Pt reported/BEING MONITORED    Acid reflux     Arthritis     Blood disease     ABNORMAL PLATELET COUNT/CHRONIC ELEVATED WBC,NO LEUKEMIA    CHB (complete heart block)     PACEMAKER    Congestive heart failure (CHF)     MANAGED W/DIURETIC, NO RECENT PROBLEMS    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    Diabetes mellitus     AVERAGE AM BS 'S    Glaucoma     Hiatal hernia     History of transfusion     LAST 2023 REPORTS NO  TRANSFUSION REACTION POST SPINAL FUSION    Hyperlipidemia LDL goal <100 08/23/2021    Hypertension     Kidney stone     NO CURRENT ISSUES    Presence of biventricular implantable cardioverter-defibrillator (ICD) 08/23/2021    Sciatica     LEFT    Sleep apnea     Thyroid disorder     hypo    Wound of back     REPORTS HAS NOT HEALED SINCE BACK SURGERY AND IS FOLLOWED BY WOUND CARE     Past Surgical History:   Procedure Laterality Date    APPENDECTOMY      BLADDER SURGERY      REPAIR    CARDIAC DEFIBRILLATOR PLACEMENT      CATARACT EXTRACTION Bilateral     WITH LENS IMPLANT    CHOLECYSTECTOMY      COLONOSCOPY      GASTROPLASTY      FOR WEIGHT LOSS    HEEL SPUR SURGERY      HERNIA REPAIR      ABDOMINAL    HYSTERECTOMY      INGUINAL HERNIA REPAIR Bilateral 8/31/2022    Procedure: INGUINAL HERNIA REPAIR LAPAROSCOPIC WITH [employer], lysis of adhesions;  Surgeon: Lauri Celis MD;  [facility];  Service: Robotics - DaVinci;  Laterality: Bilateral;    KIDNEY STONE SURGERY      LUMBAR DISCECTOMY Left 1/27/2023    Procedure: MINIMALLY INVASIVE LUMBAR LATERAL RECESS DECOMPRESSION AND FORAMINOTOMY, LEFT APPROACH, LUMBAR 2-LUMBAR 3;  Surgeon: Ralf Rodriguez MD;  Location: [facility] MAIN OR;  Service: Neurosurgery;  Laterality: Left;    PACEMAKER IMPLANTATION      PACEMAKER REPLACEMENT N/A 11/28/2023    Procedure: PPM generator change - dual;  Surgeon: Michael Bernal MD;  Location: [facility] CATH INVASIVE LOCATION;  Service: Cardiovascular;  Laterality: N/A;    SPLENECTOMY      TRAUMATIC INJURY R/T GASTROPLASTY SURGERY    TONSILLECTOMY       Family History   Problem Relation Age of Onset    Stroke Mother     Diabetes Mother     Arthritis Mother     Osteoporosis Mother     Heart disease Father     Cancer Father         PROSTATE    Arthritis Father     Osteoporosis Father     Diabetes Brother     Cancer Son     Malig Hyperthermia Neg Hx        Home Medications:  Prior to Admission medications    Medication Sig Start  Date End Date Taking? Authorizing Provider   atorvastatin (LIPITOR) 10 MG tablet Take 1 tablet by mouth Every Night. 7/20/21   Polina Falk MD   brimonidine (ALPHAGAN) 0.2 % ophthalmic solution Administer 1 drop to both eyes 2 (Two) Times a Day. 11/1/21   Polina Falk MD   cephalexin (Keflex) 500 MG capsule Take 1 capsule by mouth 3 (Three) Times a Day. 11/28/23   Michael Bernal MD   diclofenac (VOLTAREN) 75 MG EC tablet Take 1 tablet by mouth 2 (Two) Times a Day. 8/6/21   Polina Falk MD   dorzolamide (TRUSOPT) 2 % ophthalmic solution Administer 1 drop to both eyes 2 (Two) Times a Day. 11/12/21   Polina Falk MD   escitalopram (LEXAPRO) 10 MG tablet Take 1 tablet by mouth Daily.    Polina Falk MD   furosemide (LASIX) 40 MG tablet Take 1 tablet by mouth Daily. 1/9/24   Sanchez Valenzuela DO   glimepiride (AMARYL) 2 MG tablet Take 1 tablet by mouth Every Morning Before Breakfast. 10/8/21   Polina Falk MD   hydroCHLOROthiazide (HYDRODIURIL) 25 MG tablet Take 1 tablet by mouth Daily. 5/3/21   Polina Falk MD   latanoprost (XALATAN) 0.005 % ophthalmic solution Administer 1 drop to both eyes Every Night.    Polina Falk MD   levocetirizine (XYZAL) 5 MG tablet Take 1 tablet by mouth Every Evening. 1/9/24   Sanchez Valenzuela DO   levothyroxine (SYNTHROID, LEVOTHROID) 125 MCG tablet Take 1 tablet by mouth Daily. 1/9/24   Sanchez Valenzuela DO   losartan (COZAAR) 100 MG tablet Take 1 tablet by mouth Daily. 7/14/21   Polina Falk MD   metoprolol succinate XL (TOPROL-XL) 50 MG 24 hr tablet Take 1 tablet by mouth 2 (Two) Times a Day With Meals. 1/9/24   Sanchez Valenzuela DO   omeprazole (priLOSEC) 40 MG capsule Take 1 capsule by mouth Daily. 1/9/24   Sanchez Valenzuela DO   Restasis 0.05 % ophthalmic emulsion Administer 1 drop to both eyes 2 (Two) Times a Day. 11/10/23   Polina Falk MD   rOPINIRole (REQUIP) 4 MG tablet Take 1 tablet by mouth Daily With  "Breakfast & Lunch. 11/4/21   Polina Falk MD   rOPINIRole (REQUIP) 4 MG tablet Take 2 tablets by mouth Every Night.  Patient not taking: Reported on 1/9/2024    Polina Falk MD   spironolactone (ALDACTONE) 50 MG tablet Take 1 tablet by mouth Daily. 1/9/24   Sanchez Valenzuela DO   triamcinolone (KENALOG) 0.1 % ointment Apply 1 application  topically to the appropriate area as directed 2 (Two) Times a Day. 1/5/24   Salma Davidson PA-C   vitamin D (ERGOCALCIFEROL) 1.25 MG (26378 UT) capsule capsule Take 1 capsule by mouth 1 (One) Time Per Week. 11/27/23   Polina Falk MD        Social History:   Social History     Tobacco Use    Smoking status: Never    Smokeless tobacco: Never   Vaping Use    Vaping Use: Never used   Substance Use Topics    Alcohol use: Never    Drug use: Never         Review of Systems:  Review of Systems   Constitutional:  Positive for fatigue. Negative for chills and fever.   HENT:  Negative for congestion, rhinorrhea and sore throat.    Eyes:  Negative for photophobia.   Respiratory:  Negative for apnea, cough, chest tightness and shortness of breath.    Cardiovascular:  Negative for chest pain and palpitations.   Gastrointestinal:  Positive for diarrhea, nausea and vomiting. Negative for abdominal pain.   Endocrine: Negative.    Genitourinary:  Negative for difficulty urinating and dysuria.   Musculoskeletal:  Positive for arthralgias and back pain. Negative for joint swelling and myalgias.   Skin:  Negative for color change and wound.   Allergic/Immunologic: Negative.    Neurological:  Positive for weakness and headaches. Negative for seizures.   Psychiatric/Behavioral: Negative.     All other systems reviewed and are negative.       Physical Exam:  /85 (BP Location: Left arm, Patient Position: Sitting)   Pulse 75   Temp 97.5 °F (36.4 °C) (Oral)   Resp 16   Ht 162.6 cm (64\")   SpO2 96%   BMI 30.24 kg/m²         Physical Exam  Vitals and nursing note " reviewed.   Constitutional:       General: She is awake.      Appearance: Normal appearance.   HENT:      Head: Normocephalic and atraumatic.      Nose: Nose normal.      Mouth/Throat:      Mouth: Mucous membranes are moist.   Eyes:      Extraocular Movements: Extraocular movements intact.      Pupils: Pupils are equal, round, and reactive to light.   Cardiovascular:      Rate and Rhythm: Normal rate and regular rhythm.      Heart sounds: Normal heart sounds.   Pulmonary:      Effort: Pulmonary effort is normal. No respiratory distress.      Breath sounds: Normal breath sounds. No wheezing, rhonchi or rales.   Abdominal:      General: Bowel sounds are normal. There is no distension.      Palpations: Abdomen is soft.      Tenderness: There is no abdominal tenderness. There is no guarding or rebound.      Comments: No rigidity   Musculoskeletal:         General: No tenderness. Normal range of motion.      Cervical back: Normal range of motion and neck supple.   Skin:     General: Skin is warm and dry.      Coloration: Skin is not jaundiced.   Neurological:      General: No focal deficit present.      Mental Status: She is alert and oriented to person, place, and time. Mental status is at baseline.   Psychiatric:         Mood and Affect: Mood normal.         Behavior: Behavior normal.                      Procedures:  Procedures      Medical Decision Making:      Comorbidities that affect care:    Hypertension, CHF, hyperlipidemia, diabetes, lumbar spine surgery, AAA    External Notes reviewed:    Previous Clinic Note: Office visit with family medicine 1/9/2024.  Description: Chronic CHF, nonischemic      The following orders were placed and all results were independently analyzed by me:  Orders Placed This Encounter   Procedures    [facility] 14.  Spine Brace TLSO    COVID-19, FLU A/B, RSV PCR 1 HR TAT - Swab, Nasopharynx    CT Head Without Contrast    CT Cervical Spine Without Contrast    XR Spine Thoracic  3 View    XR Spine Lumbar 2 or 3 View    XR Shoulder 2+ View Right    Comprehensive Metabolic Panel    [facility] [place] Draw    Urinalysis With Microscopic If Indicated (No Culture) - Urine, Clean Catch    Magnesium    Protime-INR    CBC Auto Differential    Ambulatory Referral to Neurosurgery (All except [place])    Obtain & Apply The Following- Back/Torso; Back brace (hard)    Repeat POCT glucose at 1915  Nursing Communication    POC Glucose Once    POC Glucose STAT    POC Glucose Once    POC Glucose Once    POC Glucose Once    ECG 12 Lead Syncope    CBC & Differential    Green Top (Gel)    Lavender Top    Gold Top - SST    Light Blue Top       Medications Given in the Emergency Department:  Medications   oxyCODONE-acetaminophen (PERCOCET) 5-325 MG per tablet 1 tablet (1 tablet Oral Given 1/11/24 1741)   ondansetron ODT (ZOFRAN-ODT) disintegrating tablet 4 mg (4 mg Oral Given 1/11/24 1741)        ED Course:    The patient was initially evaluated in the triage area where orders were placed. The patient was later dispositioned by Castro Lloyd MD.      The patient was advised to stay for completion of workup which includes but is not limited to communication of labs and radiological results, reassessment and plan. The patient was advised that leaving prior to disposition by a provider could result in critical findings that are not communicated to the patient.     ED Course as of 01/11/24 2051 Thu Jan 11, 2024   1435 PROVIDER IN TRIAGE  Patient was evaluated by me in triage, Dimitry García PA-C.  Orders were placed and patient is currently awaiting final results and disposition.  [MD]   3094 I have personally interpreted the EKG today and it shows no evidence of any acute ischemia or heart arrhythmia.  Paced rhythm with nonspecific ST segment changes unchanged from 11/27/2023 [RP]      ED Course User Index  [MD] Dimitry García PA-C  [RP] Castro Lloyd MD       Labs:    Lab Results (last 24 hours)       Procedure  Component Value Units Date/Time    POC Glucose Once [[id number]]  (Abnormal) Collected: 01/11/24 1441    Specimen: Blood Updated: 01/11/24 1442     Glucose 129 mg/dL      Comment: Serial Number: [id number]:  [id number]       CBC & Differential [[id number]]  (Abnormal) Collected: 01/11/24 1442    Specimen: Blood from Arm, Left Updated: 01/11/24 1456    Narrative:      The following orders were created for panel order CBC & Differential.  Procedure                               Abnormality         Status                     ---------                               -----------         ------                     CBC Auto Differential[[id number]]        Abnormal            Final result                 Please view results for these tests on the individual orders.    Comprehensive Metabolic Panel [[id number]]  (Abnormal) Collected: 01/11/24 1442    Specimen: Blood from Arm, Left Updated: 01/11/24 1516     Glucose 133 mg/dL      BUN 49 mg/dL      Creatinine 1.80 mg/dL      Sodium 144 mmol/L      Potassium 5.4 mmol/L      Chloride 111 mmol/L      CO2 21.8 mmol/L      Calcium 9.3 mg/dL      Total Protein 6.8 g/dL      Albumin 3.6 g/dL      ALT (SGPT) 32 U/L      AST (SGOT) 25 U/L      Alkaline Phosphatase 106 U/L      Total Bilirubin 0.3 mg/dL      Globulin 3.2 gm/dL      A/G Ratio 1.1 g/dL      BUN/Creatinine Ratio 27.2     Anion Gap 11.2 mmol/L      eGFR 29.1 mL/min/1.73     Narrative:      GFR Normal >60  Chronic Kidney Disease <60  Kidney Failure <15    The GFR formula is only valid for adults with stable renal function between ages 18 and 70.    Magnesium [[id number]]  (Normal) Collected: 01/11/24 1442    Specimen: Blood from Arm, Left Updated: 01/11/24 1516     Magnesium 2.3 mg/dL     Protime-INR [[id number]]  (Normal) Collected: 01/11/24 1442    Specimen: Blood from Arm, Left Updated: 01/11/24 1508     Protime 13.4 Seconds      INR 1.00    Narrative:      Suggested Therapeutic Ranges For Oral Anticoagulant  Therapy:  Level of Therapy                      INR Target Range  Standard Dose                            2.0-3.0  High Dose                                2.5-3.5  Patients not receiving anticoagulant  Therapy Normal Range                     0.86-1.15    CBC Auto Differential [[id number]]  (Abnormal) Collected: 01/11/24 1442    Specimen: Blood from Arm, Left Updated: 01/11/24 1456     WBC 14.70 10*3/mm3      RBC 4.58 10*6/mm3      Hemoglobin 13.0 g/dL      Hematocrit 42.1 %      MCV 91.9 fL      MCH 28.4 pg      MCHC 30.9 g/dL      RDW 13.9 %      RDW-SD 46.7 fl      MPV 9.4 fL      Platelets 591 10*3/mm3      Neutrophil % 76.5 %      Lymphocyte % 14.3 %      Monocyte % 6.3 %      Eosinophil % 1.5 %      Basophil % 0.6 %      Immature Grans % 0.8 %      Neutrophils, Absolute 11.24 10*3/mm3      Lymphocytes, Absolute 2.10 10*3/mm3      Monocytes, Absolute 0.93 10*3/mm3      Eosinophils, Absolute 0.22 10*3/mm3      Basophils, Absolute 0.09 10*3/mm3      Immature Grans, Absolute 0.12 10*3/mm3      nRBC 0.0 /100 WBC     Urinalysis With Microscopic If Indicated (No Culture) - Urine, Clean Catch [[id number]]  (Abnormal) Collected: 01/11/24 1652    Specimen: Urine, Clean Catch Updated: 01/11/24 1704     Color, UA Yellow     Appearance, UA Clear     pH, UA 5.5     Specific Gravity, UA 1.017     Glucose, UA Negative     Ketones, UA Negative     Bilirubin, UA Negative     Blood, UA Negative     Protein, UA Trace     Leuk Esterase, UA Negative     Nitrite, UA Negative     Urobilinogen, UA 0.2 E.U./dL    Narrative:      Urine microscopic not indicated.    COVID-19, FLU A/B, RSV PCR 1 HR TAT - Swab, Nasopharynx [[id number]]  (Normal) Collected: 01/11/24 1724    Specimen: Swab from Nasopharynx Updated: 01/11/24 1815     COVID19 Not Detected     Influenza A PCR Not Detected     Influenza B PCR Not Detected     RSV, PCR Not Detected    Narrative:      Fact sheet for providers: https://www.fda.gov/media/117967/download    Fact sheet  for patients: https://www.fda.gov/media/449265/download    Test performed by PCR.    POC Glucose Once [[id number]]  (Abnormal) Collected: 01/11/24 1842    Specimen: Blood Updated: 01/11/24 1845     Glucose 50 mg/dL      Comment: Serial Number: [id number]:  [id number]       POC Glucose Once [[id number]]  (Abnormal) Collected: 01/11/24 1916    Specimen: Blood Updated: 01/11/24 1918     Glucose 52 mg/dL      Comment: Serial Number: [id number]:  [id number]       POC Glucose Once [[id number]]  (Normal) Collected: 01/11/24 1959    Specimen: Blood Updated: 01/11/24 2001     Glucose 77 mg/dL      Comment: Serial Number: [id number]:  [id number]                Imaging:    XR Shoulder 2+ View Right    Result Date: 1/11/2024  PROCEDURE: XR SHOULDER 2+ VW RIGHT  COMPARISON: None  INDICATIONS: RIGHT Shoulder pain after falling out of bed TODAY  FINDINGS:  There is no evidence for acute fracture or dislocation. The glenohumeral joint is intact. The acromioclavicular joint is intact.  Mild osteoarthritic degenerative changes of the glenohumeral joint are noted.  Moderate hypertrophic age related changes of the a.c. joint are seen. No focal osseous abnormalities are seen. The soft tissues appear unremarkable.        1. No evidence for displaced fracture or dislocation. 2. Mild osteoarthritic degenerative changes of the glenohumeral joint.  Moderate age-related changes of the a.c. joint are also noted.        MARY VILLEGAS MD       Electronically Signed and Approved By: MARY VILLEGAS MD on 1/11/2024 at 15:23             XR Spine Thoracic 3 View    Result Date: 1/11/2024  PROCEDURE: XR SPINE THORACIC 3 VW  COMPARISON: [facility], , T-SPINE-AP-LAT-SWIMMERS, 6/03/2014, 13:59.  INDICATIONS: upper/mid Back pain after falling out of bed today  FINDINGS:  There is a severe compression fracture deformity with anterior wedge deformity and sclerosis involving the T7 vertebral level.  There is no significant  retropulsion of fracture fragments.  There is no malalignment.  Postoperative changes are noted status post lower thoracic and lumbar fusion.  There are no signs of hardware failure or loosening.        1. Evidence for an acute to subacute severe compression fracture deformity with anterior wedge deformity at the T7 vertebral level.  There is no associated malalignment.     MARY VILLEGAS MD       Electronically Signed and Approved By: MARY VILLEGAS MD on 1/11/2024 at 15:22             XR Spine Lumbar 2 or 3 View    Result Date: 1/11/2024  PROCEDURE: XR SPINE LUMBAR 2 OR 3 VW  COMPARISON: [facility], CR, XR SPINE LUMBAR COMPLETE W FLEX EXT, 9/28/2022, 14:51.  INDICATIONS: low back Back pain after falling out of bed today  FINDINGS:  Extensive postoperative changes are noted status post thoracic, lumbar, and lumbosacral fusion.  There are no signs of hardware failure or loosening.  There is no acute displaced fracture or significant malalignment.  Vascular calcifications are noted within the soft tissues.        1. Extensive postoperative changes status post extensive fusion of the thoracolumbar spine and lumbosacral spine.  There are no signs of hardware failure or loosening.  No significant malalignment is identified.     MARY VILLEGAS MD       Electronically Signed and Approved By: MARY VILLEGAS MD on 1/11/2024 at 15:20             CT Head Without Contrast    Result Date: 1/11/2024  PROCEDURE: CT HEAD WO CONTRAST  COMPARISON:  None INDICATIONS: Fall, head injury, +LOC, posterior headache  PROTOCOL:   Standard imaging protocol performed    RADIATION:   DLP: 1313.7mGy*cm   MA and/or KV was adjusted to minimize radiation dose.     TECHNIQUE: After obtaining the patient's consent, CT images were obtained without non-ionic intravenous contrast material.  FINDINGS:  There is motion limitation.  Brain:  Motion limited imaging demonstrates no acute hemorrhage or mass effect.  The ventricles,  cisterns and sulci are grossly unremarkable in appearance.  Minimal white matter changes compatible small-vessel ischemic disease in this age group.  Orbits:  Orbits are grossly unremarkable and periorbital soft tissues appear grossly unremarkable.  Sinuses:  Paranasal sinuses are clear.  Mastoid air cells are clear.  Calvarium and soft tissues:  Bony calvarium is intact.  Soft tissues are grossly unremarkable in appearance.         1. No acute intracranial abnormality     FRANCIS HUFF MD       Electronically Signed and Approved By: FRANCIS HUFF MD on 1/11/2024 at 15:16             CT Cervical Spine Without Contrast    Result Date: 1/11/2024  PROCEDURE: CT CERVICAL SPINE WO CONTRAST  COMPARISON: None  INDICATIONS: Neck pain after fall out of bed  PROTOCOL:   Standard imaging protocol performed    RADIATION:   DLP: 1313.7mGy*cm   MA and/or KV was adjusted to minimize radiation dose.     TECHNIQUE: After obtaining the patient's consent, multi-planar CT images were created without contrast material.   FINDINGS:  There is no acute fracture or subluxation. The cervical vertebral body alignment is within normal limits. Multilevel cervical spondylosis is noted. Degenerative uncovertebral joint changes and posterior facet joint changes are also seen. These findings contribute to central canal narrowing and neural foraminal narrowing at multiple levels. Otherwise no significant focal osseous abnormalities are seen. There is no evidence for significant soft tissue hemorrhage or hematoma.  Carotid artery calcifications are noted.  No significant edema or abnormal fluid collection is seen.  There is evidence for multinodular goiter of the thyroid.  The mastoid air cells are clear. The lung apices are clear.      1. No evidence for acute fracture or subluxation. 2. No evidence for significant acute soft tissue abnormality. 3. Multilevel cervical spondylosis and degenerative uncovertebral joint/posterior facet joint  changes are noted.    MARY VILLEGAS MD       Electronically Signed and Approved By: MARY VILLEGAS MD on 1/11/2024 at 15:09                Differential Diagnosis and Discussion:      Back Pain: The patient presents with back pain. My differential diagnosis includes but is not limited to acute spinal epidural abscess, acute spinal epidural bleed, cauda equina syndrome, abdominal aortic aneurysm, aortic dissection, kidney stone, pyelonephritis, musculoskeletal back pain, spinal fracture, and osteoarthritis.   Headache: Differential diagnosis includes but is not limited to migraine, cluster headache, hypertension, tumor, subarachnoid bleeding, pseudotumor cerebri, temporal arteritis, infections, tension headache, and TMJ syndrome.    All labs were reviewed and interpreted by me.  All X-rays impressions were independently interpreted by me.  EKG was interpreted by me.  CT scan radiology impression was interpreted by me.    MDM     Amount and/or Complexity of Data Reviewed  Clinical lab tests: reviewed  Tests in the radiology section of CPT®: reviewed  Tests in the medicine section of CPT®: reviewed  Decide to obtain previous medical records or to obtain history from someone other than the patient: yes                 Patient Care Considerations:    MRI: I considered ordering an MRI however patient has no focal neurologic deficits.      Consultants/Shared Management Plan:        Social Determinants of Health:    Patient is independent, reliable, and has access to care.       Disposition and Care Coordination:    Discharged: I considered escalation of care by admitting this patient for observation, however the patient has improved and is suitable and  stable for discharge.    I have explained the patient´s condition, diagnoses and treatment plan based on the information available to me at this time. I have answered questions and addressed any concerns. The patient has a good  understanding of the patient´s diagnosis,  condition, and treatment plan as can be expected at this point. The vital signs have been stable. The patient´s condition is stable and appropriate for discharge from the emergency department.      The patient will pursue further outpatient evaluation with the primary care physician or other designated or consulting physician as outlined in the discharge instructions. They are agreeable to this plan of care and follow-up instructions have been explained in detail. The patient has received these instructions in written format and have expressed an understanding of the discharge instructions. The patient is aware that any significant change in condition or worsening of symptoms should prompt an immediate return to this or the closest emergency department or call to 911.    Final diagnoses:   Closed wedge compression fracture of T7 vertebra, initial encounter   Hypoglycemia        ED Disposition       ED Disposition   Discharge    Condition   Stable    Comment   --               This medical record created using voice recognition software.             Castro Lloyd MD  01/11/24 3724       Castro Lloyd MD  01/11/24 4642

## 2024-01-23 ENCOUNTER — OFFICE VISIT (OUTPATIENT)
Dept: FAMILY MEDICINE CLINIC | Facility: CLINIC | Age: 74
End: 2024-01-23
Payer: MEDICARE

## 2024-01-23 VITALS
HEIGHT: 64 IN | DIASTOLIC BLOOD PRESSURE: 68 MMHG | WEIGHT: 181 LBS | SYSTOLIC BLOOD PRESSURE: 99 MMHG | TEMPERATURE: 97.1 F | HEART RATE: 74 BPM | RESPIRATION RATE: 18 BRPM | BODY MASS INDEX: 30.9 KG/M2 | OXYGEN SATURATION: 97 %

## 2024-01-23 DIAGNOSIS — I10 ESSENTIAL HYPERTENSION: Primary | ICD-10-CM

## 2024-01-23 DIAGNOSIS — I50.22 CHRONIC HFREF (HEART FAILURE WITH REDUCED EJECTION FRACTION): ICD-10-CM

## 2024-01-23 RX ORDER — BENZONATATE 100 MG/1
100 CAPSULE ORAL 3 TIMES DAILY PRN
Qty: 30 CAPSULE | Refills: 0 | Status: SHIPPED | OUTPATIENT
Start: 2024-01-23

## 2024-01-23 NOTE — TELEPHONE ENCOUNTER
PT HAD LEFT VM CALLED HER BACK HER CONCERN WAS SHE WAS HAVING LARGE AMOUNT DRAINAGE SAMEERA AND I ADVISED FOR HER TO CALL HER HOME HEALTH DUE TO HOLIDAY WEEKEND IF HER SYMPTOMS GETS WORSE SHE NEEDS TO GO TO ER

## 2024-01-24 ENCOUNTER — PATIENT ROUNDING (BHMG ONLY) (OUTPATIENT)
Dept: FAMILY MEDICINE CLINIC | Facility: CLINIC | Age: 74
End: 2024-01-24
Payer: MEDICARE

## 2024-01-24 NOTE — PROGRESS NOTES
A My-Chart message has been sent to the patient for PATIENT ROUNDING with [facility] [place].

## 2024-01-25 NOTE — PROGRESS NOTES
"Chief Complaint  Follow-up (Syncope ) and Fall (Fell at home, pain in right shoulder and tailbone. )    Subjective          Deisy Lama presents to [facility]  Fall        Patient having issues with her blood pressure low normal had a fall syncopal type episode fell on shoulder tailbone having pain recommended reviewing blood pressure medicines and other medicine she is taking to see if contributing to no head trauma headache palpitations    Objective   Vital Signs:   BP 99/68 (BP Location: Left arm, Patient Position: Sitting, Cuff Size: Adult)   Pulse 74   Temp 97.1 °F (36.2 °C) (Temporal)   Resp 18   Ht 162.6 cm (64\")   Wt 82.1 kg (181 lb)   SpO2 97%   BMI 31.07 kg/m²            Physical Exam  Vitals reviewed.   Constitutional:       Appearance: Normal appearance. She is well-developed.   HENT:      Head: Normocephalic and atraumatic.      Right Ear: External ear normal.      Left Ear: External ear normal.      Nose: Nose normal.   Eyes:      Conjunctiva/sclera: Conjunctivae normal.      Pupils: Pupils are equal, round, and reactive to light.   Cardiovascular:      Rate and Rhythm: Normal rate.   Pulmonary:      Effort: Pulmonary effort is normal.      Breath sounds: Normal breath sounds.   Abdominal:      General: There is no distension.   Skin:     General: Skin is warm and dry.   Neurological:      Mental Status: She is alert and oriented to person, place, and time. Mental status is at baseline.   Psychiatric:         Mood and Affect: Mood and affect normal.         Behavior: Behavior normal.         Thought Content: Thought content normal.         Judgment: Judgment normal.          Result Review :   The following data was reviewed by: Sanchez Valenzuela DO on 01/23/2024:  Common labs          11/27/2023    13:40 12/13/2023    11:50 1/11/2024    14:42   Common Labs   Glucose 57  129  133    BUN 30  48  49    Creatinine 1.39  1.95  1.80    Sodium 140  142  144  "   Potassium 4.2  4.3  5.4    Chloride 107  109  111    Calcium 9.6  9.3  9.3    Albumin  3.4  3.6    Total Bilirubin  0.2  0.3    Alkaline Phosphatase  120  106    AST (SGOT)  22  25    ALT (SGPT)  25  32    WBC 11.57  10.25  14.70    Hemoglobin 12.1  11.9  13.0    Hematocrit 36.6  39.8  42.1    Platelets 521  491  591    Total Cholesterol  102     Triglycerides  111     HDL Cholesterol  33     LDL Cholesterol   48     Hemoglobin A1C  6.30     Microalbumin, Urine  <1.2                     Assessment and Plan    Diagnoses and all orders for this visit:    1. Essential hypertension (Primary)    2. Chronic HFrEF nonischemic    Other orders  -     benzonatate (Tessalon Perles) 100 MG capsule; Take 1 capsule by mouth 3 (Three) Times a Day As Needed for Cough.  Dispense: 30 capsule; Refill: 0      Recommended taking half of her blood pressure medicine monitoring at home or even holding goal to have systolic blood pressures remain above 100 with no symptoms at home    Refilled her started cough medicine additionally could be related to chronic heart failure      Follow Up   Return in about 1 week (around 1/30/2024), or if symptoms worsen or fail to improve, for Next scheduled follow up, Recheck.  Patient was given instructions and counseling regarding her condition or for health maintenance advice. Please see specific information pulled into the AVS if appropriate.     Transcribed from ambient dictation for Sanchez Valenzuela DO by Sanchez Valenzuela DO.  01/25/24   07:44 EST

## 2024-01-26 ENCOUNTER — OFFICE VISIT (OUTPATIENT)
Dept: WOUND CARE | Facility: HOSPITAL | Age: 74
End: 2024-01-26
Payer: MEDICARE

## 2024-01-26 VITALS
TEMPERATURE: 97.1 F | HEART RATE: 53 BPM | SYSTOLIC BLOOD PRESSURE: 140 MMHG | DIASTOLIC BLOOD PRESSURE: 82 MMHG | RESPIRATION RATE: 18 BRPM

## 2024-01-26 DIAGNOSIS — Z98.1 HISTORY OF SPINAL FUSION: Primary | ICD-10-CM

## 2024-01-26 DIAGNOSIS — T81.31XD DEHISCENCE OF OPERATIVE WOUND, SUBSEQUENT ENCOUNTER: ICD-10-CM

## 2024-01-26 DIAGNOSIS — E11.628 CONTROLLED TYPE 2 DIABETES MELLITUS WITH OTHER SKIN COMPLICATION, WITHOUT LONG-TERM CURRENT USE OF INSULIN: ICD-10-CM

## 2024-01-26 PROCEDURE — G0463 HOSPITAL OUTPT CLINIC VISIT: HCPCS | Performed by: PHYSICIAN ASSISTANT

## 2024-01-26 NOTE — PROGRESS NOTES
Chief Complaint  Wound Check (Follow up visit for wound dehiscence.  Patient's BS 99.  Patient had fall out of bed causing issues with T-7 and shoulder issues recently.)    Subjective      History of Present Illness    Deisy Lama  is a 73 y.o. female here for lower back wound. She underwent extensive spinal fusion surgery in early September 2023.  She has posterior rods with bilateral pedicle screws extending from T9-S1 as well as bilateral iliac screws.  Midline spinal wound had sutures and glue and majority of the wound healed well but there is an area of wound dehiscence. The patient's surgeon referred her here for evaluation.  She says her back pain is improved since fusion.     Patient is a well-controlled diabetic. She is not a smoker.    Homehealth has been assisting with dressing changes. They have been packing the wound with iodoform gauze packing strips and changing it twice daily. She denies any major changes in wound since last appointment but notes that she fractured her T7 vertebra and follows up with her surgeon in 4 days.     Allergies:  Morphine, Dilaudid [hydromorphone], Latex, and Lisinopril      Current Outpatient Medications:     atorvastatin (LIPITOR) 10 MG tablet, Take 1 tablet by mouth Every Night., Disp: , Rfl:     benzonatate (Tessalon Perles) 100 MG capsule, Take 1 capsule by mouth 3 (Three) Times a Day As Needed for Cough., Disp: 30 capsule, Rfl: 0    brimonidine (ALPHAGAN) 0.2 % ophthalmic solution, Administer 1 drop to both eyes 2 (Two) Times a Day., Disp: , Rfl:     diclofenac (VOLTAREN) 75 MG EC tablet, Take 1 tablet by mouth 2 (Two) Times a Day., Disp: , Rfl:     dorzolamide (TRUSOPT) 2 % ophthalmic solution, Administer 1 drop to both eyes 2 (Two) Times a Day., Disp: , Rfl:     escitalopram (LEXAPRO) 10 MG tablet, Take 1 tablet by mouth Daily., Disp: , Rfl:     furosemide (LASIX) 40 MG tablet, Take 1 tablet by mouth Daily., Disp: 90 tablet, Rfl: 3    hydroCHLOROthiazide  (HYDRODIURIL) 25 MG tablet, Take 1 tablet by mouth Daily., Disp: , Rfl:     latanoprost (XALATAN) 0.005 % ophthalmic solution, Administer 1 drop to both eyes Every Night., Disp: , Rfl:     levocetirizine (XYZAL) 5 MG tablet, Take 1 tablet by mouth Every Evening., Disp: 30 tablet, Rfl: 2    levothyroxine (SYNTHROID, LEVOTHROID) 125 MCG tablet, Take 1 tablet by mouth Daily., Disp: 90 tablet, Rfl: 3    losartan (COZAAR) 100 MG tablet, Take 1 tablet by mouth Daily., Disp: , Rfl:     metoprolol succinate XL (TOPROL-XL) 50 MG 24 hr tablet, Take 1 tablet by mouth 2 (Two) Times a Day With Meals., Disp: 180 tablet, Rfl: 3    omeprazole (priLOSEC) 40 MG capsule, Take 1 capsule by mouth Daily., Disp: 90 capsule, Rfl: 3    ondansetron ODT (ZOFRAN-ODT) 4 MG disintegrating tablet, Place 1 tablet on the tongue Every 6 (Six) Hours As Needed for Vomiting., Disp: 15 tablet, Rfl: 0    oxyCODONE-acetaminophen (PERCOCET) 5-325 MG per tablet, Take 1 tablet by mouth Every 6 (Six) Hours As Needed for Severe Pain., Disp: 12 tablet, Rfl: 0    Restasis 0.05 % ophthalmic emulsion, Administer 1 drop to both eyes 2 (Two) Times a Day., Disp: , Rfl:     rOPINIRole (REQUIP) 4 MG tablet, Take 1 tablet by mouth Daily With Breakfast & Lunch., Disp: , Rfl:     spironolactone (ALDACTONE) 50 MG tablet, Take 1 tablet by mouth Daily., Disp: 90 tablet, Rfl: 3    triamcinolone (KENALOG) 0.1 % ointment, Apply 1 application  topically to the appropriate area as directed 2 (Two) Times a Day., Disp: 30 g, Rfl: 0    vitamin D (ERGOCALCIFEROL) 1.25 MG (83521 UT) capsule capsule, Take 1 capsule by mouth 1 (One) Time Per Week., Disp: , Rfl:     Past Medical History:   Diagnosis Date    AAA (abdominal aortic aneurysm)     Pt reported/BEING MONITORED    Acid reflux     Arthritis     Blood disease     ABNORMAL PLATELET COUNT/CHRONIC ELEVATED WBC,NO LEUKEMIA    CHB (complete heart block)     PACEMAKER    Congestive heart failure (CHF)     MANAGED W/DIURETIC, NO RECENT  PROBLEMS    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    Diabetes mellitus     AVERAGE AM BS 'S    Glaucoma     Hiatal hernia     History of transfusion     LAST 2023 REPORTS NO TRANSFUSION REACTION POST SPINAL FUSION    Hyperlipidemia LDL goal <100 08/23/2021    Hypertension     Kidney stone     NO CURRENT ISSUES    Presence of biventricular implantable cardioverter-defibrillator (ICD) 08/23/2021    Sciatica     LEFT    Sleep apnea     Thyroid disorder     hypo    Wound of back     REPORTS HAS NOT HEALED SINCE BACK SURGERY AND IS FOLLOWED BY WOUND CARE     Past Surgical History:   Procedure Laterality Date    APPENDECTOMY      BLADDER SURGERY      REPAIR    CARDIAC DEFIBRILLATOR PLACEMENT      CATARACT EXTRACTION Bilateral     WITH LENS IMPLANT    CHOLECYSTECTOMY      COLONOSCOPY      GASTROPLASTY      FOR WEIGHT LOSS    HEEL SPUR SURGERY      HERNIA REPAIR      ABDOMINAL    HYSTERECTOMY      INGUINAL HERNIA REPAIR Bilateral 8/31/2022    Procedure: INGUINAL HERNIA REPAIR LAPAROSCOPIC WITH AltobridgeI ROBOT, lysis of adhesions;  Surgeon: Lauri Celis MD;  Location: [facility] OR [facility] [place];  Service: Robotics - DaVinci;  Laterality: Bilateral;    KIDNEY STONE SURGERY      LUMBAR DISCECTOMY Left 1/27/2023    Procedure: MINIMALLY INVASIVE LUMBAR LATERAL RECESS DECOMPRESSION AND FORAMINOTOMY, LEFT APPROACH, LUMBAR 2-LUMBAR 3;  Surgeon: Ralf Rodriguez MD;  Location: [facility] MAIN OR;  Service: Neurosurgery;  Laterality: Left;    PACEMAKER IMPLANTATION      PACEMAKER REPLACEMENT N/A 11/28/2023    Procedure: PPM generator change - dual;  Surgeon: Michael Bernal MD;  Location: [facility] CATH INVASIVE LOCATION;  Service: Cardiovascular;  Laterality: N/A;    SPLENECTOMY      TRAUMATIC INJURY R/T GASTROPLASTY SURGERY    TONSILLECTOMY       Social History     Socioeconomic History    Marital status:    Tobacco Use    Smoking status: Never     Passive exposure: Never    Smokeless tobacco: Never   Vaping Use    Vaping Use: Never used    Substance and Sexual Activity    Alcohol use: Never    Drug use: Never    Sexual activity: Defer     Objective     Vitals:    01/26/24 1058   BP: 140/82   BP Location: Left arm   Patient Position: Sitting   Cuff Size: Adult   Pulse: 53   Resp: 18  Comment: 93% room air   Temp: 97.1 °F (36.2 °C)   TempSrc: Temporal     Review of Systems     ROS:  Per HPI.      I have reviewed the HPI and ROS as documented by MA/RN. Salma Spears PA-C    Physical Exam     NAD  AAOx3, pleasant, cooperative  Small open wound dehiscence mid lower lumbar region.  There is still extensive tunneling superiorly but the area is narrower.  The tunneling inferiorly and to the left of midline continues to improve and fill in. No erythema, heat, purulence, induration, or tenderness present. There is clear drainage from wound today. No odor to drainage today.       Low back:  See photos for details.      Result Review :  The following data was reviewed by: Salma Spears PA-C on 01/26/2024:    Prior notes and images.         Assessment and Plan   Diagnoses and all orders for this visit:    1. History of spinal fusion (Primary)    2. 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 Dehiscence of operative wound, subsequent encounter      Patient's wound is improving but there is still significant tunneling. No odor from wound today or signs of infection. Homehealth has been assisting with wound care and should continue this care plan. We will have them pack the wound area  with iodoform gauze packing strips and changing it twice daily. This should then be covered with a silicone bordered gauze dressing such as Mepilex or sterile dry gauze. She denies any major changes in wound since last appointment but notes that she fractured her T7 vertebra and follows up with her surgeon in 4 days. She should discuss her slow wound healing with her surgeon.       Patient should  avoid lying on the wound or having any pressure on this area at all times to prevent excessive pressure or shear stress on the tissues.    Continue glucose control and protein intake to aid healing.    Recheck 5 weeks.    Patient was given instructions and counseling regarding their condition or for health maintenance advice, as well as the wound care plan and recommendations. They understand and agree with the plan.  They will follow back up here in the clinic but are instructed to contact us in the interim should they have any new, returning, or worsening symptoms or concerns. Please see specific information pulled into the AVS if appropriate.     Dragon Dictation utilized for chart completion.    Follow Up   Return in about 5 weeks (around 3/1/2024) for Recheck.      Salma Spears PA-C

## 2024-01-30 ENCOUNTER — TRANSCRIBE ORDERS (OUTPATIENT)
Dept: ADMINISTRATIVE | Facility: HOSPITAL | Age: 74
End: 2024-01-30
Payer: MEDICARE

## 2024-01-30 ENCOUNTER — OFFICE VISIT (OUTPATIENT)
Dept: FAMILY MEDICINE CLINIC | Facility: CLINIC | Age: 74
End: 2024-01-30
Payer: MEDICARE

## 2024-01-30 ENCOUNTER — LAB (OUTPATIENT)
Dept: LAB | Facility: HOSPITAL | Age: 74
End: 2024-01-30
Payer: MEDICARE

## 2024-01-30 VITALS
HEART RATE: 52 BPM | TEMPERATURE: 97.8 F | HEIGHT: 65 IN | BODY MASS INDEX: 31.02 KG/M2 | WEIGHT: 186.2 LBS | DIASTOLIC BLOOD PRESSURE: 56 MMHG | SYSTOLIC BLOOD PRESSURE: 130 MMHG | OXYGEN SATURATION: 94 % | RESPIRATION RATE: 16 BRPM

## 2024-01-30 DIAGNOSIS — I10 ESSENTIAL HYPERTENSION: ICD-10-CM

## 2024-01-30 DIAGNOSIS — I10 ESSENTIAL HYPERTENSION: Primary | Chronic | ICD-10-CM

## 2024-01-30 DIAGNOSIS — E78.5 HYPERLIPIDEMIA LDL GOAL <100: ICD-10-CM

## 2024-01-30 DIAGNOSIS — T81.49XA POSTOPERATIVE WOUND INFECTION: ICD-10-CM

## 2024-01-30 DIAGNOSIS — E11.69 DM TYPE 2 WITH DIABETIC DYSLIPIDEMIA: Chronic | ICD-10-CM

## 2024-01-30 DIAGNOSIS — Z01.818 PRE-OP TESTING: ICD-10-CM

## 2024-01-30 DIAGNOSIS — E78.5 DM TYPE 2 WITH DIABETIC DYSLIPIDEMIA: Chronic | ICD-10-CM

## 2024-01-30 DIAGNOSIS — I50.22 CHRONIC HFREF (HEART FAILURE WITH REDUCED EJECTION FRACTION): Chronic | ICD-10-CM

## 2024-01-30 DIAGNOSIS — Z01.818 PRE-OP TESTING: Primary | ICD-10-CM

## 2024-01-30 LAB
25(OH)D3 SERPL-MCNC: 62.6 NG/ML (ref 30–100)
ABO GROUP BLD: NORMAL
ALBUMIN SERPL-MCNC: 3.6 G/DL (ref 3.5–5.2)
ALBUMIN/GLOB SERPL: 1.1 G/DL
ALP SERPL-CCNC: 135 U/L (ref 39–117)
ALT SERPL W P-5'-P-CCNC: 16 U/L (ref 1–33)
ANION GAP SERPL CALCULATED.3IONS-SCNC: 11.7 MMOL/L (ref 5–15)
APTT PPP: 26.7 SECONDS (ref 24.2–34.2)
AST SERPL-CCNC: 14 U/L (ref 1–32)
BASOPHILS # BLD AUTO: 0.13 10*3/MM3 (ref 0–0.2)
BASOPHILS NFR BLD AUTO: 1 % (ref 0–1.5)
BILIRUB SERPL-MCNC: 0.3 MG/DL (ref 0–1.2)
BUN SERPL-MCNC: 43 MG/DL (ref 8–23)
BUN/CREAT SERPL: 26.5 (ref 7–25)
CALCIUM SPEC-SCNC: 9.5 MG/DL (ref 8.6–10.5)
CHLORIDE SERPL-SCNC: 110 MMOL/L (ref 98–107)
CO2 SERPL-SCNC: 24.3 MMOL/L (ref 22–29)
CREAT SERPL-MCNC: 1.62 MG/DL (ref 0.57–1)
CRP SERPL-MCNC: <0.3 MG/DL (ref 0–0.5)
DEPRECATED RDW RBC AUTO: 40.1 FL (ref 37–54)
EGFRCR SERPLBLD CKD-EPI 2021: 33 ML/MIN/1.73
EOSINOPHIL # BLD AUTO: 0.9 10*3/MM3 (ref 0–0.4)
EOSINOPHIL NFR BLD AUTO: 7.2 % (ref 0.3–6.2)
ERYTHROCYTE [DISTWIDTH] IN BLOOD BY AUTOMATED COUNT: 12.9 % (ref 12.3–15.4)
ERYTHROCYTE [SEDIMENTATION RATE] IN BLOOD: 11 MM/HR (ref 0–30)
FERRITIN SERPL-MCNC: 117 NG/ML (ref 13–150)
GLOBULIN UR ELPH-MCNC: 3.2 GM/DL
GLUCOSE SERPL-MCNC: 70 MG/DL (ref 65–99)
HBA1C MFR BLD: 6.9 % (ref 4.8–5.6)
HCT VFR BLD AUTO: 36.9 % (ref 34–46.6)
HGB BLD-MCNC: 11.9 G/DL (ref 12–15.9)
IMM GRANULOCYTES # BLD AUTO: 0.04 10*3/MM3 (ref 0–0.05)
IMM GRANULOCYTES NFR BLD AUTO: 0.3 % (ref 0–0.5)
INR PPP: 0.99 (ref 0.86–1.15)
IRON 24H UR-MRATE: 36 MCG/DL (ref 37–145)
IRON SATN MFR SERPL: 10 % (ref 20–50)
LYMPHOCYTES # BLD AUTO: 2.39 10*3/MM3 (ref 0.7–3.1)
LYMPHOCYTES NFR BLD AUTO: 19.1 % (ref 19.6–45.3)
MCH RBC QN AUTO: 27.9 PG (ref 26.6–33)
MCHC RBC AUTO-ENTMCNC: 32.2 G/DL (ref 31.5–35.7)
MCV RBC AUTO: 86.4 FL (ref 79–97)
MONOCYTES # BLD AUTO: 1.23 10*3/MM3 (ref 0.1–0.9)
MONOCYTES NFR BLD AUTO: 9.8 % (ref 5–12)
NEUTROPHILS NFR BLD AUTO: 62.6 % (ref 42.7–76)
NEUTROPHILS NFR BLD AUTO: 7.8 10*3/MM3 (ref 1.7–7)
NRBC BLD AUTO-RTO: 0 /100 WBC (ref 0–0.2)
NT-PROBNP SERPL-MCNC: 998 PG/ML (ref 0–1800)
PLATELET # BLD AUTO: 529 10*3/MM3 (ref 140–450)
PMV BLD AUTO: 9.9 FL (ref 6–12)
POTASSIUM SERPL-SCNC: 4.3 MMOL/L (ref 3.5–5.2)
PREALB SERPL-MCNC: 20.8 MG/DL (ref 20–40)
PROT SERPL-MCNC: 6.8 G/DL (ref 6–8.5)
PROTHROMBIN TIME: 13.3 SECONDS (ref 11.8–14.9)
RBC # BLD AUTO: 4.27 10*6/MM3 (ref 3.77–5.28)
RH BLD: POSITIVE
SODIUM SERPL-SCNC: 146 MMOL/L (ref 136–145)
T4 FREE SERPL-MCNC: 2.67 NG/DL (ref 0.93–1.7)
TIBC SERPL-MCNC: 350 MCG/DL (ref 298–536)
TRANSFERRIN SERPL-MCNC: 235 MG/DL (ref 200–360)
TSH SERPL DL<=0.05 MIU/L-ACNC: 0.68 UIU/ML (ref 0.27–4.2)
WBC NRBC COR # BLD AUTO: 12.49 10*3/MM3 (ref 3.4–10.8)

## 2024-01-30 PROCEDURE — 82728 ASSAY OF FERRITIN: CPT

## 2024-01-30 PROCEDURE — 84443 ASSAY THYROID STIM HORMONE: CPT

## 2024-01-30 PROCEDURE — 3044F HG A1C LEVEL LT 7.0%: CPT | Performed by: FAMILY MEDICINE

## 2024-01-30 PROCEDURE — 82306 VITAMIN D 25 HYDROXY: CPT

## 2024-01-30 PROCEDURE — 84466 ASSAY OF TRANSFERRIN: CPT

## 2024-01-30 PROCEDURE — 85025 COMPLETE CBC W/AUTO DIFF WBC: CPT

## 2024-01-30 PROCEDURE — 1159F MED LIST DOCD IN RCRD: CPT | Performed by: FAMILY MEDICINE

## 2024-01-30 PROCEDURE — 84134 ASSAY OF PREALBUMIN: CPT

## 2024-01-30 PROCEDURE — 83880 ASSAY OF NATRIURETIC PEPTIDE: CPT | Performed by: FAMILY MEDICINE

## 2024-01-30 PROCEDURE — 83036 HEMOGLOBIN GLYCOSYLATED A1C: CPT

## 2024-01-30 PROCEDURE — 82985 ASSAY OF GLYCATED PROTEIN: CPT

## 2024-01-30 PROCEDURE — 84439 ASSAY OF FREE THYROXINE: CPT

## 2024-01-30 PROCEDURE — 85610 PROTHROMBIN TIME: CPT

## 2024-01-30 PROCEDURE — 86900 BLOOD TYPING SEROLOGIC ABO: CPT

## 2024-01-30 PROCEDURE — 83540 ASSAY OF IRON: CPT

## 2024-01-30 PROCEDURE — 80053 COMPREHEN METABOLIC PANEL: CPT

## 2024-01-30 PROCEDURE — 86140 C-REACTIVE PROTEIN: CPT

## 2024-01-30 PROCEDURE — 99213 OFFICE O/P EST LOW 20 MIN: CPT | Performed by: FAMILY MEDICINE

## 2024-01-30 PROCEDURE — 85652 RBC SED RATE AUTOMATED: CPT

## 2024-01-30 PROCEDURE — 1160F RVW MEDS BY RX/DR IN RCRD: CPT | Performed by: FAMILY MEDICINE

## 2024-01-30 PROCEDURE — 86901 BLOOD TYPING SEROLOGIC RH(D): CPT

## 2024-01-30 PROCEDURE — 3075F SYST BP GE 130 - 139MM HG: CPT | Performed by: FAMILY MEDICINE

## 2024-01-30 PROCEDURE — 36415 COLL VENOUS BLD VENIPUNCTURE: CPT

## 2024-01-30 PROCEDURE — 85730 THROMBOPLASTIN TIME PARTIAL: CPT

## 2024-01-30 PROCEDURE — 3078F DIAST BP <80 MM HG: CPT | Performed by: FAMILY MEDICINE

## 2024-01-30 RX ORDER — GLIMEPIRIDE 2 MG/1
2 TABLET ORAL
COMMUNITY
Start: 2024-01-26

## 2024-01-31 ENCOUNTER — TELEPHONE (OUTPATIENT)
Dept: FAMILY MEDICINE CLINIC | Facility: CLINIC | Age: 74
End: 2024-01-31
Payer: MEDICARE

## 2024-01-31 DIAGNOSIS — R73.09 ELEVATED HEMOGLOBIN A1C: Primary | ICD-10-CM

## 2024-01-31 LAB — FRUCTOSAMINE SERPL-SCNC: 219 UMOL/L (ref 0–285)

## 2024-01-31 RX ORDER — LEVOTHYROXINE SODIUM 0.1 MG/1
100 TABLET ORAL DAILY
Qty: 90 TABLET | Refills: 1 | Status: SHIPPED | OUTPATIENT
Start: 2024-01-31

## 2024-01-31 NOTE — TELEPHONE ENCOUNTER
Caller: Deisy Lama    Relationship: Self    Best call back number: [phone]    What is the best time to reach you: ANYTIME    Who are you requesting to speak with (clinical staff, provider,  specific staff member): NURSE     What was the call regarding: PATIENT IS NEEDING A PRIOR AUTHORIZATION ON THE TEST STRIPS S SOON AS POSSIBLE.

## 2024-01-31 NOTE — PROGRESS NOTES
Kidney function did improve slightly  Thyroid levels are little too high we need to reduce your medicine to help at least 100 mcg

## 2024-02-01 RX ORDER — BLOOD-GLUCOSE METER
1 KIT MISCELLANEOUS AS NEEDED
Qty: 1 EACH | Refills: 0 | Status: SHIPPED | OUTPATIENT
Start: 2024-02-01

## 2024-02-01 NOTE — TELEPHONE ENCOUNTER
Per pharmacy, all they need is a prescription for meter to show why patient needs test strips    Sending to pharmacy

## 2024-02-09 ENCOUNTER — TELEPHONE (OUTPATIENT)
Dept: CARDIOLOGY | Facility: CLINIC | Age: 74
End: 2024-02-09
Payer: MEDICARE

## 2024-02-09 NOTE — TELEPHONE ENCOUNTER
Patient fell due to low blood sugar, she lady to the emergency room and was evaluated.    Ms. Lama was concerned about her device and leads.  Her leads are chronic and she is on home remote monitoring.  She is also pacemaker dependent, I explained how leads have scar tissue surrounding the lead and it is very difficult for them to just come out.

## 2024-02-13 PROBLEM — E11.69 DM TYPE 2 WITH DIABETIC DYSLIPIDEMIA: Status: ACTIVE | Noted: 2024-02-13

## 2024-02-13 PROBLEM — E78.5 DM TYPE 2 WITH DIABETIC DYSLIPIDEMIA: Status: ACTIVE | Noted: 2024-02-13

## 2024-02-15 ENCOUNTER — OFFICE VISIT (OUTPATIENT)
Dept: WOUND CARE | Facility: HOSPITAL | Age: 74
End: 2024-02-15
Payer: MEDICARE

## 2024-02-15 VITALS
SYSTOLIC BLOOD PRESSURE: 122 MMHG | DIASTOLIC BLOOD PRESSURE: 82 MMHG | RESPIRATION RATE: 18 BRPM | TEMPERATURE: 98.1 F | HEART RATE: 68 BPM

## 2024-02-15 DIAGNOSIS — Z98.1 HISTORY OF SPINAL FUSION: Primary | ICD-10-CM

## 2024-02-15 DIAGNOSIS — T81.31XD DEHISCENCE OF OPERATIVE WOUND, SUBSEQUENT ENCOUNTER: ICD-10-CM

## 2024-02-15 DIAGNOSIS — E11.628 CONTROLLED TYPE 2 DIABETES MELLITUS WITH OTHER SKIN COMPLICATION, WITHOUT LONG-TERM CURRENT USE OF INSULIN: ICD-10-CM

## 2024-02-15 PROCEDURE — G0463 HOSPITAL OUTPT CLINIC VISIT: HCPCS | Performed by: PHYSICIAN ASSISTANT

## 2024-02-15 RX ORDER — CEPHALEXIN 500 MG/1
1000 TABLET ORAL 3 TIMES DAILY
COMMUNITY
Start: 2024-02-06 | End: 2024-02-16

## 2024-03-05 ENCOUNTER — OFFICE VISIT (OUTPATIENT)
Dept: FAMILY MEDICINE CLINIC | Facility: CLINIC | Age: 74
End: 2024-03-05
Payer: MEDICARE

## 2024-03-05 VITALS
OXYGEN SATURATION: 98 % | SYSTOLIC BLOOD PRESSURE: 138 MMHG | RESPIRATION RATE: 18 BRPM | HEIGHT: 65 IN | TEMPERATURE: 98 F | HEART RATE: 76 BPM | DIASTOLIC BLOOD PRESSURE: 64 MMHG | WEIGHT: 181 LBS | BODY MASS INDEX: 30.16 KG/M2

## 2024-03-05 DIAGNOSIS — E11.69 DM TYPE 2 WITH DIABETIC DYSLIPIDEMIA: Chronic | ICD-10-CM

## 2024-03-05 DIAGNOSIS — I50.22 CHRONIC HFREF (HEART FAILURE WITH REDUCED EJECTION FRACTION): Chronic | ICD-10-CM

## 2024-03-05 DIAGNOSIS — E78.5 HYPERLIPIDEMIA LDL GOAL <100: ICD-10-CM

## 2024-03-05 DIAGNOSIS — R60.0 BILATERAL LEG EDEMA: ICD-10-CM

## 2024-03-05 DIAGNOSIS — E78.5 DM TYPE 2 WITH DIABETIC DYSLIPIDEMIA: Chronic | ICD-10-CM

## 2024-03-05 DIAGNOSIS — R53.1 WEAKNESS: Primary | ICD-10-CM

## 2024-03-05 DIAGNOSIS — E66.09 CLASS 1 OBESITY DUE TO EXCESS CALORIES WITH SERIOUS COMORBIDITY AND BODY MASS INDEX (BMI) OF 30.0 TO 30.9 IN ADULT: Chronic | ICD-10-CM

## 2024-03-05 DIAGNOSIS — M43.6 TORTICOLLIS: ICD-10-CM

## 2024-03-05 PROBLEM — E66.811 CLASS 1 OBESITY DUE TO EXCESS CALORIES WITH SERIOUS COMORBIDITY AND BODY MASS INDEX (BMI) OF 30.0 TO 30.9 IN ADULT: Status: ACTIVE | Noted: 2024-03-05

## 2024-03-05 PROCEDURE — 3078F DIAST BP <80 MM HG: CPT | Performed by: FAMILY MEDICINE

## 2024-03-05 PROCEDURE — 1159F MED LIST DOCD IN RCRD: CPT | Performed by: FAMILY MEDICINE

## 2024-03-05 PROCEDURE — 3075F SYST BP GE 130 - 139MM HG: CPT | Performed by: FAMILY MEDICINE

## 2024-03-05 PROCEDURE — 3044F HG A1C LEVEL LT 7.0%: CPT | Performed by: FAMILY MEDICINE

## 2024-03-05 PROCEDURE — 1160F RVW MEDS BY RX/DR IN RCRD: CPT | Performed by: FAMILY MEDICINE

## 2024-03-05 PROCEDURE — 99214 OFFICE O/P EST MOD 30 MIN: CPT | Performed by: FAMILY MEDICINE

## 2024-03-05 RX ORDER — METOLAZONE 5 MG/1
5 TABLET ORAL 2 TIMES DAILY
Qty: 60 TABLET | Refills: 2 | Status: SHIPPED | OUTPATIENT
Start: 2024-03-05 | End: 2024-03-13

## 2024-03-05 RX ORDER — GLIMEPIRIDE 2 MG/1
2 TABLET ORAL
Qty: 90 TABLET | Refills: 3 | Status: SHIPPED | OUTPATIENT
Start: 2024-03-05

## 2024-03-13 RX ORDER — METOLAZONE 5 MG/1
5 TABLET ORAL 2 TIMES DAILY
Qty: 60 TABLET | Refills: 2 | OUTPATIENT
Start: 2024-03-13

## 2024-03-13 RX ORDER — METOLAZONE 5 MG/1
5 TABLET ORAL 2 TIMES DAILY PRN
Qty: 60 TABLET | Refills: 5 | Status: SHIPPED | OUTPATIENT
Start: 2024-03-13

## 2024-03-13 NOTE — PROGRESS NOTES
"Chief Complaint  Edema (Bilateral lower leg x1 month.), Neck Pain (X2 weeks), Headache (Started 1 month ago.), and Obesity (Wants to discuss weight loss options.)    Subjective          Deisy Lama presents to [facility]  Neck Pain   Associated symptoms include headaches.   Headache  Obesity  Associated symptoms include headaches and neck pain.       Patient presents to follow-up on bilateral leg edema over a month of persistent swelling no improvement with measures at home such as leg elevation or other.  With the swelling she is having complaints of weight gain and wants to work on obesity and weight loss moving forward.    Additionally she is having neck pain on the left side around the SCM and associated with headaches for about a month as well.  Denies history of chronic migraines or aura could be related to increased tension and stress is located more posterior    Area on her back that she had delayed healing from surgery is improving now after specialist back to work on it       Objective   Vital Signs:   /64 (BP Location: Left arm, Patient Position: Sitting, Cuff Size: Adult)   Pulse 76   Temp 98 °F (36.7 °C) (Temporal)   Resp 18   Ht 165.1 cm (65\")   Wt 82.1 kg (181 lb)   SpO2 98%   BMI 30.12 kg/m²            Physical Exam  Vitals reviewed.   Constitutional:       Appearance: Normal appearance. She is well-developed.   HENT:      Head: Normocephalic and atraumatic.      Right Ear: External ear normal.      Left Ear: External ear normal.      Nose: Nose normal.   Eyes:      Conjunctiva/sclera: Conjunctivae normal.      Pupils: Pupils are equal, round, and reactive to light.   Cardiovascular:      Rate and Rhythm: Normal rate.   Pulmonary:      Effort: Pulmonary effort is normal.      Breath sounds: Normal breath sounds.   Abdominal:      General: There is no distension.   Musculoskeletal:      Right lower leg: Edema present.      Left lower leg: Edema " present.   Skin:     General: Skin is warm and dry.   Neurological:      Mental Status: She is alert and oriented to person, place, and time. Mental status is at baseline.      Motor: Weakness present.      Gait: Gait abnormal.   Psychiatric:         Mood and Affect: Mood and affect normal.         Behavior: Behavior normal.         Thought Content: Thought content normal.         Judgment: Judgment normal.          Result Review :   The following data was reviewed by: Sanchez Valenzuela DO on 03/05/2024:  Common labs          1/30/2024    12:37 2/3/2024    05:02 2/5/2024    05:50   Common Labs   Glucose 70      BUN 43      Creatinine 1.62      Sodium 146      Potassium 4.3      Chloride 110      Calcium 9.5      Albumin 3.6   3.2       Total Bilirubin 0.3   0.4       Alkaline Phosphatase 135   104       AST (SGOT) 14   16       ALT (SGPT) 16   <5       WBC 12.49  12.17     11.58       Hemoglobin 11.9  10.5     11.7       Hematocrit 36.9  32.8     38.2       Platelets 529  447     486       Hemoglobin A1C 6.90  6.7           Details          This result is from an external source.                           Assessment and Plan    Diagnoses and all orders for this visit:    1. Weakness (Primary)  -     Renal Function Panel; Future  -     CBC (No Diff); Future  -     Magnesium; Future    2. DM type 2 with diabetic dyslipidemia    3. Chronic HFrEF nonischemic    4. Hyperlipidemia LDL goal <100    5. Bilateral leg edema    6. Torticollis    7. Class 1 obesity due to excess calories with serious comorbidity and body mass index (BMI) of 30.0 to 30.9 in adult    Other orders  -     Discontinue: metOLazone (ZAROXOLYN) 5 MG tablet; Take 1 tablet by mouth 2 (Two) Times a Day.  Dispense: 60 tablet; Refill: 2  -     glimepiride (AMARYL) 2 MG tablet; Take 1 tablet by mouth Every Morning Before Breakfast.  Dispense: 90 tablet; Refill: 3      Patient provided with compression stockings to help with improving leg edema apply more  pressure to legs advised to continue elevation at home as well and will prescribe metolazone in addition to Lasix to take as needed    Advise follow-up later in the week or next or sooner if symptoms continue to persist or swelling is not improved in the next 1 to 2 days    Patient states blood sugars are still little off and was encouraged to restart on glimepiride, we discussed risk and benefits of this medication long-term given her age risk factors and risk of hypoglycemia but advised at least cut down on dose and take only with meals      Follow Up   Return in 1 week (on 3/12/2024), or if symptoms worsen or fail to improve, for Next scheduled follow up, Recheck later this week or tomorrow on swelling and compressoin stockings.  Patient was given instructions and counseling regarding her condition or for health maintenance advice. Please see specific information pulled into the AVS if appropriate.     Transcribed from ambient dictation for Sanchez Valenzuela DO by Sanchez Valenzuela DO.  03/13/24   12:50 EDT

## 2024-03-15 ENCOUNTER — TRANSCRIBE ORDERS (OUTPATIENT)
Dept: VASCULAR SURGERY | Facility: HOSPITAL | Age: 74
End: 2024-03-15
Payer: MEDICARE

## 2024-03-15 DIAGNOSIS — I71.43 INFRARENAL ABDOMINAL AORTIC ANEURYSM (AAA) WITHOUT RUPTURE: Primary | ICD-10-CM

## 2024-04-09 ENCOUNTER — OFFICE VISIT (OUTPATIENT)
Dept: FAMILY MEDICINE CLINIC | Facility: CLINIC | Age: 74
End: 2024-04-09
Payer: MEDICARE

## 2024-04-09 VITALS
HEART RATE: 66 BPM | SYSTOLIC BLOOD PRESSURE: 108 MMHG | RESPIRATION RATE: 16 BRPM | OXYGEN SATURATION: 92 % | WEIGHT: 185.2 LBS | BODY MASS INDEX: 30.86 KG/M2 | DIASTOLIC BLOOD PRESSURE: 60 MMHG | HEIGHT: 65 IN | TEMPERATURE: 97.3 F

## 2024-04-09 DIAGNOSIS — E11.69 DM TYPE 2 WITH DIABETIC DYSLIPIDEMIA: ICD-10-CM

## 2024-04-09 DIAGNOSIS — E03.9 HYPOTHYROIDISM, UNSPECIFIED TYPE: Primary | ICD-10-CM

## 2024-04-09 DIAGNOSIS — E78.5 DM TYPE 2 WITH DIABETIC DYSLIPIDEMIA: ICD-10-CM

## 2024-04-09 PROCEDURE — 99213 OFFICE O/P EST LOW 20 MIN: CPT | Performed by: FAMILY MEDICINE

## 2024-04-09 PROCEDURE — 3044F HG A1C LEVEL LT 7.0%: CPT | Performed by: FAMILY MEDICINE

## 2024-04-09 PROCEDURE — 1159F MED LIST DOCD IN RCRD: CPT | Performed by: FAMILY MEDICINE

## 2024-04-09 PROCEDURE — 3074F SYST BP LT 130 MM HG: CPT | Performed by: FAMILY MEDICINE

## 2024-04-09 PROCEDURE — 1160F RVW MEDS BY RX/DR IN RCRD: CPT | Performed by: FAMILY MEDICINE

## 2024-04-09 PROCEDURE — 3078F DIAST BP <80 MM HG: CPT | Performed by: FAMILY MEDICINE

## 2024-04-09 RX ORDER — LEVOTHYROXINE SODIUM 0.12 MG/1
125 TABLET ORAL DAILY
Qty: 30 TABLET | Refills: 5 | Status: SHIPPED | OUTPATIENT
Start: 2024-04-09

## 2024-04-09 NOTE — PROGRESS NOTES
"Chief Complaint  Diabetes (Wanted to discuss Mounjaro. Pt has been trying to lessen her feet swelling. Pt states she does not want to take Lasix everyday because it makes her weak. ), Hypertension (Needs to discuss blood pressure medications. ), and Hand Pain (Pt is having pain in hands/fingers at night. )    Subjective          Deisy Lama presents to [facility]  Diabetes    Hypertension    Hand Pain       Patient presents to follow-up on diabetes close like to discuss Mounjaro and medicine to help with foot swelling        Objective   Vital Signs:   /60 (BP Location: Right arm, Patient Position: Sitting, Cuff Size: Adult)   Pulse 66   Temp 97.3 °F (36.3 °C)   Resp 16   Ht 165.1 cm (65\")   Wt 84 kg (185 lb 3.2 oz)   SpO2 92%   BMI 30.82 kg/m²            Physical Exam  Vitals reviewed.   Constitutional:       Appearance: Normal appearance. She is well-developed.   HENT:      Head: Normocephalic and atraumatic.      Right Ear: External ear normal.      Left Ear: External ear normal.      Nose: Nose normal.   Eyes:      Conjunctiva/sclera: Conjunctivae normal.      Pupils: Pupils are equal, round, and reactive to light.   Cardiovascular:      Rate and Rhythm: Normal rate.   Pulmonary:      Effort: Pulmonary effort is normal.      Breath sounds: Normal breath sounds.   Abdominal:      General: There is no distension.   Skin:     General: Skin is warm and dry.   Neurological:      Mental Status: She is alert and oriented to person, place, and time. Mental status is at baseline.   Psychiatric:         Mood and Affect: Mood and affect normal.         Behavior: Behavior normal.         Thought Content: Thought content normal.         Judgment: Judgment normal.          Result Review :   The following data was reviewed by: Sanchez Valenzuela DO on 04/09/2024:  Common labs          1/30/2024    12:37 2/3/2024    05:02 2/5/2024    05:50   Common Labs   Glucose 70      BUN 43    "   Creatinine 1.62      Sodium 146      Potassium 4.3      Chloride 110      Calcium 9.5      Albumin 3.6   3.2       Total Bilirubin 0.3   0.4       Alkaline Phosphatase 135   104       AST (SGOT) 14   16       ALT (SGPT) 16   <5       WBC 12.49  12.17     11.58       Hemoglobin 11.9  10.5     11.7       Hematocrit 36.9  32.8     38.2       Platelets 529  447     486       Hemoglobin A1C 6.90  6.7           Details          This result is from an external source.                           Assessment and Plan    Diagnoses and all orders for this visit:    1. Hypothyroidism, unspecified type (Primary)  -     levothyroxine (SYNTHROID, LEVOTHROID) 125 MCG tablet; Take 1 tablet by mouth Daily.  Dispense: 30 tablet; Refill: 5    2. DM type 2 with diabetic dyslipidemia  -     Tirzepatide (MOUNJARO) 2.5 MG/0.5ML solution pen-injector pen; Inject 0.5 mL under the skin into the appropriate area as directed 1 (One) Time Per Week.  Dispense: 2 mL; Refill: 0      Will start Mounjaro for diabetes and weight loss renew thyroid medicine for patient as above and review medicine to see if something besides Lasix will help with swelling    Leg elevation recommended      Follow Up   Return in about 4 weeks (around 5/7/2024), or if symptoms worsen or fail to improve, for Labs before, Recheck.  Patient was given instructions and counseling regarding her condition or for health maintenance advice. Please see specific information pulled into the AVS if appropriate.     Transcribed from ambient dictation for Sanchez Valenzuela DO by Sanchez Valenzuela DO.  04/09/24   15:01 EDT

## 2024-04-25 ENCOUNTER — OFFICE VISIT (OUTPATIENT)
Dept: FAMILY MEDICINE CLINIC | Facility: CLINIC | Age: 74
End: 2024-04-25
Payer: MEDICARE

## 2024-04-25 VITALS
RESPIRATION RATE: 18 BRPM | WEIGHT: 179.4 LBS | HEART RATE: 70 BPM | DIASTOLIC BLOOD PRESSURE: 76 MMHG | OXYGEN SATURATION: 95 % | BODY MASS INDEX: 29.89 KG/M2 | HEIGHT: 65 IN | SYSTOLIC BLOOD PRESSURE: 121 MMHG | TEMPERATURE: 98.7 F

## 2024-04-25 DIAGNOSIS — E78.2 MIXED HYPERLIPIDEMIA: Chronic | ICD-10-CM

## 2024-04-25 DIAGNOSIS — Z23 NEED FOR VACCINATION AGAINST STREPTOCOCCUS PNEUMONIAE: ICD-10-CM

## 2024-04-25 DIAGNOSIS — R53.1 WEAKNESS: ICD-10-CM

## 2024-04-25 DIAGNOSIS — R60.0 BILATERAL LEG EDEMA: ICD-10-CM

## 2024-04-25 DIAGNOSIS — E78.5 DM TYPE 2 WITH DIABETIC DYSLIPIDEMIA: Primary | Chronic | ICD-10-CM

## 2024-04-25 DIAGNOSIS — E66.3 OVERWEIGHT (BMI 25.0-29.9): Chronic | ICD-10-CM

## 2024-04-25 DIAGNOSIS — E03.9 HYPOTHYROIDISM, UNSPECIFIED TYPE: Chronic | ICD-10-CM

## 2024-04-25 DIAGNOSIS — I50.22 CHRONIC HFREF (HEART FAILURE WITH REDUCED EJECTION FRACTION): Chronic | ICD-10-CM

## 2024-04-25 DIAGNOSIS — E11.69 DM TYPE 2 WITH DIABETIC DYSLIPIDEMIA: Primary | Chronic | ICD-10-CM

## 2024-04-25 DIAGNOSIS — I10 ESSENTIAL HYPERTENSION: Chronic | ICD-10-CM

## 2024-04-25 PROCEDURE — G0009 ADMIN PNEUMOCOCCAL VACCINE: HCPCS | Performed by: FAMILY MEDICINE

## 2024-04-25 PROCEDURE — 3044F HG A1C LEVEL LT 7.0%: CPT | Performed by: FAMILY MEDICINE

## 2024-04-25 PROCEDURE — 3078F DIAST BP <80 MM HG: CPT | Performed by: FAMILY MEDICINE

## 2024-04-25 PROCEDURE — 1126F AMNT PAIN NOTED NONE PRSNT: CPT | Performed by: FAMILY MEDICINE

## 2024-04-25 PROCEDURE — 3074F SYST BP LT 130 MM HG: CPT | Performed by: FAMILY MEDICINE

## 2024-04-25 PROCEDURE — 90677 PCV20 VACCINE IM: CPT | Performed by: FAMILY MEDICINE

## 2024-04-25 PROCEDURE — 99214 OFFICE O/P EST MOD 30 MIN: CPT | Performed by: FAMILY MEDICINE

## 2024-05-02 RX ORDER — LEVOTHYROXINE SODIUM 0.1 MG/1
100 TABLET ORAL DAILY
Qty: 90 TABLET | Refills: 1 | Status: SHIPPED | OUTPATIENT
Start: 2024-05-02

## 2024-05-05 PROBLEM — E66.3 OVERWEIGHT (BMI 25.0-29.9): Status: ACTIVE | Noted: 2024-05-05

## 2024-05-05 PROBLEM — E03.9 HYPOTHYROIDISM: Status: ACTIVE | Noted: 2023-02-16

## 2024-05-06 ENCOUNTER — TELEPHONE (OUTPATIENT)
Dept: FAMILY MEDICINE CLINIC | Facility: CLINIC | Age: 74
End: 2024-05-06
Payer: MEDICARE

## 2024-05-06 DIAGNOSIS — E78.5 DM TYPE 2 WITH DIABETIC DYSLIPIDEMIA: Chronic | ICD-10-CM

## 2024-05-06 DIAGNOSIS — E11.69 DM TYPE 2 WITH DIABETIC DYSLIPIDEMIA: Chronic | ICD-10-CM

## 2024-05-06 NOTE — PROGRESS NOTES
"Chief Complaint  Fatigue and Follow-up    Subjective          Deisy Lama presents to [facility]  Fatigue  Associated symptoms include fatigue.     Presents complaining of fatigue, blood pressure controlled, has lost 6 lb since last visit, she is motivated to lose weight and get BMI <27-28, she feels that would help her feel better and less fatigued. She has diabetes, rx a sulfonurea in the past and have advised weekly injectable would be safer if able to get covered.     Last A1c 2/2024 was 6.7 well controlled. She is taking several medications including synthroid 100mcg, several for her heart including a few water pills, advised caution with taking and making sure she stays hydrated as these can easily dehydrate if she doesn't routinely drink a lot of water.       Objective   Vital Signs:   /76 (BP Location: Right arm, Patient Position: Sitting, Cuff Size: Adult)   Pulse 70   Temp 98.7 °F (37.1 °C)   Resp 18   Ht 165.1 cm (65\")   Wt 81.4 kg (179 lb 6.4 oz)   SpO2 95%   BMI 29.85 kg/m²            Physical Exam  Vitals reviewed.   Constitutional:       Appearance: Normal appearance. She is well-developed.   HENT:      Head: Normocephalic and atraumatic.      Right Ear: External ear normal.      Left Ear: External ear normal.      Nose: Nose normal.   Eyes:      Conjunctiva/sclera: Conjunctivae normal.      Pupils: Pupils are equal, round, and reactive to light.   Cardiovascular:      Rate and Rhythm: Normal rate.   Pulmonary:      Effort: Pulmonary effort is normal.      Breath sounds: Normal breath sounds.   Abdominal:      General: There is no distension.   Skin:     General: Skin is warm and dry.   Neurological:      Mental Status: She is alert and oriented to person, place, and time. Mental status is at baseline.   Psychiatric:         Mood and Affect: Mood and affect normal.         Behavior: Behavior normal.         Thought Content: Thought content normal.    "      Judgment: Judgment normal.          Result Review :   The following data was reviewed by: Sanchez Valenzuela DO on 04/25/2024:  Common labs          1/30/2024    12:37 2/3/2024    05:02 2/5/2024    05:50   Common Labs   Glucose 70      BUN 43      Creatinine 1.62      Sodium 146      Potassium 4.3      Chloride 110      Calcium 9.5      Albumin 3.6   3.2       Total Bilirubin 0.3   0.4       Alkaline Phosphatase 135   104       AST (SGOT) 14   16       ALT (SGPT) 16   <5       WBC 12.49  12.17     11.58       Hemoglobin 11.9  10.5     11.7       Hematocrit 36.9  32.8     38.2       Platelets 529  447     486       Hemoglobin A1C 6.90  6.7           Details          This result is from an external source.                           Assessment and Plan    Diagnoses and all orders for this visit:    1. DM type 2 with diabetic dyslipidemia (Primary)  -     Tirzepatide (MOUNJARO) 5 MG/0.5ML solution pen-injector pen; Inject 0.5 mL under the skin into the appropriate area as directed 1 (One) Time Per Week.  Dispense: 2 mL; Refill: 2  -     Hemoglobin A1c; Future  -     Comprehensive Metabolic Panel; Future  -     Lipid Panel; Future  -     Vitamin B12 & Folate; Future  -     CBC (No Diff); Future  -     TSH+Free T4; Future    2. Overweight (BMI 25.0-29.9)    3. Need for vaccination against Streptococcus pneumoniae  -     Pneumococcal Conjugate Vaccine 20-Valent (PCV20)    4. Hypothyroidism, unspecified type    5. Bilateral leg edema    6. Chronic HFrEF nonischemic    7. Weakness  -     Magnesium  -     CBC (No Diff)  -     Renal Function Panel    8. Essential hypertension  -     Lipid panel    9. Mixed hyperlipidemia      Rx mounjaro to manage weight, diet, diabetes  Recheck A1c at next follow up, last was 6.7 on 2/2024    Monitor weight, fluid intake and kidney output on several  diuretics, bp at goal today <140/90 and taking for chronic heart failure, stable overall follows with cardiollogy    Recheck labs every 3 months  or so for above chronic conditions including hypothyroidism, on medicine no change in symptoms but last free T4 on 1/2024 was 2.76, recheck before next visit      Follow Up   Return in about 4 weeks (around 5/23/2024), or if symptoms worsen or fail to improve, for Next scheduled follow up, Recheck.  Patient was given instructions and counseling regarding her condition or for health maintenance advice. Please see specific information pulled into the AVS if appropriate.     Transcribed from ambient dictation for Sanchez Valenzuela DO by Sanchez Valenzuela DO.  05/05/24   20:36 EDT

## 2024-05-07 ENCOUNTER — OFFICE VISIT (OUTPATIENT)
Dept: VASCULAR SURGERY | Facility: HOSPITAL | Age: 74
End: 2024-05-07
Payer: MEDICARE

## 2024-05-07 ENCOUNTER — HOSPITAL ENCOUNTER (OUTPATIENT)
Dept: CARDIOLOGY | Facility: HOSPITAL | Age: 74
Discharge: HOME | End: 2024-05-07
Payer: MEDICARE

## 2024-05-07 VITALS
DIASTOLIC BLOOD PRESSURE: 74 MMHG | SYSTOLIC BLOOD PRESSURE: 132 MMHG | TEMPERATURE: 98 F | HEART RATE: 55 BPM | OXYGEN SATURATION: 93 % | RESPIRATION RATE: 18 BRPM

## 2024-05-07 DIAGNOSIS — I71.43 INFRARENAL ABDOMINAL AORTIC ANEURYSM (AAA) WITHOUT RUPTURE: Primary | ICD-10-CM

## 2024-05-07 DIAGNOSIS — I71.43 INFRARENAL ABDOMINAL AORTIC ANEURYSM (AAA) WITHOUT RUPTURE: ICD-10-CM

## 2024-05-07 LAB
ABDOMINAL DIST AORTA AP: 2.15 CM
ABDOMINAL DIST AORTA TRANS: 2.17 CM
ABDOMINAL LT COM ILIAC AP: 1.22 CM
ABDOMINAL LT COM ILIAC TRANS: 1.05 CM
ABDOMINAL MID AORTA AP: 4 CM
ABDOMINAL MID AORTA TRANS: 4.46 CM
ABDOMINAL PROX AORTA AP: 1.93 CM
ABDOMINAL PROX AORTA TRANS: 2.19 CM
ABDOMINAL RT COM ILIAC AP: 1.22 CM
ABDOMINAL RT COM ILIAC TRANS: 1.29 CM

## 2024-05-07 PROCEDURE — 93978 VASCULAR STUDY: CPT

## 2024-05-08 DIAGNOSIS — E78.5 DM TYPE 2 WITH DIABETIC DYSLIPIDEMIA: Chronic | ICD-10-CM

## 2024-05-08 DIAGNOSIS — E11.69 DM TYPE 2 WITH DIABETIC DYSLIPIDEMIA: Chronic | ICD-10-CM

## 2024-05-14 ENCOUNTER — TELEPHONE (OUTPATIENT)
Dept: FAMILY MEDICINE CLINIC | Facility: CLINIC | Age: 74
End: 2024-05-14
Payer: MEDICARE

## 2024-05-14 NOTE — TELEPHONE ENCOUNTER
Caller: [facility] - [facility] [address] - [phone] [facility] [phone] FX    Relationship: Pharmacy HARRIET[facility] DOLL     Best call back number: [phone]     Requested Prescriptions:   Requested Prescriptions     Pending Prescriptions Disp Refills    hydroCHLOROthiazide 25 MG tablet       Sig: Take 1 tablet by mouth Daily.    spironolactone (ALDACTONE) 50 MG tablet 90 tablet 3     Sig: Take 1 tablet by mouth Daily.    diclofenac (VOLTAREN) 75 MG EC tablet       Sig: Take 1 tablet by mouth 2 (Two) Times a Day.    escitalopram (LEXAPRO) 10 MG tablet       Sig: Take 1 tablet by mouth Daily.    levocetirizine (XYZAL) 5 MG tablet 30 tablet 2     Sig: Take 1 tablet by mouth Every Evening.    losartan (COZAAR) 100 MG tablet       Sig: Take 1 tablet by mouth Daily.    metOLazone (ZAROXOLYN) 5 MG tablet 60 tablet 5     Sig: Take 1 tablet by mouth 2 (Two) Times a Day As Needed (swelling).        Pharmacy where request should be sent:      Last office visit with prescribing clinician: 4/25/2024   Last telemedicine visit with prescribing clinician: Visit date not found   Next office visit with prescribing clinician: 5/23/2024     Additional details provided by patient: PATIENT WILL BE CONTINUING THE ABOVE MEDICATIONS, AND REFILLS NEEDED TO [facility].     Does the patient have less than a 3 day supply:  [x] Yes  [] No    Would you like a call back once the refill request has been completed: [x] Yes [] No    If the office needs to give you a call back, can they leave a voicemail: [x] Yes [] No    González Serna Rep   05/14/24 16:26 EDT

## 2024-05-14 NOTE — TELEPHONE ENCOUNTER
Caller: Deisy Lama    Relationship to patient: Self    Best call back number:     [phone] (Mobile)       Patient is needing: PATIENT CALLED IN AND IS REQUESTING  PRESCRIPTION FOR Tirzepatide (MOUNJARO) 2.5 MG/0.5ML solution pen-injector pen BE SENT BACK TO   [facility] [address] [place] [address] - [address] [facility] - [phone] [facility] [phone]  [phone]   PATIENT SAID THEY DON'T HAVE 5.0 MG BUT DO HAVE 2.5 MG MOUNJARO AND SHE WAS UNSURE IF THE PRESCRIPTION [facility] IN [place] HAD WAS STILL VALID. PATIENT IS REQUESTING A CALL BACK PLEASE.

## 2024-05-14 NOTE — TELEPHONE ENCOUNTER
Nitza sent refill requests for patients medications.  I called to verify she was switching pharmacies,  she would like to hold off on this for now.  She was going to use them for Mounjaro but they told her they didn't have it in stock.

## 2024-05-15 RX ORDER — LOSARTAN POTASSIUM 100 MG/1
100 TABLET ORAL DAILY
Qty: 90 TABLET | Refills: 1 | Status: SHIPPED | OUTPATIENT
Start: 2024-05-15

## 2024-05-15 RX ORDER — LEVOCETIRIZINE DIHYDROCHLORIDE 5 MG/1
5 TABLET, FILM COATED ORAL EVERY EVENING
Qty: 30 TABLET | Refills: 2 | Status: SHIPPED | OUTPATIENT
Start: 2024-05-15

## 2024-05-15 RX ORDER — SPIRONOLACTONE 50 MG/1
50 TABLET, FILM COATED ORAL DAILY
Qty: 90 TABLET | Refills: 3 | Status: SHIPPED | OUTPATIENT
Start: 2024-05-15

## 2024-05-15 RX ORDER — DICLOFENAC SODIUM 75 MG/1
75 TABLET, DELAYED RELEASE ORAL 2 TIMES DAILY
Qty: 30 TABLET | Refills: 1 | Status: SHIPPED | OUTPATIENT
Start: 2024-05-15

## 2024-05-15 RX ORDER — HYDROCHLOROTHIAZIDE 25 MG/1
25 TABLET ORAL DAILY
Qty: 90 TABLET | Refills: 1 | Status: SHIPPED | OUTPATIENT
Start: 2024-05-15

## 2024-05-15 RX ORDER — METOLAZONE 5 MG/1
5 TABLET ORAL 2 TIMES DAILY PRN
Qty: 60 TABLET | Refills: 5 | Status: SHIPPED | OUTPATIENT
Start: 2024-05-15

## 2024-05-15 RX ORDER — ESCITALOPRAM OXALATE 10 MG/1
10 TABLET ORAL DAILY
Qty: 90 TABLET | Refills: 1 | Status: SHIPPED | OUTPATIENT
Start: 2024-05-15

## 2024-05-23 ENCOUNTER — OFFICE VISIT (OUTPATIENT)
Dept: CARDIOLOGY | Facility: CLINIC | Age: 74
End: 2024-05-23
Payer: MEDICARE

## 2024-05-23 VITALS
WEIGHT: 175 LBS | HEART RATE: 69 BPM | HEIGHT: 65 IN | SYSTOLIC BLOOD PRESSURE: 116 MMHG | BODY MASS INDEX: 29.16 KG/M2 | DIASTOLIC BLOOD PRESSURE: 63 MMHG

## 2024-05-23 DIAGNOSIS — E78.5 HYPERLIPIDEMIA LDL GOAL <100: ICD-10-CM

## 2024-05-23 DIAGNOSIS — I50.22 CHRONIC HFREF (HEART FAILURE WITH REDUCED EJECTION FRACTION): Primary | ICD-10-CM

## 2024-05-23 DIAGNOSIS — Z95.810 PRESENCE OF BIVENTRICULAR IMPLANTABLE CARDIOVERTER-DEFIBRILLATOR (ICD): ICD-10-CM

## 2024-05-23 DIAGNOSIS — I10 ESSENTIAL HYPERTENSION: ICD-10-CM

## 2024-05-23 RX ORDER — METOPROLOL SUCCINATE 50 MG/1
50 TABLET, EXTENDED RELEASE ORAL DAILY
Qty: 90 TABLET | Refills: 3 | Status: SHIPPED | OUTPATIENT
Start: 2024-05-23

## 2024-05-23 RX ORDER — HYDROCODONE BITARTRATE AND ACETAMINOPHEN 7.5; 325 MG/1; MG/1
1 TABLET ORAL EVERY 4 HOURS PRN
COMMUNITY

## 2024-05-23 NOTE — ASSESSMENT & PLAN NOTE
Patient with normalization of ejection fraction on last echocardiogram no significant volume overload on exam given her generalized fatigue issues and her low normal blood pressure we will titrate down her Toprol to 50 daily

## 2024-05-23 NOTE — PROGRESS NOTES
Chief Complaint  Follow-up, Chronic HFrEF, Hypertension, and Hyperlipidemia    Subjective    Patient doing stable symptom wise no problems with shortness of breath she does complain of just weakness and fatigue issues as well as some lower extremity edema.  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    Diabetes mellitus     AVERAGE AM BS 'S    Glaucoma     Hiatal hernia     History of transfusion     LAST 2023 REPORTS NO TRANSFUSION REACTION POST SPINAL FUSION    Hyperlipidemia LDL goal <100 08/23/2021    Hypertension     Kidney stone     NO CURRENT ISSUES    Presence of biventricular implantable cardioverter-defibrillator (ICD) 08/23/2021    Sciatica     LEFT    Sleep apnea     Thyroid disorder     hypo    Wound of back     REPORTS HAS NOT HEALED SINCE BACK SURGERY AND IS FOLLOWED BY WOUND CARE         Current Outpatient Medications:     atorvastatin (LIPITOR) 10 MG tablet, Take 1 tablet by mouth Every Night., Disp: , Rfl:     brimonidine (ALPHAGAN) 0.2 % ophthalmic solution, Administer 1 drop to both eyes 2 (Two) Times a Day., Disp: , Rfl:     diclofenac (VOLTAREN) 75 MG EC tablet, Take 1 tablet by mouth 2 (Two) Times a Day., Disp: 30 tablet, Rfl: 1    dorzolamide (TRUSOPT) 2 % ophthalmic solution, Administer 1 drop to both eyes 2 (Two) Times a Day., Disp: , Rfl:     escitalopram (LEXAPRO) 10 MG tablet, Take 1 tablet by mouth Daily., Disp: 90 tablet, Rfl: 1    furosemide (LASIX) 40 MG tablet, Take 1 tablet by mouth Daily., Disp: 90 tablet, Rfl: 3    glucose blood test strip, Use to check blood sugar 2 times a day, Disp: 200 each, Rfl: 12     glucose monitor monitoring kit, Use 1 each As Needed (CHECK BLOOD GLUCOSE DAILY TO MONITOR BLOOD GLUCOSE)., Disp: 1 each, Rfl: 0    hydroCHLOROthiazide 25 MG tablet, Take 1 tablet by mouth Daily., Disp: 90 tablet, Rfl: 1    HYDROcodone-acetaminophen (NORCO) 7.5-325 MG per tablet, Take 1 tablet by mouth Every 4 (Four) Hours As Needed for Moderate Pain., Disp: , Rfl:     latanoprost (XALATAN) 0.005 % ophthalmic solution, Administer 1 drop to both eyes Every Night., Disp: , Rfl:     levocetirizine (XYZAL) 5 MG tablet, Take 1 tablet by mouth Every Evening., Disp: 30 tablet, Rfl: 2    levothyroxine (SYNTHROID, LEVOTHROID) 100 MCG tablet, TAKE 1 TABLET BY MOUTH DAILY, Disp: 90 tablet, Rfl: 1    levothyroxine (SYNTHROID, LEVOTHROID) 125 MCG tablet, Take 1 tablet by mouth Daily., Disp: 30 tablet, Rfl: 5    losartan (COZAAR) 100 MG tablet, Take 1 tablet by mouth Daily., Disp: 90 tablet, Rfl: 1    metOLazone (ZAROXOLYN) 5 MG tablet, Take 1 tablet by mouth 2 (Two) Times a Day As Needed (swelling)., Disp: 60 tablet, Rfl: 5    omeprazole (priLOSEC) 40 MG capsule, Take 1 capsule by mouth Daily., Disp: 90 capsule, Rfl: 3    rOPINIRole (REQUIP) 4 MG tablet, Take 1 tablet by mouth Daily With Breakfast & Lunch., Disp: , Rfl:     spironolactone (ALDACTONE) 50 MG tablet, Take 1 tablet by mouth Daily., Disp: 90 tablet, Rfl: 3    Tirzepatide (MOUNJARO) 2.5 MG/0.5ML solution pen-injector pen, Inject 0.5 mL under the skin into the appropriate area as directed 1 (One) Time Per Week., Disp: 2 mL, Rfl: 0    [START ON 5/28/2024] Tirzepatide (MOUNJARO) 5 MG/0.5ML solution pen-injector pen, Inject 0.5 mL under the skin into the appropriate area as directed 1 (One) Time Per Week., Disp: 2 mL, Rfl: 2    vitamin D (ERGOCALCIFEROL) 1.25 MG (14405 UT) capsule capsule, Take 1 capsule by mouth 1 (One) Time Per Week., Disp: , Rfl:     metoprolol succinate XL (TOPROL-XL) 50 MG 24 hr tablet, Take 1 tablet by mouth Daily., Disp: 90 tablet, Rfl:  "3    Medications Discontinued During This Encounter   Medication Reason    metoprolol succinate XL (TOPROL-XL) 50 MG 24 hr tablet      Allergies   Allergen Reactions    Morphine Itching    Dilaudid [Hydromorphone] Confusion    Latex Itching    Lisinopril Cough        Social History     Tobacco Use    Smoking status: Never     Passive exposure: Never    Smokeless tobacco: Never   Vaping Use    Vaping status: Never Used   Substance Use Topics    Alcohol use: Never    Drug use: Never       Family History   Problem Relation Age of Onset    Stroke Mother     Diabetes Mother     Arthritis Mother     Osteoporosis Mother     Heart disease Father     Cancer Father         PROSTATE    Arthritis Father     Osteoporosis Father     Diabetes Brother     Cancer Son     Malig Hyperthermia Neg Hx         Objective     /63   Pulse 69   Ht 165.1 cm (65\")   Wt 79.4 kg (175 lb)   BMI 29.12 kg/m²       Physical Exam    General Appearance:   no acute distress  Alert and oriented x3  HENT:   lips not cyanotic  Atraumatic  Neck:  No jvd   supple  Respiratory:  no respiratory distress  normal breath sounds  no rales  Cardiovascular:  Regular rate and rhythm  no S3, no S4   no murmur  no rub  Extremities  No cyanosis  lower extremity edema: +1  Skin:   warm, dry  No rashes      Result Review :     proBNP   Date Value Ref Range Status   01/30/2024 998.0 0.0 - 1,800.0 pg/mL Final     CMP          1/11/2024    14:42 1/30/2024    12:37 2/5/2024    05:50   CMP   Glucose 133  70     BUN 49  43     Creatinine 1.80  1.62     EGFR 29.1  33.0     Sodium 144  146     Potassium 5.4  4.3     Chloride 111  110     Calcium 9.3  9.5     Total Protein 6.8  6.8  6.1       Albumin 3.6  3.6  3.2       Globulin 3.2  3.2  2.9       Total Bilirubin 0.3  0.3  0.4       Alkaline Phosphatase 106  135  104       AST (SGOT) 25  14  16       ALT (SGPT) 32  16  <5       Albumin/Globulin Ratio 1.1  1.1     BUN/Creatinine Ratio 27.2  26.5     Anion Gap 11.2  11.7  " "      Details          This result is from an external source.             CBC w/diff          1/30/2024    12:37 2/3/2024    05:02 2/5/2024    05:50   CBC w/Diff   WBC 12.49  12.17     11.58       RBC 4.27  3.61     4.15       Hemoglobin 11.9  10.5     11.7       Hematocrit 36.9  32.8     38.2       MCV 86.4  90.9     92.0       MCH 27.9  29.1     28.2       MCHC 32.2  32.0     30.6       RDW 12.9  13.7     13.8       Platelets 529  447     486       Neutrophil Rel % 62.6   54.6       Immature Granulocyte Rel % 0.3   1.0       Lymphocyte Rel % 19.1   25.1       Monocyte Rel % 9.8   12.0       Eosinophil Rel % 7.2   6.5       Basophil Rel % 1.0   0.8          Details          This result is from an external source.              Lab Results   Component Value Date    TSH 0.676 01/30/2024      Lab Results   Component Value Date    FREET4 2.67 (H) 01/30/2024      No results found for: \"DDIMERQUANT\"  Magnesium   Date Value Ref Range Status   01/11/2024 2.3 1.6 - 2.4 mg/dL Final      No results found for: \"DIGOXIN\"   Lab Results   Component Value Date    TROPONINT 39 (H) 10/28/2023           Lipid Panel          12/13/2023    11:50   Lipid Panel   Total Cholesterol 102    Triglycerides 111    HDL Cholesterol 33    VLDL Cholesterol 21    LDL Cholesterol  48    LDL/HDL Ratio 1.42      No results found for: \"POCTROP\"    Results for orders placed during the hospital encounter of 11/27/23    Adult Transthoracic Echo Complete W/ Cont if Necessary Per Protocol    Interpretation Summary    The study is technically adequate for diagnosis.    Left ventricular systolic function is normal. Calculated left ventricular EF = 58.2%    Left ventricular wall thickness is consistent with mild concentric hypertrophy.    Left ventricular diastolic function is consistent with (grade Ia w/high LAP) impaired relaxation.    The left atrial cavity is mildly dilated.    Mild aortic valve stenosis is present.                 Diagnoses and all orders " for this visit:    1. Chronic HFrEF nonischemic (Primary)  Assessment & Plan:  Patient with normalization of ejection fraction on last echocardiogram no significant volume overload on exam given her generalized fatigue issues and her low normal blood pressure we will titrate down her Toprol to 50 daily      2. Presence of biventricular implantable cardioverter-defibrillator (ICD)  Assessment & Plan:  Device working normally remote interrogation we will schedule an office check next visit in addition we will see if there is any way to decrease atrial pacing which is about a 10% right now in order to provide life given that threshold is chronically elevated      3. Essential hypertension  Assessment & Plan:  Blood pressure low normal side decreasing Toprol to 50 daily       4. Hyperlipidemia LDL goal <100    Other orders  -     metoprolol succinate XL (TOPROL-XL) 50 MG 24 hr tablet; Take 1 tablet by mouth Daily.  Dispense: 90 tablet; Refill: 3            Follow Up     Return in about 6 months (around 11/23/2024) for Follow with Shefali Chavez.          Patient was given instructions and counseling regarding her condition or for health maintenance advice. Please see specific information pulled into the AVS if appropriate.

## 2024-05-23 NOTE — ASSESSMENT & PLAN NOTE
Device working normally remote interrogation we will schedule an office check next visit in addition we will see if there is any way to decrease atrial pacing which is about a 10% right now in order to provide life given that threshold is chronically elevated

## 2024-05-28 ENCOUNTER — TELEPHONE (OUTPATIENT)
Dept: FAMILY MEDICINE CLINIC | Facility: CLINIC | Age: 74
End: 2024-05-28

## 2024-05-28 NOTE — TELEPHONE ENCOUNTER
Caller: Deisy Lama    Relationship: Self    Best call back number: [phone]     Which medication are you concerned about: MALLORIE    Who prescribed you this medication: DR LESTER    What are your concerns:     PATIENT WAS ON 2.5 MG DOSAGE AND TOOK LAST SHOT ON 5.24.2024. PATIENT NOW HAS RECEIVED THE 5 MG DOSE AND WAS WANTING TO KNOW IF SHE SHOULD GO AHEAD AND TAKE THAT NOW OR WAIT UNTIL FRIDAY'S SCHEDULE.     PLEASE CALL TO ADVISE    SIMON RAIN, CALL PREFERRED MAY LEAVE VOICEMAIL.

## 2024-05-30 ENCOUNTER — OFFICE VISIT (OUTPATIENT)
Dept: FAMILY MEDICINE CLINIC | Facility: CLINIC | Age: 74
End: 2024-05-30
Payer: MEDICARE

## 2024-05-30 ENCOUNTER — LAB (OUTPATIENT)
Dept: LAB | Facility: HOSPITAL | Age: 74
End: 2024-05-30
Payer: MEDICARE

## 2024-05-30 VITALS
DIASTOLIC BLOOD PRESSURE: 71 MMHG | TEMPERATURE: 97.5 F | RESPIRATION RATE: 16 BRPM | BODY MASS INDEX: 29.09 KG/M2 | OXYGEN SATURATION: 96 % | WEIGHT: 174.6 LBS | SYSTOLIC BLOOD PRESSURE: 129 MMHG | HEIGHT: 65 IN | HEART RATE: 68 BPM

## 2024-05-30 DIAGNOSIS — E03.9 HYPOTHYROIDISM, UNSPECIFIED TYPE: Primary | Chronic | ICD-10-CM

## 2024-05-30 DIAGNOSIS — I10 ESSENTIAL HYPERTENSION: Chronic | ICD-10-CM

## 2024-05-30 DIAGNOSIS — E78.5 DM TYPE 2 WITH DIABETIC DYSLIPIDEMIA: Chronic | ICD-10-CM

## 2024-05-30 DIAGNOSIS — E11.69 DM TYPE 2 WITH DIABETIC DYSLIPIDEMIA: Chronic | ICD-10-CM

## 2024-05-30 DIAGNOSIS — I50.22 CHRONIC HFREF (HEART FAILURE WITH REDUCED EJECTION FRACTION): Chronic | ICD-10-CM

## 2024-05-30 DIAGNOSIS — E78.2 MIXED HYPERLIPIDEMIA: Chronic | ICD-10-CM

## 2024-05-30 LAB
CHOLEST SERPL-MCNC: 119 MG/DL (ref 0–200)
DEPRECATED RDW RBC AUTO: 44 FL (ref 37–54)
ERYTHROCYTE [DISTWIDTH] IN BLOOD BY AUTOMATED COUNT: 13.5 % (ref 12.3–15.4)
FOLATE SERPL-MCNC: 11.2 NG/ML (ref 4.78–24.2)
HBA1C MFR BLD: 6.4 % (ref 4.8–5.6)
HCT VFR BLD AUTO: 39.7 % (ref 34–46.6)
HDLC SERPL-MCNC: 30 MG/DL (ref 40–60)
HGB BLD-MCNC: 12.8 G/DL (ref 12–15.9)
LDLC SERPL CALC-MCNC: 70 MG/DL (ref 0–100)
LDLC/HDLC SERPL: 2.31 {RATIO}
MAGNESIUM SERPL-MCNC: 2.8 MG/DL (ref 1.6–2.4)
MCH RBC QN AUTO: 28.9 PG (ref 26.6–33)
MCHC RBC AUTO-ENTMCNC: 32.2 G/DL (ref 31.5–35.7)
MCV RBC AUTO: 89.6 FL (ref 79–97)
PHOSPHATE SERPL-MCNC: 4 MG/DL (ref 2.5–4.5)
PLATELET # BLD AUTO: 488 10*3/MM3 (ref 140–450)
PMV BLD AUTO: 10.2 FL (ref 6–12)
RBC # BLD AUTO: 4.43 10*6/MM3 (ref 3.77–5.28)
T4 FREE SERPL-MCNC: 1.65 NG/DL (ref 0.92–1.68)
TRIGL SERPL-MCNC: 99 MG/DL (ref 0–150)
TSH SERPL DL<=0.05 MIU/L-ACNC: 0.24 UIU/ML (ref 0.27–4.2)
VIT B12 BLD-MCNC: 369 PG/ML (ref 211–946)
VLDLC SERPL-MCNC: 19 MG/DL (ref 5–40)
WBC NRBC COR # BLD AUTO: 11.72 10*3/MM3 (ref 3.4–10.8)

## 2024-05-30 PROCEDURE — 85027 COMPLETE CBC AUTOMATED: CPT | Performed by: FAMILY MEDICINE

## 2024-05-30 PROCEDURE — 3078F DIAST BP <80 MM HG: CPT | Performed by: FAMILY MEDICINE

## 2024-05-30 PROCEDURE — 3074F SYST BP LT 130 MM HG: CPT | Performed by: FAMILY MEDICINE

## 2024-05-30 PROCEDURE — 99214 OFFICE O/P EST MOD 30 MIN: CPT | Performed by: FAMILY MEDICINE

## 2024-05-30 PROCEDURE — 83735 ASSAY OF MAGNESIUM: CPT | Performed by: FAMILY MEDICINE

## 2024-05-30 PROCEDURE — 84443 ASSAY THYROID STIM HORMONE: CPT

## 2024-05-30 PROCEDURE — 1160F RVW MEDS BY RX/DR IN RCRD: CPT | Performed by: FAMILY MEDICINE

## 2024-05-30 PROCEDURE — 1126F AMNT PAIN NOTED NONE PRSNT: CPT | Performed by: FAMILY MEDICINE

## 2024-05-30 PROCEDURE — 83036 HEMOGLOBIN GLYCOSYLATED A1C: CPT

## 2024-05-30 PROCEDURE — 82746 ASSAY OF FOLIC ACID SERUM: CPT

## 2024-05-30 PROCEDURE — 84439 ASSAY OF FREE THYROXINE: CPT

## 2024-05-30 PROCEDURE — 36415 COLL VENOUS BLD VENIPUNCTURE: CPT

## 2024-05-30 PROCEDURE — 80053 COMPREHEN METABOLIC PANEL: CPT

## 2024-05-30 PROCEDURE — 1159F MED LIST DOCD IN RCRD: CPT | Performed by: FAMILY MEDICINE

## 2024-05-30 PROCEDURE — 82607 VITAMIN B-12: CPT

## 2024-05-30 PROCEDURE — 80061 LIPID PANEL: CPT | Performed by: FAMILY MEDICINE

## 2024-05-30 PROCEDURE — 84100 ASSAY OF PHOSPHORUS: CPT | Performed by: FAMILY MEDICINE

## 2024-05-30 RX ORDER — PREDNISOLONE ACETATE 10 MG/ML
SUSPENSION/ DROPS OPHTHALMIC
COMMUNITY
Start: 2024-05-18

## 2024-05-30 RX ORDER — DORZOLAMIDE HYDROCHLORIDE AND TIMOLOL MALEATE 20; 5 MG/ML; MG/ML
SOLUTION/ DROPS OPHTHALMIC
COMMUNITY
Start: 2024-05-18

## 2024-05-30 NOTE — PROGRESS NOTES
"Chief Complaint  Fatigue (Has been feeling weak from water pills. ), Obesity, and Cyst (On left arm at elbow)    Subjective          Deisy Lama presents to [facility]  History of Present Illness    Patient presents to follow-up on fatigue feeling weak on water pills recommended her to cut back on some of these may be taking too much      Objective   Vital Signs:   /71 (BP Location: Left arm, Patient Position: Sitting, Cuff Size: Adult)   Pulse 68   Temp 97.5 °F (36.4 °C)   Resp 16   Ht 165.1 cm (65\")   Wt 79.2 kg (174 lb 9.6 oz)   SpO2 96%   BMI 29.05 kg/m²            Physical Exam  Vitals reviewed.   Constitutional:       Appearance: Normal appearance. She is well-developed.   HENT:      Head: Normocephalic and atraumatic.      Right Ear: External ear normal.      Left Ear: External ear normal.      Nose: Nose normal.   Eyes:      Conjunctiva/sclera: Conjunctivae normal.      Pupils: Pupils are equal, round, and reactive to light.   Cardiovascular:      Rate and Rhythm: Normal rate.   Pulmonary:      Effort: Pulmonary effort is normal.      Breath sounds: Normal breath sounds.   Abdominal:      General: There is no distension.   Skin:     General: Skin is warm and dry.   Neurological:      Mental Status: She is alert and oriented to person, place, and time. Mental status is at baseline.   Psychiatric:         Mood and Affect: Mood and affect normal.         Behavior: Behavior normal.         Thought Content: Thought content normal.         Judgment: Judgment normal.          Result Review :   The following data was reviewed by: Sanchez Valenzuela DO on 05/30/2024:  Common labs          2/5/2024    05:50 5/30/2024    11:03 6/6/2024    10:38   Common Labs   Glucose  104  103    BUN  62  44    Creatinine  2.47  1.69    Sodium  140  139    Potassium  5.0  4.2    Chloride  100  104    Calcium  9.9  9.7    Albumin 3.2     4.1  4.1    Total Bilirubin 0.4     0.3     Alkaline " Phosphatase 104     103     AST (SGOT) 16     20     ALT (SGPT) <5     25     WBC 11.58     11.72     Hemoglobin 11.7     12.8     Hematocrit 38.2     39.7     Platelets 486     488     Total Cholesterol  119     Triglycerides  99     HDL Cholesterol  30     LDL Cholesterol   70     Hemoglobin A1C  6.40        Details          This result is from an external source.                           Assessment and Plan    Diagnoses and all orders for this visit:    1. Hypothyroidism, unspecified type (Primary)    2. Chronic HFrEF nonischemic    3. Essential hypertension    4. Mixed hyperlipidemia      Labs  Hold hctz  Monitor bp   at home now from nursing home    Left elbow lipoma vs bursitis consider draining or US    Monitor fatigue        Follow Up   Return in about 2 weeks (around 6/13/2024), or if symptoms worsen or fail to improve, for Next scheduled follow up, Recheck.  Patient was given instructions and counseling regarding her condition or for health maintenance advice. Please see specific information pulled into the AVS if appropriate.     Transcribed from ambient dictation for Sanchez Valenzuela DO by Sanchez Valenzuela DO.  05/30/24   10:49 EDT

## 2024-05-31 DIAGNOSIS — N18.4 CKD (CHRONIC KIDNEY DISEASE) STAGE 4, GFR 15-29 ML/MIN: Primary | ICD-10-CM

## 2024-05-31 LAB
ALBUMIN SERPL-MCNC: 4.1 G/DL (ref 3.5–5.2)
ALBUMIN/GLOB SERPL: 1.3 G/DL
ALP SERPL-CCNC: 103 U/L (ref 39–117)
ALT SERPL W P-5'-P-CCNC: 25 U/L (ref 1–33)
ANION GAP SERPL CALCULATED.3IONS-SCNC: 11 MMOL/L (ref 5–15)
AST SERPL-CCNC: 20 U/L (ref 1–32)
BILIRUB SERPL-MCNC: 0.3 MG/DL (ref 0–1.2)
BUN SERPL-MCNC: 62 MG/DL (ref 8–23)
BUN/CREAT SERPL: 25.1 (ref 7–25)
CALCIUM SPEC-SCNC: 9.9 MG/DL (ref 8.6–10.5)
CHLORIDE SERPL-SCNC: 100 MMOL/L (ref 98–107)
CO2 SERPL-SCNC: 29 MMOL/L (ref 22–29)
CREAT SERPL-MCNC: 2.47 MG/DL (ref 0.57–1)
EGFRCR SERPLBLD CKD-EPI 2021: 19.8 ML/MIN/1.73
GLOBULIN UR ELPH-MCNC: 3.1 GM/DL
GLUCOSE SERPL-MCNC: 104 MG/DL (ref 65–99)
POTASSIUM SERPL-SCNC: 5 MMOL/L (ref 3.5–5.2)
PROT SERPL-MCNC: 7.2 G/DL (ref 6–8.5)
SODIUM SERPL-SCNC: 140 MMOL/L (ref 136–145)

## 2024-06-03 DIAGNOSIS — N17.9 AKI (ACUTE KIDNEY INJURY): Primary | ICD-10-CM

## 2024-06-06 ENCOUNTER — LAB (OUTPATIENT)
Dept: LAB | Facility: HOSPITAL | Age: 74
End: 2024-06-06
Payer: MEDICARE

## 2024-06-06 DIAGNOSIS — N17.9 AKI (ACUTE KIDNEY INJURY): ICD-10-CM

## 2024-06-06 LAB
ALBUMIN SERPL-MCNC: 4.1 G/DL (ref 3.5–5.2)
ANION GAP SERPL CALCULATED.3IONS-SCNC: 9 MMOL/L (ref 5–15)
BUN SERPL-MCNC: 44 MG/DL (ref 8–23)
BUN/CREAT SERPL: 26 (ref 7–25)
CALCIUM SPEC-SCNC: 9.7 MG/DL (ref 8.6–10.5)
CHLORIDE SERPL-SCNC: 104 MMOL/L (ref 98–107)
CO2 SERPL-SCNC: 26 MMOL/L (ref 22–29)
CREAT SERPL-MCNC: 1.69 MG/DL (ref 0.57–1)
EGFRCR SERPLBLD CKD-EPI 2021: 31.2 ML/MIN/1.73
GLUCOSE SERPL-MCNC: 103 MG/DL (ref 65–99)
PHOSPHATE SERPL-MCNC: 3.2 MG/DL (ref 2.5–4.5)
POTASSIUM SERPL-SCNC: 4.2 MMOL/L (ref 3.5–5.2)
SODIUM SERPL-SCNC: 139 MMOL/L (ref 136–145)

## 2024-06-06 PROCEDURE — 36415 COLL VENOUS BLD VENIPUNCTURE: CPT

## 2024-06-06 PROCEDURE — 80069 RENAL FUNCTION PANEL: CPT

## 2024-06-07 ENCOUNTER — TELEPHONE (OUTPATIENT)
Dept: FAMILY MEDICINE CLINIC | Facility: CLINIC | Age: 74
End: 2024-06-07
Payer: MEDICARE

## 2024-06-07 NOTE — TELEPHONE ENCOUNTER
Pt called asking about Renal Function Panal. Let pt know that it had not been reviewed yet and dr martinez was out till Monday. As soon as he looks at it we will call her. Pt verbalized understanding. Also advised pt to go ahead and book appt with Nephrology and then if she doesn't need it she can always cancel.

## 2024-06-11 PROBLEM — E78.2 MIXED HYPERLIPIDEMIA: Status: ACTIVE | Noted: 2021-08-23

## 2024-06-12 ENCOUNTER — OFFICE VISIT (OUTPATIENT)
Dept: FAMILY MEDICINE CLINIC | Facility: CLINIC | Age: 74
End: 2024-06-12
Payer: MEDICARE

## 2024-06-12 VITALS
BODY MASS INDEX: 28.19 KG/M2 | OXYGEN SATURATION: 96 % | HEIGHT: 65 IN | SYSTOLIC BLOOD PRESSURE: 102 MMHG | RESPIRATION RATE: 16 BRPM | WEIGHT: 169.2 LBS | TEMPERATURE: 97.3 F | DIASTOLIC BLOOD PRESSURE: 71 MMHG | HEART RATE: 78 BPM

## 2024-06-12 DIAGNOSIS — I50.22 CHRONIC HFREF (HEART FAILURE WITH REDUCED EJECTION FRACTION): Chronic | ICD-10-CM

## 2024-06-12 DIAGNOSIS — E78.5 DM TYPE 2 WITH DIABETIC DYSLIPIDEMIA: Chronic | ICD-10-CM

## 2024-06-12 DIAGNOSIS — E11.69 DM TYPE 2 WITH DIABETIC DYSLIPIDEMIA: Chronic | ICD-10-CM

## 2024-06-12 DIAGNOSIS — R60.0 BILATERAL LEG EDEMA: Chronic | ICD-10-CM

## 2024-06-12 DIAGNOSIS — E66.3 OVERWEIGHT (BMI 25.0-29.9): Primary | Chronic | ICD-10-CM

## 2024-06-12 PROCEDURE — 99214 OFFICE O/P EST MOD 30 MIN: CPT | Performed by: FAMILY MEDICINE

## 2024-06-12 PROCEDURE — 3078F DIAST BP <80 MM HG: CPT | Performed by: FAMILY MEDICINE

## 2024-06-12 PROCEDURE — 1126F AMNT PAIN NOTED NONE PRSNT: CPT | Performed by: FAMILY MEDICINE

## 2024-06-12 PROCEDURE — 3074F SYST BP LT 130 MM HG: CPT | Performed by: FAMILY MEDICINE

## 2024-06-12 NOTE — PROGRESS NOTES
"Chief Complaint  Obesity and Peripheral Neuropathy (In feet/Heaviness in feet. Feet feel \" gritty\")    Subjective          Deisy Lama presents to [facility]  Obesity    Peripheral Neuropathy        Patient presents to follow-up on weight loss obesity taking Mounjaro    Having issues with bilateral leg edema and swelling    Having complaints of neuropathy more so when she is on her feet for extended periods of time which is increased since her  has returned from nursing home    Objective   Vital Signs:   /71 (BP Location: Right arm, Patient Position: Sitting, Cuff Size: Adult)   Pulse 78   Temp 97.3 °F (36.3 °C)   Resp 16   Ht 165.1 cm (65\")   Wt 76.7 kg (169 lb 3.2 oz)   SpO2 96%   BMI 28.16 kg/m²            Physical Exam  Vitals reviewed.   Constitutional:       Appearance: Normal appearance. She is well-developed.   HENT:      Head: Normocephalic and atraumatic.      Right Ear: External ear normal.      Left Ear: External ear normal.      Nose: Nose normal.   Eyes:      Conjunctiva/sclera: Conjunctivae normal.      Pupils: Pupils are equal, round, and reactive to light.   Cardiovascular:      Rate and Rhythm: Normal rate.   Pulmonary:      Effort: Pulmonary effort is normal.      Breath sounds: Normal breath sounds.   Abdominal:      General: There is no distension.   Musculoskeletal:      Right lower leg: Edema present.      Left lower leg: Edema present.   Skin:     General: Skin is warm and dry.   Neurological:      Mental Status: She is alert and oriented to person, place, and time. Mental status is at baseline.   Psychiatric:         Mood and Affect: Mood and affect normal.         Behavior: Behavior normal.         Thought Content: Thought content normal.         Judgment: Judgment normal.          Result Review :   The following data was reviewed by: Sanchez Valenzuela DO on 06/12/2024:  Common labs          2/5/2024    05:50 5/30/2024    11:03 6/6/2024 "    10:38   Common Labs   Glucose  104  103    BUN  62  44    Creatinine  2.47  1.69    Sodium  140  139    Potassium  5.0  4.2    Chloride  100  104    Calcium  9.9  9.7    Albumin 3.2     4.1  4.1    Total Bilirubin 0.4     0.3     Alkaline Phosphatase 104     103     AST (SGOT) 16     20     ALT (SGPT) <5     25     WBC 11.58     11.72     Hemoglobin 11.7     12.8     Hematocrit 38.2     39.7     Platelets 486     488     Total Cholesterol  119     Triglycerides  99     HDL Cholesterol  30     LDL Cholesterol   70     Hemoglobin A1C  6.40        Details          This result is from an external source.                           Assessment and Plan    Diagnoses and all orders for this visit:    1. Overweight (BMI 25.0-29.9) (Primary)    2. Bilateral leg edema    3. Chronic HFrEF nonischemic    4. DM type 2 with diabetic dyslipidemia      Recheck kidney function was improved  Can increase Lasix to daily for the next week to improve with fluid and edema    Continue Mounjaro as prescribed until finished with 5 mg dose  Recheck A1c every 3 to 6 months    Continue leg elevation and treatment of neuropathy leg edema with medicines above as prescribed      Follow Up   Return in about 2 months (around 8/12/2024), or if symptoms worsen or fail to improve, for Next scheduled follow up, Recheck, Labs before.  Patient was given instructions and counseling regarding her condition or for health maintenance advice. Please see specific information pulled into the AVS if appropriate.     Transcribed from ambient dictation for Sanchez Valenzuela DO by Sanchez Valenzuela DO.  06/12/24   11:52 EDT

## 2024-07-18 ENCOUNTER — OFFICE VISIT (OUTPATIENT)
Dept: FAMILY MEDICINE CLINIC | Facility: CLINIC | Age: 74
End: 2024-07-18
Payer: MEDICARE

## 2024-07-18 VITALS
HEART RATE: 60 BPM | WEIGHT: 164 LBS | BODY MASS INDEX: 27.32 KG/M2 | OXYGEN SATURATION: 96 % | TEMPERATURE: 97.5 F | HEIGHT: 65 IN | DIASTOLIC BLOOD PRESSURE: 76 MMHG | SYSTOLIC BLOOD PRESSURE: 113 MMHG

## 2024-07-18 DIAGNOSIS — E11.69 DM TYPE 2 WITH DIABETIC DYSLIPIDEMIA: Chronic | ICD-10-CM

## 2024-07-18 DIAGNOSIS — E66.3 OVERWEIGHT (BMI 25.0-29.9): Chronic | ICD-10-CM

## 2024-07-18 DIAGNOSIS — N18.4 CKD (CHRONIC KIDNEY DISEASE) STAGE 4, GFR 15-29 ML/MIN: ICD-10-CM

## 2024-07-18 DIAGNOSIS — E78.5 DM TYPE 2 WITH DIABETIC DYSLIPIDEMIA: Chronic | ICD-10-CM

## 2024-07-18 DIAGNOSIS — I10 ESSENTIAL HYPERTENSION: Primary | Chronic | ICD-10-CM

## 2024-07-18 PROCEDURE — 1126F AMNT PAIN NOTED NONE PRSNT: CPT | Performed by: FAMILY MEDICINE

## 2024-07-18 PROCEDURE — 3074F SYST BP LT 130 MM HG: CPT | Performed by: FAMILY MEDICINE

## 2024-07-18 PROCEDURE — 99214 OFFICE O/P EST MOD 30 MIN: CPT | Performed by: FAMILY MEDICINE

## 2024-07-18 PROCEDURE — 3078F DIAST BP <80 MM HG: CPT | Performed by: FAMILY MEDICINE

## 2024-07-18 RX ORDER — ERGOCALCIFEROL 1.25 MG/1
50000 CAPSULE ORAL WEEKLY
Qty: 12 CAPSULE | Refills: 3 | Status: SHIPPED | OUTPATIENT
Start: 2024-07-18

## 2024-07-18 NOTE — PROGRESS NOTES
"Chief Complaint  Obesity (Follow up on Mounjaro) and Cyst (Seems to be a pocket of fluid moving up and down the spine, noticed 2wks ago )    Subjective        Deisy Lama presents to [facility]  History of Present Illness    Patient presents to follow-up on obesity diabetes, taking Mounjaro has been down 10 pounds versus more BMI 27    Having a small cyst on her back that seems to move has had past surgery that could be related but no redness warmth pain swelling fever or other    Objective   Vital Signs:  /76 (BP Location: Left arm, Patient Position: Sitting, Cuff Size: Adult)   Pulse 60   Temp 97.5 °F (36.4 °C)   Ht 165.1 cm (65\")   Wt 74.4 kg (164 lb)   SpO2 96%   BMI 27.29 kg/m²   Estimated body mass index is 27.29 kg/m² as calculated from the following:    Height as of this encounter: 165.1 cm (65\").    Weight as of this encounter: 74.4 kg (164 lb).               Physical Exam   Result Review :    The following data was reviewed by: Sanchez Valenzuela DO on 07/18/2024:  Common labs          2/5/2024    05:50 5/30/2024    11:03 6/6/2024    10:38   Common Labs   Glucose  104  103    BUN  62  44    Creatinine  2.47  1.69    Sodium  140  139    Potassium  5.0  4.2    Chloride  100  104    Calcium  9.9  9.7    Albumin 3.2     4.1  4.1    Total Bilirubin 0.4     0.3     Alkaline Phosphatase 104     103     AST (SGOT) 16     20     ALT (SGPT) <5     25     WBC 11.58     11.72     Hemoglobin 11.7     12.8     Hematocrit 38.2     39.7     Platelets 486     488     Total Cholesterol  119     Triglycerides  99     HDL Cholesterol  30     LDL Cholesterol   70     Hemoglobin A1C  6.40        Details          This result is from an external source.                          Assessment and Plan     Diagnoses and all orders for this visit:    1. Essential hypertension (Primary)    2. DM type 2 with diabetic dyslipidemia    3. CKD (chronic kidney disease) stage 4, GFR 15-29 ml/min  -  "    vitamin D (ERGOCALCIFEROL) 1.25 MG (48782 UT) capsule capsule; Take 1 capsule by mouth 1 (One) Time Per Week.  Dispense: 12 capsule; Refill: 3    4. Overweight (BMI 25.0-29.9)  -     Discontinue: Tirzepatide (MOUNJARO) 7.5 MG/0.5ML solution pen-injector pen; Inject 0.5 mL under the skin into the appropriate area as directed 1 (One) Time Per Week.  Dispense: 2 mL; Refill: 2  -     Tirzepatide (MOUNJARO) 7.5 MG/0.5ML solution pen-injector pen; Inject 0.5 mL under the skin into the appropriate area as directed 1 (One) Time Per Week.  Dispense: 2 mL; Refill: 2      Continue medicine for blood pressure at goal less than 140/90    Continue to manage weight with medication goal BMI under 27    Continue medicine for CKD and monitoring at least every 3 months recommend rechecking labs before next appointment in the next 2 months    Continue to manage hypothyroidism taking Synthroid as prescribed recheck TSH and T4 additionally before follow-up         Follow Up     Return in about 2 months (around 9/18/2024), or if symptoms worsen or fail to improve, for Next scheduled follow up, Recheck.  Patient was given instructions and counseling regarding her condition or for health maintenance advice. Please see specific information pulled into the AVS if appropriate.

## 2024-09-03 RX ORDER — OMEPRAZOLE 40 MG/1
40 CAPSULE, DELAYED RELEASE ORAL DAILY
Qty: 90 CAPSULE | Refills: 3 | Status: SHIPPED | OUTPATIENT
Start: 2024-09-03

## 2024-09-03 RX ORDER — SPIRONOLACTONE 50 MG/1
50 TABLET, FILM COATED ORAL DAILY
Qty: 90 TABLET | Refills: 3 | Status: SHIPPED | OUTPATIENT
Start: 2024-09-03

## 2024-09-03 RX ORDER — LOSARTAN POTASSIUM 100 MG/1
100 TABLET ORAL DAILY
Qty: 90 TABLET | Refills: 3 | Status: SHIPPED | OUTPATIENT
Start: 2024-09-03

## 2024-09-03 RX ORDER — METOPROLOL SUCCINATE 50 MG/1
50 TABLET, EXTENDED RELEASE ORAL DAILY
Qty: 90 TABLET | Refills: 3 | Status: SHIPPED | OUTPATIENT
Start: 2024-09-03

## 2024-09-03 RX ORDER — ESCITALOPRAM OXALATE 10 MG/1
10 TABLET ORAL DAILY
Qty: 90 TABLET | Refills: 3 | Status: SHIPPED | OUTPATIENT
Start: 2024-09-03

## 2024-09-09 RX ORDER — AMMONIUM LACTATE 12 G/100G
LOTION TOPICAL
COMMUNITY
Start: 2024-07-23

## 2024-09-10 ENCOUNTER — OFFICE VISIT (OUTPATIENT)
Dept: FAMILY MEDICINE CLINIC | Facility: CLINIC | Age: 74
End: 2024-09-10
Payer: MEDICARE

## 2024-09-10 VITALS
WEIGHT: 158.4 LBS | SYSTOLIC BLOOD PRESSURE: 105 MMHG | HEART RATE: 80 BPM | DIASTOLIC BLOOD PRESSURE: 69 MMHG | TEMPERATURE: 98 F | RESPIRATION RATE: 16 BRPM | OXYGEN SATURATION: 97 % | HEIGHT: 65 IN | BODY MASS INDEX: 26.39 KG/M2

## 2024-09-10 DIAGNOSIS — E11.69 DM TYPE 2 WITH DIABETIC DYSLIPIDEMIA: Primary | ICD-10-CM

## 2024-09-10 DIAGNOSIS — E78.2 MIXED HYPERLIPIDEMIA: ICD-10-CM

## 2024-09-10 DIAGNOSIS — E78.5 DM TYPE 2 WITH DIABETIC DYSLIPIDEMIA: Primary | ICD-10-CM

## 2024-09-10 DIAGNOSIS — N18.4 CKD (CHRONIC KIDNEY DISEASE) STAGE 4, GFR 15-29 ML/MIN: ICD-10-CM

## 2024-09-10 DIAGNOSIS — M25.522 LEFT ELBOW PAIN: ICD-10-CM

## 2024-09-10 DIAGNOSIS — I10 ESSENTIAL HYPERTENSION: ICD-10-CM

## 2024-09-10 LAB
EXPIRATION DATE: ABNORMAL
HBA1C MFR BLD: 6 % (ref 4.5–5.7)
Lab: ABNORMAL

## 2024-09-10 PROCEDURE — 83036 HEMOGLOBIN GLYCOSYLATED A1C: CPT | Performed by: FAMILY MEDICINE

## 2024-09-10 PROCEDURE — 3078F DIAST BP <80 MM HG: CPT | Performed by: FAMILY MEDICINE

## 2024-09-10 PROCEDURE — 3074F SYST BP LT 130 MM HG: CPT | Performed by: FAMILY MEDICINE

## 2024-09-10 PROCEDURE — 1126F AMNT PAIN NOTED NONE PRSNT: CPT | Performed by: FAMILY MEDICINE

## 2024-09-10 PROCEDURE — 99214 OFFICE O/P EST MOD 30 MIN: CPT | Performed by: FAMILY MEDICINE

## 2024-09-10 PROCEDURE — 3044F HG A1C LEVEL LT 7.0%: CPT | Performed by: FAMILY MEDICINE

## 2024-09-10 RX ORDER — TRAMADOL HYDROCHLORIDE 50 MG/1
50 TABLET ORAL EVERY 8 HOURS PRN
Qty: 12 TABLET | Refills: 0 | Status: SHIPPED | OUTPATIENT
Start: 2024-09-10

## 2024-09-12 NOTE — PROGRESS NOTES
"Chief Complaint  Arthritis, Wrist Pain (Left wrist pain. ), and Earache (Left ear pain, x2 weeks full feeling )    Subjective          Deisy Lama presents to [facility]  Arthritis    Wrist Pain     Earache         Patient presents complaining of arthritis wrist pain worse on the left as well as an earache on the left 2 weeks fulling full no fever drainage hearing loss or other    She has several other chronic conditions working on weight loss with Mounjaro as well as diabetes takes Lexapro for anxiety depression water pill for swelling and edema      Objective   Vital Signs:   /69 (BP Location: Right arm, Patient Position: Sitting, Cuff Size: Adult)   Pulse 80   Temp 98 °F (36.7 °C)   Resp 16   Ht 165.1 cm (65\")   Wt 71.8 kg (158 lb 6.4 oz)   SpO2 97%   BMI 26.36 kg/m²            Physical Exam  Vitals reviewed.   Constitutional:       Appearance: Normal appearance. She is well-developed.   HENT:      Head: Normocephalic and atraumatic.      Right Ear: External ear normal.      Left Ear: External ear normal.      Nose: Nose normal.   Eyes:      Conjunctiva/sclera: Conjunctivae normal.      Pupils: Pupils are equal, round, and reactive to light.   Cardiovascular:      Rate and Rhythm: Normal rate.   Pulmonary:      Effort: Pulmonary effort is normal.      Breath sounds: Normal breath sounds.   Abdominal:      General: There is no distension.   Musculoskeletal:         General: Tenderness present.      Right lower leg: Edema present.      Left lower leg: Edema present.   Skin:     General: Skin is warm and dry.   Neurological:      Mental Status: She is alert and oriented to person, place, and time. Mental status is at baseline.   Psychiatric:         Mood and Affect: Mood and affect normal.         Behavior: Behavior normal.         Thought Content: Thought content normal.         Judgment: Judgment normal.          Result Review :   The following data was reviewed " by: Sanchez Valenzuela DO on 09/10/2024:  Common labs          5/30/2024    11:03 6/6/2024    10:38 9/10/2024    09:42   Common Labs   Glucose 104  103     BUN 62  44     Creatinine 2.47  1.69     Sodium 140  139     Potassium 5.0  4.2     Chloride 100  104     Calcium 9.9  9.7     Albumin 4.1  4.1     Total Bilirubin 0.3      Alkaline Phosphatase 103      AST (SGOT) 20      ALT (SGPT) 25      WBC 11.72      Hemoglobin 12.8      Hematocrit 39.7      Platelets 488      Total Cholesterol 119      Triglycerides 99      HDL Cholesterol 30      LDL Cholesterol  70      Hemoglobin A1C 6.40   6.0                    Assessment and Plan    Diagnoses and all orders for this visit:    1. DM type 2 with diabetic dyslipidemia (Primary)  -     POC Glycosylated Hemoglobin (Hb A1C)    2. Essential hypertension    3. CKD (chronic kidney disease) stage 4, GFR 15-29 ml/min    4. Mixed hyperlipidemia    5. Left elbow pain  -     traMADol (ULTRAM) 50 MG tablet; Take 1 tablet by mouth Every 8 (Eight) Hours As Needed for Moderate Pain.  Dispense: 12 tablet; Refill: 0      A1c today for diabetes continue Mounjaro as prescribed and helping to manage weight and obesity much improved BMI under 27 now    Tramadol for elbow pain consider seeing specialist for further evaluation if indicated, has kidney disease recommend avoiding NSAIDs given stage IV    Blood pressure at goal less than 140/90    Continue medicine for leg swelling and edema diuretics as needed monitor fluid intake      Follow Up   Return in about 2 months (around 11/10/2024), or if symptoms worsen or fail to improve, for Next scheduled follow up, Recheck.  Patient was given instructions and counseling regarding her condition or for health maintenance advice. Please see specific information pulled into the AVS if appropriate.     Transcribed from ambient dictation for Sanchez Valenzuela DO by Sanchez Valenzuela DO.  09/12/24   10:27 EDT

## 2024-09-18 ENCOUNTER — TELEPHONE (OUTPATIENT)
Dept: FAMILY MEDICINE CLINIC | Facility: CLINIC | Age: 74
End: 2024-09-18
Payer: MEDICARE

## 2024-09-18 DIAGNOSIS — R19.7 DIARRHEA, UNSPECIFIED TYPE: Primary | ICD-10-CM

## 2024-09-18 DIAGNOSIS — M25.522 LEFT ELBOW PAIN: ICD-10-CM

## 2024-09-18 RX ORDER — LOPERAMIDE HYDROCHLORIDE 2 MG/1
2 TABLET ORAL 4 TIMES DAILY PRN
Qty: 30 TABLET | Refills: 2 | Status: SHIPPED | OUTPATIENT
Start: 2024-09-18

## 2024-09-18 RX ORDER — TRAMADOL HYDROCHLORIDE 50 MG/1
50 TABLET ORAL EVERY 12 HOURS PRN
Qty: 30 TABLET | Refills: 0 | Status: SHIPPED | OUTPATIENT
Start: 2024-09-18

## 2024-09-19 ENCOUNTER — LAB (OUTPATIENT)
Dept: LAB | Facility: HOSPITAL | Age: 74
End: 2024-09-19
Payer: MEDICARE

## 2024-09-19 ENCOUNTER — TRANSCRIBE ORDERS (OUTPATIENT)
Dept: LAB | Facility: HOSPITAL | Age: 74
End: 2024-09-19
Payer: MEDICARE

## 2024-09-19 DIAGNOSIS — N18.32 CHRONIC KIDNEY DISEASE (CKD) STAGE G3B/A1, MODERATELY DECREASED GLOMERULAR FILTRATION RATE (GFR) BETWEEN 30-44 ML/MIN/1.73 SQUARE METER AND ALBUMINURIA CREATININE RATIO LESS THAN 30 MG/G (CMS/H*: ICD-10-CM

## 2024-09-19 DIAGNOSIS — E11.49 DIABETIC NEUROPATHY WITH NEUROLOGIC COMPLICATION: ICD-10-CM

## 2024-09-19 DIAGNOSIS — E11.8 DIABETIC COMPLICATION: ICD-10-CM

## 2024-09-19 DIAGNOSIS — E11.40 DIABETIC NEUROPATHY WITH NEUROLOGIC COMPLICATION: ICD-10-CM

## 2024-09-19 DIAGNOSIS — N18.32 CHRONIC KIDNEY DISEASE (CKD) STAGE G3B/A1, MODERATELY DECREASED GLOMERULAR FILTRATION RATE (GFR) BETWEEN 30-44 ML/MIN/1.73 SQUARE METER AND ALBUMINURIA CREATININE RATIO LESS THAN 30 MG/G (CMS/H*: Primary | ICD-10-CM

## 2024-09-19 DIAGNOSIS — I51.9 MYXEDEMA HEART DISEASE: ICD-10-CM

## 2024-09-19 DIAGNOSIS — E78.2 MIXED HYPERLIPIDEMIA: ICD-10-CM

## 2024-09-19 DIAGNOSIS — I50.22 CHRONIC SYSTOLIC HEART FAILURE: ICD-10-CM

## 2024-09-19 DIAGNOSIS — I10 ESSENTIAL HYPERTENSION, MALIGNANT: ICD-10-CM

## 2024-09-19 DIAGNOSIS — E03.9 MYXEDEMA HEART DISEASE: ICD-10-CM

## 2024-09-19 LAB
25(OH)D3 SERPL-MCNC: 76 NG/ML (ref 30–100)
ALBUMIN SERPL-MCNC: 3.7 G/DL (ref 3.5–5.2)
ANION GAP SERPL CALCULATED.3IONS-SCNC: 10.4 MMOL/L (ref 5–15)
BACTERIA UR QL AUTO: ABNORMAL /HPF
BASOPHILS # BLD AUTO: 0.09 10*3/MM3 (ref 0–0.2)
BASOPHILS NFR BLD AUTO: 0.9 % (ref 0–1.5)
BILIRUB UR QL STRIP: NEGATIVE
BUN SERPL-MCNC: 41 MG/DL (ref 8–23)
BUN/CREAT SERPL: 25.9 (ref 7–25)
CALCIUM SPEC-SCNC: 9.9 MG/DL (ref 8.6–10.5)
CHLORIDE SERPL-SCNC: 108 MMOL/L (ref 98–107)
CLARITY UR: CLEAR
CO2 SERPL-SCNC: 23.6 MMOL/L (ref 22–29)
COLOR UR: YELLOW
CREAT SERPL-MCNC: 1.58 MG/DL (ref 0.57–1)
CREAT UR-MCNC: 106.6 MG/DL
DEPRECATED RDW RBC AUTO: 42.7 FL (ref 37–54)
EGFRCR SERPLBLD CKD-EPI 2021: 33.8 ML/MIN/1.73
EOSINOPHIL # BLD AUTO: 0.38 10*3/MM3 (ref 0–0.4)
EOSINOPHIL NFR BLD AUTO: 3.6 % (ref 0.3–6.2)
ERYTHROCYTE [DISTWIDTH] IN BLOOD BY AUTOMATED COUNT: 12.9 % (ref 12.3–15.4)
GLUCOSE SERPL-MCNC: 112 MG/DL (ref 65–99)
GLUCOSE UR STRIP-MCNC: NEGATIVE MG/DL
HCT VFR BLD AUTO: 38.3 % (ref 34–46.6)
HGB BLD-MCNC: 12.3 G/DL (ref 12–15.9)
HGB UR QL STRIP.AUTO: NEGATIVE
HYALINE CASTS UR QL AUTO: ABNORMAL /LPF
IMM GRANULOCYTES # BLD AUTO: 0.01 10*3/MM3 (ref 0–0.05)
IMM GRANULOCYTES NFR BLD AUTO: 0.1 % (ref 0–0.5)
KETONES UR QL STRIP: NEGATIVE
LEUKOCYTE ESTERASE UR QL STRIP.AUTO: ABNORMAL
LYMPHOCYTES # BLD AUTO: 2.7 10*3/MM3 (ref 0.7–3.1)
LYMPHOCYTES NFR BLD AUTO: 25.7 % (ref 19.6–45.3)
MCH RBC QN AUTO: 29.4 PG (ref 26.6–33)
MCHC RBC AUTO-ENTMCNC: 32.1 G/DL (ref 31.5–35.7)
MCV RBC AUTO: 91.4 FL (ref 79–97)
MONOCYTES # BLD AUTO: 1.21 10*3/MM3 (ref 0.1–0.9)
MONOCYTES NFR BLD AUTO: 11.5 % (ref 5–12)
NEUTROPHILS NFR BLD AUTO: 58.2 % (ref 42.7–76)
NEUTROPHILS NFR BLD AUTO: 6.13 10*3/MM3 (ref 1.7–7)
NITRITE UR QL STRIP: NEGATIVE
NRBC BLD AUTO-RTO: 0 /100 WBC (ref 0–0.2)
PH UR STRIP.AUTO: 6 [PH] (ref 5–8)
PHOSPHATE SERPL-MCNC: 4 MG/DL (ref 2.5–4.5)
PLATELET # BLD AUTO: 519 10*3/MM3 (ref 140–450)
PMV BLD AUTO: 10 FL (ref 6–12)
POTASSIUM SERPL-SCNC: 4.7 MMOL/L (ref 3.5–5.2)
PROT ?TM UR-MCNC: 9 MG/DL
PROT UR QL STRIP: NEGATIVE
PROT/CREAT UR: 0.08 MG/G{CREAT}
PTH-INTACT SERPL-MCNC: 19.4 PG/ML (ref 15–65)
RBC # BLD AUTO: 4.19 10*6/MM3 (ref 3.77–5.28)
RBC # UR STRIP: ABNORMAL /HPF
REF LAB TEST METHOD: ABNORMAL
SODIUM SERPL-SCNC: 142 MMOL/L (ref 136–145)
SP GR UR STRIP: 1.02 (ref 1–1.03)
SQUAMOUS #/AREA URNS HPF: ABNORMAL /HPF
UROBILINOGEN UR QL STRIP: ABNORMAL
WBC # UR STRIP: ABNORMAL /HPF
WBC NRBC COR # BLD AUTO: 10.52 10*3/MM3 (ref 3.4–10.8)

## 2024-09-19 PROCEDURE — 80069 RENAL FUNCTION PANEL: CPT

## 2024-09-19 PROCEDURE — 83970 ASSAY OF PARATHORMONE: CPT

## 2024-09-19 PROCEDURE — 81001 URINALYSIS AUTO W/SCOPE: CPT

## 2024-09-19 PROCEDURE — 84156 ASSAY OF PROTEIN URINE: CPT

## 2024-09-19 PROCEDURE — 85025 COMPLETE CBC W/AUTO DIFF WBC: CPT

## 2024-09-19 PROCEDURE — 82306 VITAMIN D 25 HYDROXY: CPT

## 2024-09-19 PROCEDURE — 82570 ASSAY OF URINE CREATININE: CPT

## 2024-09-19 PROCEDURE — 36415 COLL VENOUS BLD VENIPUNCTURE: CPT

## 2024-09-25 DIAGNOSIS — E66.3 OVERWEIGHT (BMI 25.0-29.9): Chronic | ICD-10-CM

## 2024-09-25 RX ORDER — TIRZEPATIDE 7.5 MG/.5ML
INJECTION, SOLUTION SUBCUTANEOUS
Qty: 2 ML | Refills: 3 | Status: SHIPPED | OUTPATIENT
Start: 2024-09-25

## 2024-10-09 NOTE — TELEPHONE ENCOUNTER
Patient would like to try going back to lower dose of 5mg.  She did not have any of these symptoms then.  She is calling to see if [facility] in [place] has it, if so she wants us to send it there.  She will call back to confirm pharmacy location.

## 2024-10-09 NOTE — TELEPHONE ENCOUNTER
Caller: Marywiliam Deisy GÓMEZ    Relationship: Self    Best call back number: [phone]    Requested Prescriptions:   Requested Prescriptions     Pending Prescriptions Disp Refills    Tirzepatide (MOUNJARO) 5 MG/0.5ML solution pen-injector pen       Sig: Inject 0.5 mL under the skin into the appropriate area as directed 1 (One) Time Per Week.        Pharmacy where request should be sent: [facility] - [facility] [address] [facility] - [phone]  - [phone]      Last office visit with prescribing clinician: 9/10/2024   Last telemedicine visit with prescribing clinician: Visit date not found   Next office visit with prescribing clinician: 11/4/2024     Additional details provided by patient: PATIENT CALLED BACK AND THIS  IS THE PHARMACY WHERE THE MOUNJARO NEEDS TO BE SENT SHE HAS ONE DOSAGE OF THE 7.5 REMAINING WHICH SHE IS NOT GOING TO USE     Does the patient have less than a 3 day supply:  [x] Yes  [] No    Would you like a call back once the refill request has been completed: [] Yes [x] No    If the office needs to give you a call back, can they leave a voicemail: [] Yes [x] No    González Cotto Rep   10/09/24 16:25 EDT

## 2024-10-09 NOTE — TELEPHONE ENCOUNTER
"Patient called yesterday and left message stating mounjaro 7.5mg is \"causing morning sickness and diarrhea.\"  She also stated \"my side thumps when I eat like being kicked by a baby when pregnant\" \" no energy at all\"  asking if you can change this to something else.  Please advise.   "

## 2024-10-10 NOTE — TELEPHONE ENCOUNTER
Rx was sent to [facility] yesterday and pharmacy confirmed receipt.

## 2024-10-24 ENCOUNTER — OFFICE VISIT (OUTPATIENT)
Dept: FAMILY MEDICINE CLINIC | Facility: CLINIC | Age: 74
End: 2024-10-24
Payer: MEDICARE

## 2024-10-24 VITALS
TEMPERATURE: 98.3 F | WEIGHT: 162.4 LBS | RESPIRATION RATE: 16 BRPM | HEART RATE: 65 BPM | OXYGEN SATURATION: 97 % | HEIGHT: 65 IN | SYSTOLIC BLOOD PRESSURE: 131 MMHG | DIASTOLIC BLOOD PRESSURE: 72 MMHG | BODY MASS INDEX: 27.06 KG/M2

## 2024-10-24 DIAGNOSIS — E66.3 OVERWEIGHT (BMI 25.0-29.9): Primary | Chronic | ICD-10-CM

## 2024-10-24 DIAGNOSIS — E78.2 MIXED HYPERLIPIDEMIA: Chronic | ICD-10-CM

## 2024-10-24 DIAGNOSIS — E03.9 HYPOTHYROIDISM, UNSPECIFIED TYPE: ICD-10-CM

## 2024-10-24 DIAGNOSIS — E11.69 DM TYPE 2 WITH DIABETIC DYSLIPIDEMIA: Chronic | ICD-10-CM

## 2024-10-24 DIAGNOSIS — E78.5 DM TYPE 2 WITH DIABETIC DYSLIPIDEMIA: Chronic | ICD-10-CM

## 2024-10-24 DIAGNOSIS — N18.4 CKD (CHRONIC KIDNEY DISEASE) STAGE 4, GFR 15-29 ML/MIN: Chronic | ICD-10-CM

## 2024-10-24 DIAGNOSIS — I10 ESSENTIAL HYPERTENSION: Chronic | ICD-10-CM

## 2024-10-24 RX ORDER — ROPINIROLE 4 MG/1
4 TABLET, FILM COATED ORAL NIGHTLY
Qty: 90 TABLET | Refills: 3 | Status: SHIPPED | OUTPATIENT
Start: 2024-10-24 | End: 2024-11-04 | Stop reason: SDUPTHER

## 2024-10-24 RX ORDER — PREDNISONE 10 MG/1
10 TABLET ORAL 3 TIMES DAILY
COMMUNITY
Start: 2024-09-24

## 2024-10-24 RX ORDER — GLIMEPIRIDE 2 MG/1
TABLET ORAL
COMMUNITY
Start: 2024-09-12 | End: 2024-10-24

## 2024-10-24 NOTE — PROGRESS NOTES
"Chief Complaint  Headache, Dizziness, Neck Pain, Fatigue (X2 weeks), and Edema (Bilateral feet swelling)    Subjective          Deisy Lama presents to [facility]  Headache  Dizziness  Associated symptoms include fatigue, headaches and neck pain.   Neck Pain   Associated symptoms include headaches.   Fatigue  Associated symptoms include fatigue, headaches and neck pain.     Patient presents with many complaints, chronic but worse or exacerbated     Feet swelling, having headaches daily unsure if from stress and demands at home helping  or other    Reviewed medicines that could contribute to symptoms and issues, try to simplify       Objective   Vital Signs:   /72 (BP Location: Left arm, Patient Position: Sitting, Cuff Size: Adult)   Pulse 65   Temp 98.3 °F (36.8 °C)   Resp 16   Ht 165.1 cm (65\")   Wt 73.7 kg (162 lb 6.4 oz)   SpO2 97%   BMI 27.02 kg/m²            Physical Exam  Vitals reviewed.   Constitutional:       Appearance: Normal appearance. She is well-developed.   HENT:      Head: Normocephalic and atraumatic.      Right Ear: External ear normal.      Left Ear: External ear normal.      Nose: Nose normal.   Eyes:      Conjunctiva/sclera: Conjunctivae normal.      Pupils: Pupils are equal, round, and reactive to light.   Cardiovascular:      Rate and Rhythm: Normal rate.   Pulmonary:      Effort: Pulmonary effort is normal.      Breath sounds: Normal breath sounds.   Abdominal:      General: There is no distension.   Skin:     General: Skin is warm and dry.   Neurological:      Mental Status: She is alert and oriented to person, place, and time. Mental status is at baseline.      Motor: Weakness present.   Psychiatric:         Mood and Affect: Mood and affect normal.         Behavior: Behavior normal.         Thought Content: Thought content normal.         Judgment: Judgment normal.          Result Review :   The following data was reviewed by: " Sanchez Valenzuela DO on 10/24/2024:  Common labs          6/6/2024    10:38 9/10/2024    09:42 9/19/2024    11:17   Common Labs   Glucose 103   112    BUN 44   41    Creatinine 1.69   1.58    Sodium 139   142    Potassium 4.2   4.7    Chloride 104   108    Calcium 9.7   9.9    Albumin 4.1   3.7    WBC   10.52    Hemoglobin   12.3    Hematocrit   38.3    Platelets   519    Hemoglobin A1C  6.0                     Assessment and Plan    Diagnoses and all orders for this visit:    1. Overweight (BMI 25.0-29.9) (Primary)    2. Essential hypertension    3. CKD (chronic kidney disease) stage 4, GFR 15-29 ml/min  -     Renal Function Panel; Future  -     CBC (No Diff); Future  -     TSH+Free T4; Future  -     Vitamin B12; Future  -     Folate; Future    4. DM type 2 with diabetic dyslipidemia    5. Mixed hyperlipidemia    6. Hypothyroidism, unspecified type  -     TSH+Free T4; Future    Other orders  -     Fluzone High-Dose 65+yrs (0333-3248)  -     rOPINIRole (REQUIP) 4 MG tablet; Take 1 tablet by mouth Every Night.  Dispense: 90 tablet; Refill: 3      Hold glimepiride mounjaro  Elevated feet legs  Consider wraps lymphedema treatment  Monitor fluid intake blood pressure glucose at home  Help with care for , respite  Bring medicines to follow up next week  Review other medicines for pain can't take NSAIDs with ckd, intolerant tramadol    Headaches now daily, stress related sugar blood pressure or other, had before years ago returned monitor above to see if related and longer off some meds may resovle or improve if related      Follow Up   Return in about 4 weeks (around 11/21/2024), or if symptoms worsen or fail to improve, for Next scheduled follow up, Recheck, Labs before.  Patient was given instructions and counseling regarding her condition or for health maintenance advice. Please see specific information pulled into the AVS if appropriate.     Transcribed from ambient dictation for Sanchez Valenzuela DO by Sanchez Valenzuela,  .  10/24/24   13:54 EDT

## 2024-11-04 ENCOUNTER — LAB (OUTPATIENT)
Dept: LAB | Facility: HOSPITAL | Age: 74
End: 2024-11-04
Payer: MEDICARE

## 2024-11-04 ENCOUNTER — OFFICE VISIT (OUTPATIENT)
Dept: FAMILY MEDICINE CLINIC | Facility: CLINIC | Age: 74
End: 2024-11-04
Payer: MEDICARE

## 2024-11-04 ENCOUNTER — HOSPITAL ENCOUNTER (OUTPATIENT)
Dept: GENERAL RADIOLOGY | Facility: HOSPITAL | Age: 74
Discharge: HOME | End: 2024-11-04
Payer: MEDICARE

## 2024-11-04 VITALS
HEART RATE: 68 BPM | WEIGHT: 157.6 LBS | TEMPERATURE: 97.5 F | HEIGHT: 65 IN | SYSTOLIC BLOOD PRESSURE: 128 MMHG | DIASTOLIC BLOOD PRESSURE: 73 MMHG | RESPIRATION RATE: 16 BRPM | BODY MASS INDEX: 26.26 KG/M2

## 2024-11-04 DIAGNOSIS — M65.4 TENOSYNOVITIS, DE QUERVAIN: Chronic | ICD-10-CM

## 2024-11-04 DIAGNOSIS — E03.9 HYPOTHYROIDISM, UNSPECIFIED TYPE: ICD-10-CM

## 2024-11-04 DIAGNOSIS — N18.4 CKD (CHRONIC KIDNEY DISEASE) STAGE 4, GFR 15-29 ML/MIN: Chronic | ICD-10-CM

## 2024-11-04 DIAGNOSIS — M25.472 BILATERAL SWELLING OF FEET AND ANKLES: Chronic | ICD-10-CM

## 2024-11-04 DIAGNOSIS — M25.471 BILATERAL SWELLING OF FEET AND ANKLES: Chronic | ICD-10-CM

## 2024-11-04 DIAGNOSIS — E78.5 DM TYPE 2 WITH DIABETIC DYSLIPIDEMIA: Chronic | ICD-10-CM

## 2024-11-04 DIAGNOSIS — M25.475 BILATERAL SWELLING OF FEET AND ANKLES: Primary | Chronic | ICD-10-CM

## 2024-11-04 DIAGNOSIS — M25.474 BILATERAL SWELLING OF FEET AND ANKLES: Chronic | ICD-10-CM

## 2024-11-04 DIAGNOSIS — N81.10 BLADDER PROLAPSE, FEMALE, ACQUIRED: Chronic | ICD-10-CM

## 2024-11-04 DIAGNOSIS — M25.474 BILATERAL SWELLING OF FEET AND ANKLES: Primary | Chronic | ICD-10-CM

## 2024-11-04 DIAGNOSIS — M25.472 BILATERAL SWELLING OF FEET AND ANKLES: Primary | Chronic | ICD-10-CM

## 2024-11-04 DIAGNOSIS — M25.50 CHRONIC JOINT PAIN: Chronic | ICD-10-CM

## 2024-11-04 DIAGNOSIS — G89.29 CHRONIC JOINT PAIN: Chronic | ICD-10-CM

## 2024-11-04 DIAGNOSIS — M25.471 BILATERAL SWELLING OF FEET AND ANKLES: Primary | Chronic | ICD-10-CM

## 2024-11-04 DIAGNOSIS — M25.475 BILATERAL SWELLING OF FEET AND ANKLES: Chronic | ICD-10-CM

## 2024-11-04 DIAGNOSIS — E66.3 OVERWEIGHT (BMI 25.0-29.9): Chronic | ICD-10-CM

## 2024-11-04 DIAGNOSIS — I10 ESSENTIAL HYPERTENSION: Chronic | ICD-10-CM

## 2024-11-04 DIAGNOSIS — G25.81 RLS (RESTLESS LEGS SYNDROME): Chronic | ICD-10-CM

## 2024-11-04 DIAGNOSIS — E11.69 DM TYPE 2 WITH DIABETIC DYSLIPIDEMIA: Chronic | ICD-10-CM

## 2024-11-04 PROBLEM — G99.0 PERIPHERAL AUTONOMIC NEUROPATHY IN DISORDERS CLASSIFIED ELSEWHERE: Status: ACTIVE | Noted: 2024-11-04

## 2024-11-04 PROBLEM — M47.816 ARTHROPATHY OF LUMBAR FACET JOINT: Status: ACTIVE | Noted: 2024-11-04

## 2024-11-04 LAB
ALBUMIN SERPL-MCNC: 3.6 G/DL (ref 3.5–5.2)
ANION GAP SERPL CALCULATED.3IONS-SCNC: 8.9 MMOL/L (ref 5–15)
BUN SERPL-MCNC: 46 MG/DL (ref 8–23)
BUN/CREAT SERPL: 18.9 (ref 7–25)
CALCIUM SPEC-SCNC: 10.2 MG/DL (ref 8.6–10.5)
CHLORIDE SERPL-SCNC: 103 MMOL/L (ref 98–107)
CO2 SERPL-SCNC: 25.1 MMOL/L (ref 22–29)
CREAT SERPL-MCNC: 2.43 MG/DL (ref 0.57–1)
DEPRECATED RDW RBC AUTO: 38.9 FL (ref 37–54)
EGFRCR SERPLBLD CKD-EPI 2021: 20.2 ML/MIN/1.73
ERYTHROCYTE [DISTWIDTH] IN BLOOD BY AUTOMATED COUNT: 12.1 % (ref 12.3–15.4)
FOLATE SERPL-MCNC: 13.7 NG/ML (ref 4.78–24.2)
GLUCOSE SERPL-MCNC: 99 MG/DL (ref 65–99)
HCT VFR BLD AUTO: 38.5 % (ref 34–46.6)
HGB BLD-MCNC: 12.6 G/DL (ref 12–15.9)
MCH RBC QN AUTO: 29.3 PG (ref 26.6–33)
MCHC RBC AUTO-ENTMCNC: 32.7 G/DL (ref 31.5–35.7)
MCV RBC AUTO: 89.5 FL (ref 79–97)
PHOSPHATE SERPL-MCNC: 4.1 MG/DL (ref 2.5–4.5)
PLATELET # BLD AUTO: 510 10*3/MM3 (ref 140–450)
PMV BLD AUTO: 10.3 FL (ref 6–12)
POTASSIUM SERPL-SCNC: 4.9 MMOL/L (ref 3.5–5.2)
RBC # BLD AUTO: 4.3 10*6/MM3 (ref 3.77–5.28)
SODIUM SERPL-SCNC: 137 MMOL/L (ref 136–145)
T4 FREE SERPL-MCNC: 1.45 NG/DL (ref 0.92–1.68)
TSH SERPL DL<=0.05 MIU/L-ACNC: 0.14 UIU/ML (ref 0.27–4.2)
URATE SERPL-MCNC: 8.6 MG/DL (ref 2.4–5.7)
VIT B12 BLD-MCNC: 353 PG/ML (ref 211–946)
WBC NRBC COR # BLD AUTO: 10.26 10*3/MM3 (ref 3.4–10.8)

## 2024-11-04 PROCEDURE — 82746 ASSAY OF FOLIC ACID SERUM: CPT

## 2024-11-04 PROCEDURE — 3074F SYST BP LT 130 MM HG: CPT | Performed by: FAMILY MEDICINE

## 2024-11-04 PROCEDURE — 85027 COMPLETE CBC AUTOMATED: CPT

## 2024-11-04 PROCEDURE — 84443 ASSAY THYROID STIM HORMONE: CPT

## 2024-11-04 PROCEDURE — 1159F MED LIST DOCD IN RCRD: CPT | Performed by: FAMILY MEDICINE

## 2024-11-04 PROCEDURE — 1160F RVW MEDS BY RX/DR IN RCRD: CPT | Performed by: FAMILY MEDICINE

## 2024-11-04 PROCEDURE — 84550 ASSAY OF BLOOD/URIC ACID: CPT

## 2024-11-04 PROCEDURE — 82607 VITAMIN B-12: CPT

## 2024-11-04 PROCEDURE — 84439 ASSAY OF FREE THYROXINE: CPT

## 2024-11-04 PROCEDURE — 80069 RENAL FUNCTION PANEL: CPT

## 2024-11-04 PROCEDURE — 73610 X-RAY EXAM OF ANKLE: CPT

## 2024-11-04 PROCEDURE — 99214 OFFICE O/P EST MOD 30 MIN: CPT | Performed by: FAMILY MEDICINE

## 2024-11-04 PROCEDURE — 36415 COLL VENOUS BLD VENIPUNCTURE: CPT

## 2024-11-04 PROCEDURE — 3078F DIAST BP <80 MM HG: CPT | Performed by: FAMILY MEDICINE

## 2024-11-04 PROCEDURE — 1126F AMNT PAIN NOTED NONE PRSNT: CPT | Performed by: FAMILY MEDICINE

## 2024-11-04 RX ORDER — ROPINIROLE 4 MG/1
4 TABLET, FILM COATED ORAL 4 TIMES DAILY
Qty: 360 TABLET | Refills: 3 | Status: SHIPPED | OUTPATIENT
Start: 2024-11-04

## 2024-11-04 RX ORDER — LATANOPROST 50 UG/ML
SOLUTION/ DROPS OPHTHALMIC
COMMUNITY
Start: 2024-10-29

## 2024-11-04 NOTE — PROGRESS NOTES
"Chief Complaint  Edema (Follow up on swelling. Bilateral ankle swelling. )    Subjective          Deisy Lama presents to [facility]  History of Present Illness    Presents to follow-up on edema continues to have swelling in her ankles bilaterally despite taking diuretics both Lasix and metolazone twice daily, as well as continuing to take spironolactone blood pressure overall stable controlled at less than 140/90 goal. Concern about kidney function long term with medicines she is taking to help swelling, reviewed kidney function last labs 9/2024 creatinine GFR stayed around same level the past 5 years, GFR hovering around 30.    Patient not taking Mounjaro or glimepiride for diabetes, last A1c was controlled at goal less than 7 manage diet alone recommended she would like to restart if able to maintain and help stave off weight gain, she is fearful she will gain weight back that she is lost to get her BMI between 25-30.      Objective   Vital Signs:   /73 (BP Location: Right arm, Patient Position: Sitting, Cuff Size: Adult)   Pulse 68   Temp 97.5 °F (36.4 °C)   Resp 16   Ht 165.1 cm (65\")   Wt 71.5 kg (157 lb 9.6 oz)   BMI 26.23 kg/m²            Physical Exam  Vitals reviewed.   Constitutional:       Appearance: Normal appearance. She is well-developed.   HENT:      Head: Normocephalic and atraumatic.      Right Ear: External ear normal.      Left Ear: External ear normal.      Nose: Nose normal.   Eyes:      Conjunctiva/sclera: Conjunctivae normal.      Pupils: Pupils are equal, round, and reactive to light.   Cardiovascular:      Rate and Rhythm: Normal rate.   Pulmonary:      Effort: Pulmonary effort is normal.      Breath sounds: Normal breath sounds.   Abdominal:      General: There is no distension.   Musculoskeletal:      Right lower leg: Edema present.      Left lower leg: Edema present.      Comments: Swelling pooling around ankles bilaterally not " significantly improved, painful    Skin:     General: Skin is warm and dry.   Neurological:      Mental Status: She is alert and oriented to person, place, and time. Mental status is at baseline.      Motor: Weakness present.   Psychiatric:         Mood and Affect: Mood and affect normal.         Behavior: Behavior normal.         Thought Content: Thought content normal.         Judgment: Judgment normal.          Result Review :   The following data was reviewed by: Sanchez Valenzuela DO on 11/04/2024:  Common labs          6/6/2024    10:38 9/10/2024    09:42 9/19/2024    11:17   Common Labs   Glucose 103   112    BUN 44   41    Creatinine 1.69   1.58    Sodium 139   142    Potassium 4.2   4.7    Chloride 104   108    Calcium 9.7   9.9    Albumin 4.1   3.7    WBC   10.52    Hemoglobin   12.3    Hematocrit   38.3    Platelets   519    Hemoglobin A1C  6.0                     Assessment and Plan    Diagnoses and all orders for this visit:    1. Bilateral swelling of feet and ankles (Primary)  -     XR Ankle 3+ View Left; Future  -     XR Ankle 3+ View Right; Future    2. RLS (restless legs syndrome)  -     rOPINIRole (REQUIP) 4 MG tablet; Take 1 tablet by mouth 4 (Four) Times a Day. 4 Times Daily  Dispense: 360 tablet; Refill: 3    3. Chronic joint pain  -     Uric acid; Future    4. CKD (chronic kidney disease) stage 4, GFR 15-29 ml/min    5. Bladder prolapse, female, acquired    6. Overweight (BMI 25.0-29.9)    7. Essential hypertension    8. DM type 2 with diabetic dyslipidemia    9. Tenosynovitis, de Quervain    10. Hypothyroidism, unspecified type      Recheck labs today including kidney function compared to baseline and previous from 9/2024 GFR has been having around 30, if stable continue diuretics as prescribed help with fluid and edema elevation additionally admitted    Given swelling and pooling around ankles bilaterally will order x-rays to evaluate if change in joint are concerned contributing versus vascular  issue or other  Does see vascular surgery to monitor infrarenal abdominal aneurysm last checked sometime this year    Continue holding medicine for diabetes control A1c less than 7  Can consider restarting Mounjaro if needs to help get weight and diet back on track to discontinue if symptoms return such as migraines or other    Consider following up with urology for bladder prolapse as this complicating diuresing patient for immediate repair or management           Follow Up   Return in about 5 weeks (around 12/9/2024) for Next scheduled follow up, Recheck, Labs before.  Patient was given instructions and counseling regarding her condition or for health maintenance advice. Please see specific information pulled into the AVS if appropriate.     Transcribed from ambient dictation for Sanchez Valenzuela DO by Sanchez Valenzuela DO.  11/04/24   09:51 EST

## 2024-11-06 DIAGNOSIS — N17.9 AKI (ACUTE KIDNEY INJURY): Primary | ICD-10-CM

## 2024-11-06 NOTE — PROGRESS NOTES
Kidney function has gone down again please take a break on the water pills lasix and metolazone and we can consider restarting a different water pill in a few weeks after they improve, recheck lab next week

## 2024-11-13 ENCOUNTER — LAB (OUTPATIENT)
Dept: LAB | Facility: HOSPITAL | Age: 74
End: 2024-11-13
Payer: MEDICARE

## 2024-11-13 DIAGNOSIS — N17.9 AKI (ACUTE KIDNEY INJURY): ICD-10-CM

## 2024-11-13 DIAGNOSIS — R60.0 BILATERAL LEG EDEMA: Primary | ICD-10-CM

## 2024-11-13 LAB
ALBUMIN SERPL-MCNC: 3.3 G/DL (ref 3.5–5.2)
ANION GAP SERPL CALCULATED.3IONS-SCNC: 8.8 MMOL/L (ref 5–15)
BUN SERPL-MCNC: 40 MG/DL (ref 8–23)
BUN/CREAT SERPL: 27.8 (ref 7–25)
CALCIUM SPEC-SCNC: 9.8 MG/DL (ref 8.6–10.5)
CHLORIDE SERPL-SCNC: 110 MMOL/L (ref 98–107)
CO2 SERPL-SCNC: 20.2 MMOL/L (ref 22–29)
CREAT SERPL-MCNC: 1.44 MG/DL (ref 0.57–1)
EGFRCR SERPLBLD CKD-EPI 2021: 37.8 ML/MIN/1.73
GLUCOSE SERPL-MCNC: 115 MG/DL (ref 65–99)
PHOSPHATE SERPL-MCNC: 3.7 MG/DL (ref 2.5–4.5)
POTASSIUM SERPL-SCNC: 4.4 MMOL/L (ref 3.5–5.2)
SODIUM SERPL-SCNC: 139 MMOL/L (ref 136–145)

## 2024-11-13 PROCEDURE — 80069 RENAL FUNCTION PANEL: CPT

## 2024-11-13 PROCEDURE — 36415 COLL VENOUS BLD VENIPUNCTURE: CPT

## 2024-11-15 ENCOUNTER — TELEPHONE (OUTPATIENT)
Dept: FAMILY MEDICINE CLINIC | Facility: CLINIC | Age: 74
End: 2024-11-15
Payer: MEDICARE

## 2024-11-15 NOTE — TELEPHONE ENCOUNTER
Caller: Deisy Lama    Relationship: Self    Best call back number: [phone]     Caller requesting test results: SELF    What test was performed: RENAL FUNCTION PANEL    When was the test performed: 11/13/24    Where was the test performed: [facility]

## 2024-11-18 ENCOUNTER — TELEPHONE (OUTPATIENT)
Dept: CARDIOLOGY | Facility: CLINIC | Age: 74
End: 2024-11-18
Payer: MEDICARE

## 2024-11-18 NOTE — TELEPHONE ENCOUNTER
Ms. Lama called stating that she has edema due to her diuretics being stopped for a week.  They were restarted on 11/15/2024 toño to her numbers improving.    The edema has not improved, I encouraged her to contact Dr. Sanchez Valenzuela and to contact her Kidney Doctor for a much sooner appointment.    We have her scheduled for 12/04/24 for follow up and device check.

## 2024-11-18 NOTE — TELEPHONE ENCOUNTER
Caller: Deisy Lama    Relationship: Self    Best call back number: [phone] (home)      What is the best time to reach you: ANYTIME     Who are you requesting to speak with (clinical staff, provider,  specific staff member): LARRY OR WHOEVER DEALS WITH HEART MONITORS     What was the call regarding: PT IS WONDERING IF HER MONITOR IS STILL WORKING, SHE STATES SHE HAD SOME CHEST PAINS LAST NIGHT AND HER FEET HAVE BEEN SWOLLEN FOR ABUOT 2 MONTHS NOW. SHE IS JUST WONDERING IF WE ARE GETTING ANY FEEDBACK FROM THE MONITOR, AS SHE NORMALLY GETS A CALL FROM US AFTER EPISODES.       Is it okay if the provider responds through MyChart: CALL BACK

## 2024-11-19 ENCOUNTER — TELEPHONE (OUTPATIENT)
Dept: FAMILY MEDICINE CLINIC | Facility: CLINIC | Age: 74
End: 2024-11-19

## 2024-11-19 NOTE — TELEPHONE ENCOUNTER
Caller: Deisy Lama    Relationship to patient: Self    Best call back number: [phone]    Patient is needing: PATIENT CALLED REQUESTING TO SPEAK WITH CLINICAL STAFF. PATIENT STATES DO LESTER HAD PLACED A REFERRAL FOR HER FOR PHYSICAL THERAPY. PATIENT STATES SHE SPOKE WITH THE OFFICE THIS REFERRAL WAS SENT TO AND WAS TOLD THE REFERRAL WAS NOT CORRECT AND NEEDED TO BE FIXED. CALLER STATES THIS REFERRAL WAS NEEDING TO BE FOR OCCUPATIONAL THERAPY AND NOT PT.

## 2024-11-20 ENCOUNTER — TELEPHONE (OUTPATIENT)
Dept: FAMILY MEDICINE CLINIC | Facility: CLINIC | Age: 74
End: 2024-11-20
Payer: MEDICARE

## 2024-11-20 ENCOUNTER — TELEPHONE (OUTPATIENT)
Dept: CARDIOLOGY | Facility: CLINIC | Age: 74
End: 2024-11-20

## 2024-11-20 ENCOUNTER — TELEPHONE (OUTPATIENT)
Dept: CARDIOLOGY | Facility: CLINIC | Age: 74
End: 2024-11-20
Payer: MEDICARE

## 2024-11-20 DIAGNOSIS — G89.29 CHRONIC PAIN OF RIGHT ANKLE: Primary | ICD-10-CM

## 2024-11-20 DIAGNOSIS — G89.29 CHRONIC PAIN OF RIGHT ANKLE: ICD-10-CM

## 2024-11-20 DIAGNOSIS — M25.571 CHRONIC PAIN OF RIGHT ANKLE: ICD-10-CM

## 2024-11-20 DIAGNOSIS — M25.571 CHRONIC PAIN OF RIGHT ANKLE: Primary | ICD-10-CM

## 2024-11-20 RX ORDER — HYDROCODONE BITARTRATE AND ACETAMINOPHEN 7.5; 325 MG/1; MG/1
1 TABLET ORAL EVERY 8 HOURS PRN
Qty: 9 TABLET | Refills: 0 | Status: SHIPPED | OUTPATIENT
Start: 2024-11-20

## 2024-11-20 RX ORDER — HYDROCODONE BITARTRATE AND ACETAMINOPHEN 7.5; 325 MG/1; MG/1
1 TABLET ORAL EVERY 8 HOURS PRN
Qty: 9 TABLET | Refills: 0 | Status: SHIPPED | OUTPATIENT
Start: 2024-11-20 | End: 2024-11-20 | Stop reason: SDUPTHER

## 2024-11-20 NOTE — TELEPHONE ENCOUNTER
Hub staff attempted to follow warm transfer process and was unsuccessful     Caller: Deisy Lama    Relationship to patient: Self    Best call back number: [phone]     Patient is needing: PATIENT RETURNED [place]'S CALL.

## 2024-11-20 NOTE — TELEPHONE ENCOUNTER
Caller: Deisy Lama    Relationship: Self    Best call back number:     Which medication are you concerned about: TRAMADOL    PATIENT STATED MEDICATION IS NOT WORKING WELL AT ALL AND PLEASE ANYTHING ELSE TO HER PHARMACY FOR RIGHT FOOT PAIN. PATIENT IS IS EXTREME PAIN AND NOT ABLE TO SLEEP WELL AT NIGHT.

## 2024-11-20 NOTE — TELEPHONE ENCOUNTER
Caller: Deisy Lama    Relationship: Self    Best call back number: [phone]     What was the call regarding: PATIENT STATES SHE NEEDS THE PRESCRIPTION TRANSFERRED TO [facility] BECAUSE IT IS CLOSER.     PATIENT STATES THE SWELLING IN HER LEFT LEG HAS LESSENED TO THE POINT IT IS ALMOST NORMAL SIZE, BUT HER RIGHT LEG IS STILL VERY SWOLLEN.     [facility] #[id number] [address] AT [facility] & [address]  [phone] [facility] [phone]  [phone]

## 2024-11-20 NOTE — TELEPHONE ENCOUNTER
Caller: Deisy Lama    Relationship to patient: Self    Best call back number: [phone]     Chief complaint: N/A    Type of visit: 6 MONTH FU AND DEVICE CHECK    Requested date: NOT THE 16TH      If rescheduling, when is the original appointment: 12.04.2024     Additional notes: NEXT AVAILABLE WAS NOT UNTIL END OF JANUARY

## 2024-11-27 ENCOUNTER — TELEPHONE (OUTPATIENT)
Dept: FAMILY MEDICINE CLINIC | Facility: CLINIC | Age: 74
End: 2024-11-27
Payer: MEDICARE

## 2024-11-27 DIAGNOSIS — G25.81 RLS (RESTLESS LEGS SYNDROME): Chronic | ICD-10-CM

## 2024-11-27 RX ORDER — ROPINIROLE 4 MG/1
4 TABLET, FILM COATED ORAL 4 TIMES DAILY
Qty: 360 TABLET | Refills: 3 | Status: SHIPPED | OUTPATIENT
Start: 2024-11-27

## 2024-12-03 RX ORDER — BLOOD-GLUCOSE METER
1 EACH MISCELLANEOUS DAILY
Qty: 1 KIT | Refills: 0 | Status: SHIPPED | OUTPATIENT
Start: 2024-12-03

## 2024-12-03 NOTE — TELEPHONE ENCOUNTER
Patient asking for a new glucose meter, pended one for you.  She currently has test strips and lancets for accu-check.

## 2024-12-03 NOTE — TELEPHONE ENCOUNTER
Caller: Deisy Lama    Relationship: Self    Best call back number: [phone]    What orders are you requesting (i.e. lab or imaging): BLOOD GLUCOSE MONITOR     Where will you receive your lab/imaging services: [facility] #[id number] - [place] [address] [address] [facility] AT [facility] & [address] - [phone] [facility] [phone]  [phone]     Additional notes: PATIENT STATED HER BLOOD GLUCOSE MONITOR IS NOT WORKING OR READING SUGAR LEVELS AND IS REQUESTING A NEW ONE. PATIENT STATED SHE HAS LANCETS AND STRIPS FOR ACU-CHECK METER.

## 2024-12-04 ENCOUNTER — HOSPITAL ENCOUNTER (OUTPATIENT)
Facility: HOSPITAL | Age: 74
Setting detail: THERAPIES SERIES
Discharge: HOME | End: 2024-12-04
Payer: MEDICARE

## 2024-12-04 DIAGNOSIS — N18.4 CKD (CHRONIC KIDNEY DISEASE) STAGE 4, GFR 15-29 ML/MIN: ICD-10-CM

## 2024-12-04 DIAGNOSIS — R60.0 BILATERAL LEG EDEMA: ICD-10-CM

## 2024-12-04 DIAGNOSIS — M25.475 BILATERAL SWELLING OF FEET AND ANKLES: ICD-10-CM

## 2024-12-04 DIAGNOSIS — I50.22 CHRONIC SYSTOLIC HEART FAILURE: ICD-10-CM

## 2024-12-04 DIAGNOSIS — I89.0 LYMPHEDEMA: Primary | ICD-10-CM

## 2024-12-04 DIAGNOSIS — R52 PAIN: ICD-10-CM

## 2024-12-04 DIAGNOSIS — M25.474 BILATERAL SWELLING OF FEET AND ANKLES: ICD-10-CM

## 2024-12-04 DIAGNOSIS — M25.471 BILATERAL SWELLING OF FEET AND ANKLES: ICD-10-CM

## 2024-12-04 DIAGNOSIS — M25.472 BILATERAL SWELLING OF FEET AND ANKLES: ICD-10-CM

## 2024-12-04 PROCEDURE — 97165 OT EVAL LOW COMPLEX 30 MIN: CPT

## 2024-12-04 NOTE — THERAPY EVALUATION
Outpatient Occupational Therapy Lymphedema Initial Evaluation  MEHRDAD Garrett     Patient Name: Deisy Lama  : 1950  MRN: [id number]  Today's Date: 2024      Visit Date: 2024    Patient Active Problem List   Diagnosis    Chronic systolic heart failure    Mixed hyperlipidemia    Essential hypertension    Presence of biventricular implantable cardioverter-defibrillator (ICD)    Lumbar degenerative disc disease    Lumbosacral spondylosis without myelopathy    Osteoarthritis of knee    Sebaceous cyst    Non-recurrent bilateral inguinal hernia without obstruction or gangrene    Chronic midline low back pain with left-sided sciatica    Hypothyroidism    Sciatica    Diabetic peripheral neuropathy    Degenerative scoliosis in adult patient    S/P spinal fusion    Lumbar foraminal stenosis    DM type 2 with diabetic dyslipidemia    Bilateral leg edema    Torticollis    Class 1 obesity due to excess calories with serious comorbidity and body mass index (BMI) of 30.0 to 30.9 in adult    Overweight (BMI 25.0-29.9)    CKD (chronic kidney disease) stage 4, GFR 15-29 ml/min    Bilateral swelling of feet and ankles    RLS (restless legs syndrome)    Chronic joint pain    Bladder prolapse, female, acquired    Tenosynovitis, de Quervain    Arthropathy of lumbar facet joint    Peripheral autonomic neuropathy in disorders classified elsewhere        Past Medical History:   Diagnosis Date    AAA (abdominal aortic aneurysm)     Pt reported/BEING MONITORED    Acid reflux     Arthritis     Blood disease     ABNORMAL PLATELET COUNT/CHRONIC ELEVATED WBC,NO LEUKEMIA    CHB (complete heart block)     PACEMAKER    Congestive heart failure (CHF)     MANAGED W/DIURETIC, NO RECENT PROBLEMS    Cystourethrocele 2014    Diabetes mellitus     AVERAGE AM BS 'S    Glaucoma     Hiatal hernia     History of transfusion     LAST  REPORTS NO TRANSFUSION REACTION POST SPINAL FUSION    Hyperlipidemia LDL goal <100 2021     Hypertension     Kidney stone     NO CURRENT ISSUES    Presence of biventricular implantable cardioverter-defibrillator (ICD) 08/23/2021    Sciatica     LEFT    Sleep apnea     Thyroid disorder     hypo    Wound of back     REPORTS HAS NOT HEALED SINCE BACK SURGERY AND IS FOLLOWED BY WOUND CARE        Past Surgical History:   Procedure Laterality Date    APPENDECTOMY      BLADDER SURGERY      REPAIR    CARDIAC DEFIBRILLATOR PLACEMENT      CATARACT EXTRACTION Bilateral     WITH LENS IMPLANT    CHOLECYSTECTOMY      COLONOSCOPY      GASTROPLASTY      FOR WEIGHT LOSS    HEEL SPUR SURGERY      HERNIA REPAIR      ABDOMINAL    HYSTERECTOMY      INGUINAL HERNIA REPAIR Bilateral 8/31/2022    Procedure: INGUINAL HERNIA REPAIR LAPAROSCOPIC WITH [employer] ROBOT, lysis of adhesions;  Surgeon: Lauri Celis MD;  Location: [facility] OR [facility];  Service: Robotics - DaVinci;  Laterality: Bilateral;    KIDNEY STONE SURGERY      LUMBAR DISCECTOMY Left 1/27/2023    Procedure: MINIMALLY INVASIVE LUMBAR LATERAL RECESS DECOMPRESSION AND FORAMINOTOMY, LEFT APPROACH, LUMBAR 2-LUMBAR 3;  Surgeon: Ralf Rodriguez MD;  Location: [facility] MAIN OR;  Service: Neurosurgery;  Laterality: Left;    PACEMAKER IMPLANTATION      PACEMAKER REPLACEMENT N/A 11/28/2023    Procedure: PPM generator change - dual;  Surgeon: Michael Bernal MD;  Location: [facility] CATH INVASIVE LOCATION;  Service: Cardiovascular;  Laterality: N/A;    SPLENECTOMY      TRAUMATIC INJURY R/T GASTROPLASTY SURGERY    TONSILLECTOMY           Visit Dx:     ICD-10-CM ICD-9-CM   1. Lymphedema  I89.0 457.1   2. Pain  R52 780.96   3. Chronic systolic heart failure  I50.22 428.22   4. Bilateral leg edema  R60.0 782.3   5. CKD (chronic kidney disease) stage 4, GFR 15-29 ml/min  N18.4 585.4   6. Bilateral swelling of feet and ankles  M25.471 719.07    M25.474     M25.475     M25.472         Patient History       Row Name 12/04/24 1300             History    Chief Complaint  Swelling;Tightness;Pain;Difficulty with daily activities  -SF      Brief Description of Current Complaint Patient is a 74 year old female presenting for evaluation of BLE lymphedema. Patient has a history of congestive heart failure and kidney failure.  -SF         Fall Risk Assessment    Any falls in the past year: Yes  -SF      Number of falls reported in the last 12 months 1  -SF      Factors that contributed to the fall: Other (comment)  -SF      Does patient have a fear of falling Yes (comment)  -SF         Services    Prior Rehab/Home Health Experiences Yes  -SF      Are you currently receiving Home Health services No  -SF      Do you plan to receive Home Health services in the near future No  -SF         Daily Activities    Primary Language English  -SF      Are you able to read Yes  -SF      Are you able to write Yes  -SF      How does patient learn best? Listening;Reading;Demonstration;Pictures/Video  -SF      Pt Participated in POC and Goals Yes  -SF         Safety    Are you being hurt, hit, or frightened by anyone at home or in your life? No  -SF      Are you being neglected by a caregiver No  -SF      Have you had any of the following issues with Depression  controlled with medication  -SF                User Key  (r) = Recorded By, (t) = Taken By, (c) = Cosigned By      Initials Name Provider Type    Jesika Garcia OT Occupational Therapist                     Lymphedema       Row Name 12/04/24 1400             Subjective Pain    Able to rate subjective pain? yes  -SF      Pre-Treatment Pain Level 2  -SF      Post-Treatment Pain Level 2  -SF      Subjective Pain Comment BLE  -SF         Subjective    Subjective Comments Patient reports she has been having swelling for over a year. Patient reports her LLE started swelling after back surgery. Patient reports increased in swelling within the last month. Patient reports she was taking a water pill and it was helping but now is in stage 3 kidney failure.  "Patient reports she has worn compression in the past. Patient reports her mobility is impacted by her swelling.  -SF         Lymphedema Assessment    Lymphedema Classification RLE:;LLE:;stage 2 (Spontaneously Irreversible)  -SF         LLIS - Physical Concerns    The amount of pain associated with my lymphedema is: 0  -SF      The amount of limb heaviness associated with my lymphedema is: 4  -SF      The amount of skin tightness associated with my lymphedema is: 4  -SF      The size of my swollen limb(s) seems: 4  -SF      Lymphedema affects the movement of my swollen limb(s): 4  -SF      The strength in my swollen limb(s) is: 4  -SF         LLIS - Psychosocial Concerns    Lymphedema affects my body image (i.e., \"how I think I look\"). 4  -SF      Lymphedema affects my socializing with others. 4  -SF      Lymphedema affects my intimate relations with spouse or partner (rate 0 if not applicable 0  -SF      Lymphedema \"gets me down\" (i.e., depression, frustration, or anger) 4  -SF      I must rely on others for help due to my lymphedema. 1  -SF      I know what to do to manage my lymphedema 4  -SF         LLIS - Functional Concerns    Lymphedema affects my ability to perform self-care activities (i.e. eating, dressing, hygiene) 4  -SF      Lymphedema affects my ability to perform routine home or work-related activities. 4  -SF      Lymphedema affects my performance of preferred leisure activities. 4  -SF      Lymphedema affects proper fit of clothing/shoes 3  -SF      Lymphedema affects my sleep 4  -SF         Posture/Observations    Alignment Options Forward head;Rounded shoulders  -SF         Lymphedema Edema Assessment    Ptting Edema Category By severity  -SF      Pitting Edema Severe  -SF      Stemmer Sign bilateral:;positive  -SF         Skin Changes/Observations    Location/Assessment Lower Extremity  -SF      Lower Extremity Conditions bilateral:;clean;intact;shiny;hairless  -SF      Lower Extremity " Color/Pigment bilateral:;hyperpigmented;purple  -SF      Lower Extremity Skin Details Varicose veins bilaterally.  -SF         Lymphedema Measurements    Measurement Type(s) Circumferential  -SF      Circumferential Areas Lower extremities  -SF         Compression/Skin Care    Compression/Skin Care Comments Patient was measured for the following compression garments: Medi reduction kit lower leg - size standard regular - Pac band size standard for RLE and LLE  -SF         Lymphedema Life Impact Scale Totals    A.  Total Q1 - Q17 (Do not include Q18) 56  -SF      B.  Total number of questions answered (Q1-Q17) 17  -SF      C. Divide A by B 3.29  -SF      D. Multiple C by 25 82.25  -SF                User Key  (r) = Recorded By, (t) = Taken By, (c) = Cosigned By      Initials Name Provider Type    Jesika Garcia OT Occupational Therapist                    Circumferential Measurements:    RLE  LLE   1 24.5 23   2 31 28.7   3 37.6 36   4 45.1 41.4   5 50.4 44.2   6 45.5 42           Therapy Education  Education Details: Therapist educating pt on complete decongestive therapy and the process of OT therapy services. Therapist explaining to pt that once pt has reached a plateau in lymph volume reduction therapist work with a separate company to obtain permanent garments. Therapist discussing with pt regarding therapist requesting benefit checks from said company for garment coverage.  Therapist discussing with patient option of using a reduction kit to reduce lymph volume due to patient's schedule and taking care of her . Therapist providing pt with new HEP. Therapist providing written and visual demonstration of technique with safety cues to prevent injury. Pt educated on signs and symptoms of infection. Therapist urging pt to seek doctor evaluation if signs and symptoms occur and to remove any compression. Therapist educating pt regarding pt must be on antibiotic for 72 hours if there is an infection prior to  continuing compression to prevent the spread of infection.  Given: HEP, Edema management, Symptoms/condition management  Program: New  How Provided: Verbal, Demonstration, Written  Provided to: Patient  Level of Understanding: Teach back education performed, Verbalized         OT Goals       Row Name 12/04/24 1456          Time Calculation    OT Goal Re-Cert Due Date 01/03/25  -               User Key  (r) = Recorded By, (t) = Taken By, (c) = Cosigned By      Initials Name Provider Type    Jesika Garcia OT Occupational Therapist                  1. Uncontrolled lymphedema of bilateral lower extremity/lower quadrant.  LTG1:  90 days:  Volumetric measurements of bilateral lower extremity/lower quadrant will be decreased by 10 % or until plateau in reduction is achieved to improve functional mobility.           STATUS:  New   STG1a:  30 days:  Volumetric measurements of bilateral lower extremity/lower quadrant will be decreased by 5 % bilaterally to improve functional mobility.    STATUS:  New  TREATMENT:  Manual Therapy, Therapeutic exercise, ADL/self-care therapy, Bioimpedence Fluid Analysis, Orthotic management and training, Therapeutic Activity, wound dressing as needed, Modalities: TENS, NMES, Ultrasound, Fluidotherapy, , and Patient and family/caregiver education.    2. Patient with decreased ADL/Self-Care routine for lymphedema management.   LTG 2:  90 days:  Patient/caregiver will independently verbalize/demonstrate ADL/Self-Care routine for lymphedema management.           STATUS:  New   STG 2a:  30 days:  Patient/caregiver will independently perform or verbally dictate compression wrapping technique of the lower extremity/lower quadrant.           STATUS:  New   STG2b:  30 days:  Patient will independently verbalize signs and symptoms of infection.                    STATUS:  New   STG2c:  30 days:  Patient will independently demonstrate/verbalize proper skin care routine to prevent infection and or wound  development.            STATUS:  New  STG2d:  30 days:  Patient will independently perform teach back of HEP routine.           STATUS: New  STG2e:  30 days:  Patient/caregiver will obtain properly fitting compression orthosis, will demonstrate don/doff skill of compression orthosis with modified independence, and independently verbalize wear schedule.          STATUS: New   STG2f: 30 days: Patient/caregiver will independently perform self-manual lymphatic drainage of the lower extremity/lower quadrant.          STATUS: New  TREATMENT:  Therapeutic Activity, Patient/Caregiver Education, ADL retraining, Manual Therapy, Orthotic Management and training, Modalities: TENS, NMES, Ultrasound, Fluidotherapy Wound dressing if necessary,  Therapeutic Exercise, Modalities     3. Self-Care Limitations     LTG 3: 90 days:  The patient will demonstrate improved quality of life by achieving a score of 60 on the Lymphedema Life Impact Scale.           STATUS:  New   STG 3a: 30 days:  The patient will demonstrate improved quality of life by achieving a score of 70 on the Lymphedema Life Impact Scale.           STATUS:  New  TREATMENT:  Manual therapy, therapeutic exercise, therapeutic activity, ADL retraining, Patient/caregiver education, Modalities: TENS, NMES, Ultrasound, Fluidotherapy Orthotic Management and Training, Wound dressing as needed.         OT Assessment/Plan       Row Name 12/04/24 1451          OT Assessment    Functional Limitations Decreased safety during functional activities;Limitations in functional capacity and performance;Performance in leisure activities;Impaired gait;Performance in self-care ADL;Limitation in home management;Impaired locomotion  -SF     Impairments Edema;Impaired lymphatic circulation;Impaired muscle endurance;Impaired venous circulation;Muscle strength;Pain;Locomotion  -SF     Assessment Comments Patient is a pleasant 74-year-old female presenting for evaluation of bilateral lower  extremity lymphedema.  Patient has a complex medical history and reports increase in swelling over the past year.  Patient's lymphedema is accompanied by pain, heaviness, skin tightness and impacts her performance in daily activities as well as her safety with mobility.  Patient will benefit from skilled occupational therapy to facilitate a reduction of bilateral lower extremity lymphedema until a plateau is achieved, patient has obtained permanent compression garments and is independent with complete decongestive therapy.  -SF     OT Diagnosis Lymphedema  -SF     OT Rehab Potential Good  -SF     Patient/caregiver participated in establishment of treatment plan and goals Yes  -SF     Patient would benefit from skilled therapy intervention Yes  -SF        OT Plan    OT Frequency 1x/week;2x/week;3x/week  -SF     Predicted Duration of Therapy Intervention (OT) 12 weeks  -SF     Planned CPT's? OT EVAL LOW COMPLEXITY: 56592;OT EVAL MOD COMPLEXITY: 51719;OT EVAL HIGH COMPLEXITY: 03452;OT RE-EVAL: 40266;OT THER PROC EA 15 MIN: [id number];OT THER ACT EA 15 MIN: [id number];OT MANUAL THERAPY EA 15 MIN: 26341  -SF     Planned Therapy Interventions (Optional Details) home exercise program;manual therapy techniques;strengthening;patient/family education  -SF     OT Plan Comments Initiate POC  -SF               User Key  (r) = Recorded By, (t) = Taken By, (c) = Cosigned By      Initials Name Provider Type    Jesika Garcia OT Occupational Therapist                          OT eval complexity:    Examination:    Performance Deficits include:   Health Management, Home Management, Leisure Participation, Sleep, Functional Mobility   # deficits affecting performance:   5   Decision Making:       Treatment Options Considered : Health Promotion, Prevention, Remediation/Restoration   # Treatment options considered : Several (3) - Multiple (4+)   Modification Made for: Complexity, Task   Level of Task Modification: Minimal to Moderate    Comorbidity Affect Performance: Yes   How is performance affected See medical hx   Evaluation Level Determined:   Moderate          Time Calculation:   Untimed Charges  OT Eval/Re-eval Minutes: 70  Total Minutes  Untimed Charges Total Minutes: 70   Total Minutes: 70     Therapy Charges for Today       Code Description Service Date Service Provider Modifiers Qty    57912041509 HC OT EVAL LOW COMPLEXITY 4 12/4/2024 Jesika Kenyon, JAKY GO 1                      Jesika Kenyon OT  12/4/2024

## 2024-12-05 ENCOUNTER — TELEPHONE (OUTPATIENT)
Dept: OCCUPATIONAL THERAPY | Facility: HOSPITAL | Age: 74
End: 2024-12-05
Payer: MEDICARE

## 2024-12-05 DIAGNOSIS — R52 PAIN: ICD-10-CM

## 2024-12-05 DIAGNOSIS — M25.474 BILATERAL SWELLING OF FEET AND ANKLES: ICD-10-CM

## 2024-12-05 DIAGNOSIS — R60.0 BILATERAL LEG EDEMA: ICD-10-CM

## 2024-12-05 DIAGNOSIS — M25.472 BILATERAL SWELLING OF FEET AND ANKLES: ICD-10-CM

## 2024-12-05 DIAGNOSIS — I89.0 LYMPHEDEMA: Primary | ICD-10-CM

## 2024-12-05 DIAGNOSIS — M25.471 BILATERAL SWELLING OF FEET AND ANKLES: ICD-10-CM

## 2024-12-05 DIAGNOSIS — N18.4 CKD (CHRONIC KIDNEY DISEASE) STAGE 4, GFR 15-29 ML/MIN: ICD-10-CM

## 2024-12-05 DIAGNOSIS — M25.475 BILATERAL SWELLING OF FEET AND ANKLES: ICD-10-CM

## 2024-12-05 DIAGNOSIS — I50.22 CHRONIC SYSTOLIC HEART FAILURE: ICD-10-CM

## 2024-12-05 NOTE — TELEPHONE ENCOUNTER
Please sign Rx in order for pt to receive compression garment to assist with lymph reduction in  BLE. Thank you

## 2024-12-12 ENCOUNTER — HOSPITAL ENCOUNTER (OUTPATIENT)
Dept: OCCUPATIONAL THERAPY | Facility: HOSPITAL | Age: 74
Setting detail: THERAPIES SERIES
Discharge: HOME | End: 2024-12-12
Payer: MEDICARE

## 2024-12-12 DIAGNOSIS — M25.475 BILATERAL SWELLING OF FEET AND ANKLES: ICD-10-CM

## 2024-12-12 DIAGNOSIS — M25.472 BILATERAL SWELLING OF FEET AND ANKLES: ICD-10-CM

## 2024-12-12 DIAGNOSIS — I89.0 LYMPHEDEMA: Primary | ICD-10-CM

## 2024-12-12 DIAGNOSIS — I50.22 CHRONIC SYSTOLIC HEART FAILURE: ICD-10-CM

## 2024-12-12 DIAGNOSIS — R60.0 BILATERAL LEG EDEMA: ICD-10-CM

## 2024-12-12 DIAGNOSIS — M25.474 BILATERAL SWELLING OF FEET AND ANKLES: ICD-10-CM

## 2024-12-12 DIAGNOSIS — N18.4 CKD (CHRONIC KIDNEY DISEASE) STAGE 4, GFR 15-29 ML/MIN: ICD-10-CM

## 2024-12-12 DIAGNOSIS — M25.471 BILATERAL SWELLING OF FEET AND ANKLES: ICD-10-CM

## 2024-12-12 DIAGNOSIS — R52 PAIN: ICD-10-CM

## 2024-12-12 PROCEDURE — 97535 SELF CARE MNGMENT TRAINING: CPT

## 2024-12-12 PROCEDURE — 97140 MANUAL THERAPY 1/> REGIONS: CPT

## 2024-12-12 NOTE — THERAPY TREATMENT NOTE
Outpatient Occupational Therapy Lymphedema Treatment Note  MEHRDAD Garrett     Patient Name: Deisy Lama  : 1950  MRN: [id number]  Today's Date: 2024      Visit Date: 2024    Patient Active Problem List   Diagnosis    Chronic systolic heart failure    Mixed hyperlipidemia    Essential hypertension    Presence of biventricular implantable cardioverter-defibrillator (ICD)    Lumbar degenerative disc disease    Lumbosacral spondylosis without myelopathy    Osteoarthritis of knee    Sebaceous cyst    Non-recurrent bilateral inguinal hernia without obstruction or gangrene    Chronic midline low back pain with left-sided sciatica    Hypothyroidism    Sciatica    Diabetic peripheral neuropathy    Degenerative scoliosis in adult patient    S/P spinal fusion    Lumbar foraminal stenosis    DM type 2 with diabetic dyslipidemia    Bilateral leg edema    Torticollis    Class 1 obesity due to excess calories with serious comorbidity and body mass index (BMI) of 30.0 to 30.9 in adult    Overweight (BMI 25.0-29.9)    CKD (chronic kidney disease) stage 4, GFR 15-29 ml/min    Bilateral swelling of feet and ankles    RLS (restless legs syndrome)    Chronic joint pain    Bladder prolapse, female, acquired    Tenosynovitis, de Quervain    Arthropathy of lumbar facet joint    Peripheral autonomic neuropathy in disorders classified elsewhere        Past Medical History:   Diagnosis Date    AAA (abdominal aortic aneurysm)     Pt reported/BEING MONITORED    Acid reflux     Arthritis     Blood disease     ABNORMAL PLATELET COUNT/CHRONIC ELEVATED WBC,NO LEUKEMIA    CHB (complete heart block)     PACEMAKER    Congestive heart failure (CHF)     MANAGED W/DIURETIC, NO RECENT PROBLEMS    Cystourethrocele 2014    Diabetes mellitus     AVERAGE AM BS 'S    Glaucoma     Hiatal hernia     History of transfusion     LAST  REPORTS NO TRANSFUSION REACTION POST SPINAL FUSION    Hyperlipidemia LDL goal <100 2021     Hypertension     Kidney stone     NO CURRENT ISSUES    Presence of biventricular implantable cardioverter-defibrillator (ICD) 08/23/2021    Sciatica     LEFT    Sleep apnea     Thyroid disorder     hypo    Wound of back     REPORTS HAS NOT HEALED SINCE BACK SURGERY AND IS FOLLOWED BY WOUND CARE        Past Surgical History:   Procedure Laterality Date    APPENDECTOMY      BLADDER SURGERY      REPAIR    CARDIAC DEFIBRILLATOR PLACEMENT      CATARACT EXTRACTION Bilateral     WITH LENS IMPLANT    CHOLECYSTECTOMY      COLONOSCOPY      GASTROPLASTY      FOR WEIGHT LOSS    HEEL SPUR SURGERY      HERNIA REPAIR      ABDOMINAL    HYSTERECTOMY      INGUINAL HERNIA REPAIR Bilateral 8/31/2022    Procedure: INGUINAL HERNIA REPAIR LAPAROSCOPIC WITH [employer] ROBOT, lysis of adhesions;  Surgeon: Lauri Celis MD;  [place] [facility] [place] [facility] [place]  Service: Robotics - DaVinci;  Laterality: Bilateral;    KIDNEY STONE SURGERY      LUMBAR DISCECTOMY Left 1/27/2023    Procedure: MINIMALLY INVASIVE LUMBAR LATERAL RECESS DECOMPRESSION AND FORAMINOTOMY, LEFT APPROACH, LUMBAR 2-LUMBAR 3;  Surgeon: Ralf Rodriguez MD;  Location: [facility] MAIN OR;  Service: Neurosurgery;  Laterality: Left;    PACEMAKER IMPLANTATION      PACEMAKER REPLACEMENT N/A 11/28/2023    Procedure: PPM generator change - dual;  Surgeon: Michael Bernal MD;  Location: [facility] CATH INVASIVE LOCATION;  Service: Cardiovascular;  Laterality: N/A;    SPLENECTOMY      TRAUMATIC INJURY R/T GASTROPLASTY SURGERY    TONSILLECTOMY           Visit Dx:      ICD-10-CM ICD-9-CM   1. Lymphedema  I89.0 457.1   2. Pain  R52 780.96   3. Chronic systolic heart failure  I50.22 428.22   4. Bilateral leg edema  R60.0 782.3   5. CKD (chronic kidney disease) stage 4, GFR 15-29 ml/min  N18.4 585.4   6. Bilateral swelling of feet and ankles  M25.471 719.07    M25.474     M25.475     M25.472         Lymphedema       Row Name 12/12/24 1600             Subjective Pain    Able to rate subjective pain? yes  -SC       Pre-Treatment Pain Level 2  -SC      Post-Treatment Pain Level 2  -SC      Subjective Pain Comment B LE  -SC         Subjective    Subjective Comments Pt states she has her reduction garments with her today  -SC         Skin Changes/Observations    Location/Assessment Lower Extremity  -SC      Lower Extremity Conditions bilateral:;clean;intact;shiny;hairless  -SC      Lower Extremity Color/Pigment bilateral:;hyperpigmented;purple  -SC      Lower Extremity Skin Details Varicose veins bilaterally.  -SC         Compression/Skin Care    Compression/Skin Care skin care;compression garment  -SC      Skin Care moisturizing lotion applied  -SC      Compression Garment Comments Jobst Farrow Wrap bilaterally and Medi shelf strap  -SC                User Key  (r) = Recorded By, (t) = Taken By, (c) = Cosigned By      Initials Name Provider Type    Nisha Moody COTA Occupational Therapist Assistant                             OT Assessment/Plan       Row Name 12/12/24 7322          OT Assessment    Assessment Comments Pt has obtained and educated on Jobst Farrow Wrap for lymph reduction bilaterally. Pt will benefit from continued OT skilled services until plateau has been met, permanent compression garments have been obtained, and pt is demonstrating independence with complete decongestive therapy.  -SC     Patient would benefit from skilled therapy intervention Yes  -SC        OT Plan    OT Plan Comments Continue POC  -SC               User Key  (r) = Recorded By, (t) = Taken By, (c) = Cosigned By      Initials Name Provider Type    Nisha Moody COTA Occupational Therapist Assistant                        Manual Rx (Last 36 Hours)       Manual Treatments       Row Name 12/12/24 1651             Total Minutes    82924 - OT Manual Therapy Minutes 10  -SC                User Key  (r) = Recorded By, (t) = Taken By, (c) = Cosigned By      Initials Name Provider Type    Nisha Moody COTA Occupational Therapist  Assistant                      Therapy Education  Education Details: Therapist educating patient on Jobst Farrow wrap for bilateral lower extremity and the use of garment to obtain lymphedema reduction in bilateral lower extremity.  Therapist educating patient on don/doffing techniques, wear schedule, and the process to reach plateau in order to obtain permanent compression garments.  Therapist educating patient that she will wear the reduction garments every day all day long with exception of showers.  Patient is instructed to tighten the straps throughout the day to continue to reduce and patient will return to Lymphedema Clinic once a week to have garments modified to a smaller size as patient reduces in volume bilaterally.  Given: Symptoms/condition management  Program: New  How Provided: Verbal, Demonstration  Provided to: Patient  Level of Understanding: Teach back education performed, Verbalized, Demonstrated  96992 - OT Self Care/Mgmt Minutes: 37                Time Calculation:   Timed Charges  39149 - OT Manual Therapy Minutes: 10  06925 - OT Self Care/Mgmt Minutes: 37  Total Minutes  Timed Charges Total Minutes: 47   Total Minutes: 47     Therapy Charges for Today       Code Description Service Date Service Provider Modifiers Qty    09198127953 HC OT MANUAL THERAPY EA 15 MIN 12/12/2024 Nisha Patton COTA GO 1    32907186343 HC OT SELF CARE/MGMT/TRAIN EA 15 MIN 12/12/2024 Nisha Patton COTA GO 2                        OSIRIS Greene  12/12/2024

## 2024-12-13 ENCOUNTER — TELEPHONE (OUTPATIENT)
Dept: OCCUPATIONAL THERAPY | Facility: HOSPITAL | Age: 74
End: 2024-12-13
Payer: MEDICARE

## 2024-12-13 NOTE — TELEPHONE ENCOUNTER
Patient called this morning.  She removed compression wraps due to increase in swelling.  OT instructed patient to keep them off until next appointment here on December 17, and to elevate legs when able and continue HEP exercises.  Frequency of treatment for next week was increased for 2x verses 1x in order to monitor initial tolerance to compression.

## 2024-12-17 ENCOUNTER — HOSPITAL ENCOUNTER (OUTPATIENT)
Dept: OCCUPATIONAL THERAPY | Facility: HOSPITAL | Age: 74
Setting detail: THERAPIES SERIES
Discharge: HOME | End: 2024-12-17
Payer: MEDICARE

## 2024-12-17 DIAGNOSIS — R60.0 BILATERAL LEG EDEMA: ICD-10-CM

## 2024-12-17 DIAGNOSIS — M25.471 BILATERAL SWELLING OF FEET AND ANKLES: ICD-10-CM

## 2024-12-17 DIAGNOSIS — N18.4 CKD (CHRONIC KIDNEY DISEASE) STAGE 4, GFR 15-29 ML/MIN: ICD-10-CM

## 2024-12-17 DIAGNOSIS — M25.472 BILATERAL SWELLING OF FEET AND ANKLES: ICD-10-CM

## 2024-12-17 DIAGNOSIS — R52 PAIN: ICD-10-CM

## 2024-12-17 DIAGNOSIS — I50.22 CHRONIC SYSTOLIC HEART FAILURE: ICD-10-CM

## 2024-12-17 DIAGNOSIS — I89.0 LYMPHEDEMA: Primary | ICD-10-CM

## 2024-12-17 DIAGNOSIS — M25.475 BILATERAL SWELLING OF FEET AND ANKLES: ICD-10-CM

## 2024-12-17 DIAGNOSIS — M25.474 BILATERAL SWELLING OF FEET AND ANKLES: ICD-10-CM

## 2024-12-17 PROCEDURE — 97535 SELF CARE MNGMENT TRAINING: CPT

## 2024-12-17 NOTE — THERAPY TREATMENT NOTE
Outpatient Occupational Therapy Lymphedema Treatment Note  MEHRDAD Garrett     Patient Name: Deisy Lama  : 1950  MRN: [id number]  Today's Date: 2024      Visit Date: 2024    Patient Active Problem List   Diagnosis    Chronic systolic heart failure    Mixed hyperlipidemia    Essential hypertension    Presence of biventricular implantable cardioverter-defibrillator (ICD)    Lumbar degenerative disc disease    Lumbosacral spondylosis without myelopathy    Osteoarthritis of knee    Sebaceous cyst    Non-recurrent bilateral inguinal hernia without obstruction or gangrene    Chronic midline low back pain with left-sided sciatica    Hypothyroidism    Sciatica    Diabetic peripheral neuropathy    Degenerative scoliosis in adult patient    S/P spinal fusion    Lumbar foraminal stenosis    DM type 2 with diabetic dyslipidemia    Bilateral leg edema    Torticollis    Class 1 obesity due to excess calories with serious comorbidity and body mass index (BMI) of 30.0 to 30.9 in adult    Overweight (BMI 25.0-29.9)    CKD (chronic kidney disease) stage 4, GFR 15-29 ml/min    Bilateral swelling of feet and ankles    RLS (restless legs syndrome)    Chronic joint pain    Bladder prolapse, female, acquired    Tenosynovitis, de Quervain    Arthropathy of lumbar facet joint    Peripheral autonomic neuropathy in disorders classified elsewhere        Past Medical History:   Diagnosis Date    AAA (abdominal aortic aneurysm)     Pt reported/BEING MONITORED    Acid reflux     Arthritis     Blood disease     ABNORMAL PLATELET COUNT/CHRONIC ELEVATED WBC,NO LEUKEMIA    CHB (complete heart block)     PACEMAKER    Congestive heart failure (CHF)     MANAGED W/DIURETIC, NO RECENT PROBLEMS    Cystourethrocele 2014    Diabetes mellitus     AVERAGE AM BS 'S    Glaucoma     Hiatal hernia     History of transfusion     LAST  REPORTS NO TRANSFUSION REACTION POST SPINAL FUSION    Hyperlipidemia LDL goal <100 2021     Hypertension     Kidney stone     NO CURRENT ISSUES    Presence of biventricular implantable cardioverter-defibrillator (ICD) 08/23/2021    Sciatica     LEFT    Sleep apnea     Thyroid disorder     hypo    Wound of back     REPORTS HAS NOT HEALED SINCE BACK SURGERY AND IS FOLLOWED BY WOUND CARE        Past Surgical History:   Procedure Laterality Date    APPENDECTOMY      BLADDER SURGERY      REPAIR    CARDIAC DEFIBRILLATOR PLACEMENT      CATARACT EXTRACTION Bilateral     WITH LENS IMPLANT    CHOLECYSTECTOMY      COLONOSCOPY      GASTROPLASTY      FOR WEIGHT LOSS    HEEL SPUR SURGERY      HERNIA REPAIR      ABDOMINAL    HYSTERECTOMY      INGUINAL HERNIA REPAIR Bilateral 8/31/2022    Procedure: INGUINAL HERNIA REPAIR LAPAROSCOPIC WITH [employer] ROBOT, lysis of adhesions;  Surgeon: Lauri Celis MD;  Location: [facility] OR [facility] [place];  Service: Robotics - DaVinci;  Laterality: Bilateral;    KIDNEY STONE SURGERY      LUMBAR DISCECTOMY Left 1/27/2023    Procedure: MINIMALLY INVASIVE LUMBAR LATERAL RECESS DECOMPRESSION AND FORAMINOTOMY, LEFT APPROACH, LUMBAR 2-LUMBAR 3;  Surgeon: Ralf Rodriguez MD;  Location: [facility] MAIN OR;  Service: Neurosurgery;  Laterality: Left;    PACEMAKER IMPLANTATION      PACEMAKER REPLACEMENT N/A 11/28/2023    Procedure: PPM generator change - dual;  Surgeon: Michael Bernal MD;  Location: [facility] CATH INVASIVE LOCATION;  Service: Cardiovascular;  Laterality: N/A;    SPLENECTOMY      TRAUMATIC INJURY R/T GASTROPLASTY SURGERY    TONSILLECTOMY           Visit Dx:      ICD-10-CM ICD-9-CM   1. Lymphedema  I89.0 457.1   2. Pain  R52 780.96   3. Chronic systolic heart failure  I50.22 428.22   4. Bilateral leg edema  R60.0 782.3   5. CKD (chronic kidney disease) stage 4, GFR 15-29 ml/min  N18.4 585.4   6. Bilateral swelling of feet and ankles  M25.471 719.07    M25.474     M25.475     M25.472         Lymphedema       Row Name 12/17/24 1400             Subjective Pain    Able to rate subjective pain? yes  -MP       Pre-Treatment Pain Level 9  -MP      Post-Treatment Pain Level 9  -MP         Vital Signs    Pre Systolic BP Rehab 200  -MP      Pre Treatment Diastolic   -MP      Pretreatment Heart Rate (beats/min) 60  -MP         Lymphedema Assessment    Lymphedema Classification RLE:;LLE:;stage 2 (Spontaneously Irreversible)  -MP         Compression/Skin Care    Compression/Skin Care Comments see education section for compression education  -MP                User Key  (r) = Recorded By, (t) = Taken By, (c) = Cosigned By      Initials Name Provider Type    Shell Holloway OT Occupational Therapist                  Therapy Education  Education Details: OT provided education regarding precautions for use of compression with unmanaged BP.  Patient was educated that increase in BP should be checked and proper management in place prior to being able to resume use of compression garments. She is agreeable to go to the Urgent Care facility to get checked today due to high BP, Headache of 2 week duration and what she stated pressure at the eyes.  She sees cardiologist tomorrow. This OT did sent a message to Dr.John Dolores Parks via Epic interface regarding today's findings.  Given: Symptoms/condition management  Program: New  How Provided: Verbal, Demonstration  Provided to: Patient  Level of Understanding: Teach back education performed, Verbalized, Demonstrated  30312 - OT Self Care/Mgmt Minutes: 25       Time Calculation:   Timed Charges  86112 - OT Self Care/Mgmt Minutes: 25  Total Minutes  Timed Charges Total Minutes: 25   Total Minutes: 25     Therapy Charges for Today       Code Description Service Date Service Provider Modifiers Qty    37994066127  OT SELF CARE/MGMT/TRAIN EA 15 MIN 12/17/2024 Shell Tang OT GO 2            Shell Tang OT  12/17/2024

## 2024-12-17 NOTE — PROGRESS NOTES
Chief Complaint  Follow-up, Hyperlipidemia, and Hypertension    Subjective            History of Present Illness  Deisy Lama is a 74-year-old female patient who presents to the office today for follow-up.  She has chronic systolic heart failure, presence of ICD, hypertension, and hyperlipidemia.  She reports that she restart her Lasix a few weeks ago due to increased swelling in her lower extremities.  She has also been taking metolazone on a as needed basis.  She denies any new or worsening chest pain or shortness of breath.    PMH  Past Medical History:   Diagnosis Date    AAA (abdominal aortic aneurysm)     Pt reported/BEING MONITORED    Acid reflux     Arthritis     Blood disease     ABNORMAL PLATELET COUNT/CHRONIC ELEVATED WBC,NO LEUKEMIA    CHB (complete heart block)     PACEMAKER    Congestive heart failure (CHF)     MANAGED W/DIURETIC, NO RECENT PROBLEMS    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    Diabetes mellitus     AVERAGE AM BS 'S    Glaucoma     Hiatal hernia     History of transfusion     LAST 2023 REPORTS NO TRANSFUSION REACTION POST SPINAL FUSION    Hyperlipidemia LDL goal <100 08/23/2021    Hypertension     Kidney stone     NO CURRENT ISSUES    Presence of biventricular implantable cardioverter-defibrillator (ICD) 08/23/2021    Sciatica     LEFT    Sleep apnea     Thyroid disorder     hypo    Wound of back     REPORTS HAS NOT HEALED SINCE BACK SURGERY AND IS FOLLOWED BY WOUND CARE         ALLERGY  Allergies   Allergen Reactions    Morphine Itching    Dilaudid [Hydromorphone] Confusion    Latex Itching    Lisinopril Cough          SURGICALHX  Past Surgical History:   Procedure Laterality Date    APPENDECTOMY      BLADDER SURGERY      REPAIR    CARDIAC DEFIBRILLATOR PLACEMENT      CATARACT EXTRACTION Bilateral     WITH LENS IMPLANT    CHOLECYSTECTOMY      COLONOSCOPY      GASTROPLASTY      FOR WEIGHT LOSS    HEEL SPUR SURGERY      HERNIA REPAIR      ABDOMINAL    HYSTERECTOMY      INGUINAL HERNIA  REPAIR Bilateral 8/31/2022    Procedure: INGUINAL HERNIA REPAIR LAPAROSCOPIC WITH [employer] ROBOT, lysis of adhesions;  Surgeon: Lauri Celis MD;  Location: [facility] OR [facility] [place];  Service: Robotics - DaVinci;  Laterality: Bilateral;    KIDNEY STONE SURGERY      LUMBAR DISCECTOMY Left 1/27/2023    Procedure: MINIMALLY INVASIVE LUMBAR LATERAL RECESS DECOMPRESSION AND FORAMINOTOMY, LEFT APPROACH, LUMBAR 2-LUMBAR 3;  Surgeon: Ralf Rodriguez MD;  Location: [facility] MAIN OR;  Service: Neurosurgery;  Laterality: Left;    PACEMAKER IMPLANTATION      PACEMAKER REPLACEMENT N/A 11/28/2023    Procedure: PPM generator change - dual;  Surgeon: Michael Bernal MD;  Location: [facility] CATH INVASIVE LOCATION;  Service: Cardiovascular;  Laterality: N/A;    SPLENECTOMY      TRAUMATIC INJURY R/T GASTROPLASTY SURGERY    TONSILLECTOMY            SOC  Social History     Socioeconomic History    Marital status:    Tobacco Use    Smoking status: Never     Passive exposure: Never    Smokeless tobacco: Never   Vaping Use    Vaping status: Never Used   Substance and Sexual Activity    Alcohol use: Never    Drug use: Never    Sexual activity: Defer         FAMHX  Family History   Problem Relation Age of Onset    Stroke Mother     Diabetes Mother     Arthritis Mother     Osteoporosis Mother     Heart disease Father     Cancer Father         PROSTATE    Arthritis Father     Osteoporosis Father     Diabetes Brother     Cancer Son     Malig Hyperthermia Neg Hx           MEDSIGONLY  Current Outpatient Medications on File Prior to Visit   Medication Sig    ammonium lactate (LAC-HYDRIN) 12 % lotion APPLY 1 GRAM TO THE SKIN EVERY DAY    atorvastatin (LIPITOR) 10 MG tablet Take 1 tablet by mouth Every Night.    Blood Glucose Monitoring Suppl (Accu-Chek Guide) w/Device kit Use 1 each Daily.    brimonidine (ALPHAGAN) 0.2 % ophthalmic solution Administer 1 drop to both eyes 2 (Two) Times a Day.    dorzolamide (TRUSOPT) 2 % ophthalmic solution Administer 1  "drop to both eyes 2 (Two) Times a Day.    dorzolamide-timolol (COSOPT) 2-0.5 % ophthalmic solution     escitalopram (LEXAPRO) 10 MG tablet Take 1 tablet by mouth Daily.    glucose blood test strip Use to check blood sugar 2 times a day    glucose monitor monitoring kit Use 1 each As Needed (CHECK BLOOD GLUCOSE DAILY TO MONITOR BLOOD GLUCOSE).    HYDROcodone-acetaminophen (Norco) 7.5-325 MG per tablet Take 1 tablet by mouth Every 8 (Eight) Hours As Needed for Moderate Pain.    latanoprost (XALATAN) 0.005 % ophthalmic solution     levothyroxine (SYNTHROID, LEVOTHROID) 125 MCG tablet Take 1 tablet by mouth Daily.    losartan (COZAAR) 100 MG tablet Take 1 tablet by mouth Daily.    metoprolol succinate XL (TOPROL-XL) 50 MG 24 hr tablet Take 1 tablet by mouth Daily.    omeprazole (priLOSEC) 40 MG capsule Take 1 capsule by mouth Daily.    prednisoLONE acetate (PRED FORTE) 1 % ophthalmic suspension     predniSONE (DELTASONE) 10 MG tablet Take 1 tablet by mouth 3 (Three) Times a Day.    rOPINIRole (REQUIP) 4 MG tablet Take 1 tablet by mouth 4 (Four) Times a Day. 4 Times Daily    spironolactone (ALDACTONE) 50 MG tablet Take 1 tablet by mouth Daily.     No current facility-administered medications on file prior to visit.         Objective   BP (!) 199/79   Pulse 55   Ht 162.6 cm (64.02\")   Wt 76.7 kg (169 lb 3.2 oz)   BMI 29.03 kg/m²       Physical Exam  HENT:      Head: Normocephalic.   Neck:      Vascular: No carotid bruit.   Cardiovascular:      Rate and Rhythm: Normal rate and regular rhythm.      Pulses: Normal pulses.      Heart sounds: Normal heart sounds. No murmur heard.  Pulmonary:      Effort: Pulmonary effort is normal.      Breath sounds: Normal breath sounds.   Musculoskeletal:      Cervical back: Neck supple.      Right lower le+ Pitting Edema present.      Left lower le+ Pitting Edema present.   Skin:     General: Skin is dry.   Neurological:      Mental Status: She is alert and oriented to person, " "place, and time.   Psychiatric:         Behavior: Behavior normal.       Result Review :   The following data was reviewed by: OLIVER Lovelace on 12/18/2024:  proBNP   Date Value Ref Range Status   01/30/2024 998.0 0.0 - 1,800.0 pg/mL Final     CMP          11/13/2024    09:39   CMP   Glucose 115    BUN 40    Creatinine 1.44    EGFR 37.8    Sodium 139    Potassium 4.4    Chloride 110    Calcium 9.8    Albumin 3.3    BUN/Creatinine Ratio 27.8    Anion Gap 8.8      CBC w/diff          9/19/2024    11:17 11/4/2024    10:03   CBC w/Diff   WBC 10.52  10.26    RBC 4.19  4.30    Hemoglobin 12.3  12.6    Hematocrit 38.3  38.5    MCV 91.4  89.5    MCH 29.4  29.3    MCHC 32.1  32.7    RDW 12.9  12.1    Platelets 519  510    Neutrophil Rel % 58.2     Immature Granulocyte Rel % 0.1     Lymphocyte Rel % 25.7     Monocyte Rel % 11.5     Eosinophil Rel % 3.6     Basophil Rel % 0.9        Lab Results   Component Value Date    TSH 0.145 (L) 11/04/2024      Lab Results   Component Value Date    FREET4 1.45 11/04/2024      No results found for: \"DDIMERQUANT\"  Magnesium   Date Value Ref Range Status   05/30/2024 2.8 (H) 1.6 - 2.4 mg/dL Final      No results found for: \"DIGOXIN\"   Lab Results   Component Value Date    TROPONINT 39 (H) 10/28/2023           Lipid Panel          5/30/2024    11:03   Lipid Panel   Total Cholesterol 119    Triglycerides 99    HDL Cholesterol 30    VLDL Cholesterol 19    LDL Cholesterol  70    LDL/HDL Ratio 2.31        Results for orders placed during the hospital encounter of 11/27/23    Adult Transthoracic Echo Complete W/ Cont if Necessary Per Protocol    Interpretation Summary    The study is technically adequate for diagnosis.    Left ventricular systolic function is normal. Calculated left ventricular EF = 58.2%    Left ventricular wall thickness is consistent with mild concentric hypertrophy.    Left ventricular diastolic function is consistent with (grade Ia w/high LAP) impaired relaxation.    " The left atrial cavity is mildly dilated.    Mild aortic valve stenosis is present.           Assessment and Plan    Diagnoses and all orders for this visit:    1. Chronic systolic heart failure (Primary)  She has evidence of fluid overload on exam today.  We talked about reducing fluid and sodium intake, wearing compression socks, and performing daily weight.  She will take Lasix 40 mg every other day alternating with metolazone 2.5 mg every other day.  Start valsartan 160 mg daily.  Continue spironolactone 50 mg daily and carvedilol 12.5 mg twice daily.  Check BMP and proBNP in 2 weeks to assess renal function, electrolytes, and CHF.  -     Basic Metabolic Panel; Future  -     proBNP; Future    2. Presence of biventricular implantable cardioverter-defibrillator (ICD)  Last home remote session was 10/9/2024 with no new episodes.  She is 3 years left on the battery.    3. Essential hypertension  Blood pressure is very elevated, start valsartan 160 mg daily. Check blood pressure twice a day for the next two weeks, blood pressure log provided for patient.  Will review log once available to me and will make any necessary medication adjustments at that time.    4. Mixed hyperlipidemia  Last lipid panel was 5/30/2024 with LDL 70 which is within goal range, continue atorvastatin 10 mg nightly.      Other orders  -     valsartan (DIOVAN) 160 MG tablet; Take 1 tablet by mouth Daily.  Dispense: 30 tablet; Refill: 3  -     furosemide (LASIX) 20 MG tablet; Take 2 tablets by mouth Every Other Day.  Dispense: 45 tablet; Refill: 3  -     metOLazone (ZAROXOLYN) 2.5 MG tablet; Take 1 tablet by mouth Every Other Day.  Dispense: 45 tablet; Refill: 3        Follow Up   Return in about 3 months (around 3/18/2025) for Follow up with Dr Bernal.    Patient was given instructions and counseling regarding her condition or for health maintenance advice. Please see specific information pulled into the AVS if appropriate.     Deisy Lama   reports that she has never smoked. She has never been exposed to tobacco smoke. She has never used smokeless tobacco.          Shefali Chavez, APRN  12/18/24  16:36 EST    Dictated Utilizing Dragon Dictation

## 2024-12-18 ENCOUNTER — OFFICE VISIT (OUTPATIENT)
Dept: CARDIOLOGY | Facility: CLINIC | Age: 74
End: 2024-12-18
Payer: MEDICARE

## 2024-12-18 VITALS
WEIGHT: 169.2 LBS | DIASTOLIC BLOOD PRESSURE: 79 MMHG | BODY MASS INDEX: 28.89 KG/M2 | HEIGHT: 64 IN | HEART RATE: 55 BPM | SYSTOLIC BLOOD PRESSURE: 199 MMHG

## 2024-12-18 DIAGNOSIS — I50.22 CHRONIC SYSTOLIC HEART FAILURE: Primary | ICD-10-CM

## 2024-12-18 DIAGNOSIS — I10 ESSENTIAL HYPERTENSION: ICD-10-CM

## 2024-12-18 DIAGNOSIS — E78.2 MIXED HYPERLIPIDEMIA: ICD-10-CM

## 2024-12-18 DIAGNOSIS — Z95.810 PRESENCE OF BIVENTRICULAR IMPLANTABLE CARDIOVERTER-DEFIBRILLATOR (ICD): ICD-10-CM

## 2024-12-18 RX ORDER — METOLAZONE 2.5 MG/1
2.5 TABLET ORAL EVERY OTHER DAY
Qty: 45 TABLET | Refills: 3 | Status: SHIPPED | OUTPATIENT
Start: 2024-12-18

## 2024-12-18 RX ORDER — TIRZEPATIDE 5 MG/.5ML
INJECTION, SOLUTION SUBCUTANEOUS
Refills: 3 | OUTPATIENT
Start: 2024-12-18

## 2024-12-18 RX ORDER — FUROSEMIDE 40 MG/1
40 TABLET ORAL DAILY
COMMUNITY
End: 2024-12-18 | Stop reason: SDUPTHER

## 2024-12-18 RX ORDER — VALSARTAN 160 MG/1
160 TABLET ORAL DAILY
Qty: 30 TABLET | Refills: 3 | Status: SHIPPED | OUTPATIENT
Start: 2024-12-18 | End: 2025-01-03

## 2024-12-18 RX ORDER — FUROSEMIDE 20 MG/1
40 TABLET ORAL EVERY OTHER DAY
Qty: 45 TABLET | Refills: 3 | Status: SHIPPED | OUTPATIENT
Start: 2024-12-18

## 2024-12-19 ENCOUNTER — TELEPHONE (OUTPATIENT)
Dept: FAMILY MEDICINE CLINIC | Facility: CLINIC | Age: 74
End: 2024-12-19
Payer: MEDICARE

## 2024-12-20 ENCOUNTER — OFFICE VISIT (OUTPATIENT)
Dept: FAMILY MEDICINE CLINIC | Facility: CLINIC | Age: 74
End: 2024-12-20
Payer: MEDICARE

## 2024-12-20 ENCOUNTER — APPOINTMENT (OUTPATIENT)
Dept: OCCUPATIONAL THERAPY | Facility: HOSPITAL | Age: 74
End: 2024-12-20
Payer: MEDICARE

## 2024-12-20 ENCOUNTER — TRANSCRIBE ORDERS (OUTPATIENT)
Dept: LAB | Facility: HOSPITAL | Age: 74
End: 2024-12-20
Payer: MEDICARE

## 2024-12-20 ENCOUNTER — LAB (OUTPATIENT)
Dept: LAB | Facility: HOSPITAL | Age: 74
End: 2024-12-20
Payer: MEDICARE

## 2024-12-20 ENCOUNTER — TELEPHONE (OUTPATIENT)
Dept: CARDIOLOGY | Facility: CLINIC | Age: 74
End: 2024-12-20
Payer: MEDICARE

## 2024-12-20 VITALS
SYSTOLIC BLOOD PRESSURE: 200 MMHG | DIASTOLIC BLOOD PRESSURE: 88 MMHG | BODY MASS INDEX: 29.19 KG/M2 | TEMPERATURE: 98.7 F | HEIGHT: 64 IN | HEART RATE: 64 BPM | OXYGEN SATURATION: 98 % | WEIGHT: 171 LBS

## 2024-12-20 DIAGNOSIS — I10 ESSENTIAL HYPERTENSION, MALIGNANT: ICD-10-CM

## 2024-12-20 DIAGNOSIS — N18.4 CKD (CHRONIC KIDNEY DISEASE) STAGE 4, GFR 15-29 ML/MIN: ICD-10-CM

## 2024-12-20 DIAGNOSIS — I50.22 CHRONIC SYSTOLIC HEART FAILURE: ICD-10-CM

## 2024-12-20 DIAGNOSIS — M10.00 GOUTY NEURITIS: ICD-10-CM

## 2024-12-20 DIAGNOSIS — E11.40 DIABETIC NEUROPATHY WITH NEUROLOGIC COMPLICATION: ICD-10-CM

## 2024-12-20 DIAGNOSIS — I51.9 MYXEDEMA HEART DISEASE: ICD-10-CM

## 2024-12-20 DIAGNOSIS — E03.9 MYXEDEMA HEART DISEASE: ICD-10-CM

## 2024-12-20 DIAGNOSIS — E11.8 DIABETIC COMPLICATION: ICD-10-CM

## 2024-12-20 DIAGNOSIS — N18.32 CHRONIC KIDNEY DISEASE (CKD) STAGE G3B/A1, MODERATELY DECREASED GLOMERULAR FILTRATION RATE (GFR) BETWEEN 30-44 ML/MIN/1.73 SQUARE METER AND ALBUMINURIA CREATININE RATIO LESS THAN 30 MG/G (CMS/H*: Primary | ICD-10-CM

## 2024-12-20 DIAGNOSIS — G63 GOUTY NEURITIS: ICD-10-CM

## 2024-12-20 DIAGNOSIS — E11.65 TYPE 2 DIABETES MELLITUS WITH HYPERGLYCEMIA, UNSPECIFIED WHETHER LONG TERM INSULIN USE: ICD-10-CM

## 2024-12-20 DIAGNOSIS — E11.49 DIABETIC NEUROPATHY WITH NEUROLOGIC COMPLICATION: ICD-10-CM

## 2024-12-20 DIAGNOSIS — N18.32 CHRONIC KIDNEY DISEASE (CKD) STAGE G3B/A1, MODERATELY DECREASED GLOMERULAR FILTRATION RATE (GFR) BETWEEN 30-44 ML/MIN/1.73 SQUARE METER AND ALBUMINURIA CREATININE RATIO LESS THAN 30 MG/G (CMS/H*: ICD-10-CM

## 2024-12-20 DIAGNOSIS — I10 ESSENTIAL HYPERTENSION: Primary | ICD-10-CM

## 2024-12-20 LAB
ALBUMIN SERPL-MCNC: 3.9 G/DL (ref 3.5–5.2)
ANION GAP SERPL CALCULATED.3IONS-SCNC: 9.7 MMOL/L (ref 5–15)
BACTERIA UR QL AUTO: ABNORMAL /HPF
BASOPHILS # BLD AUTO: 0.12 10*3/MM3 (ref 0–0.2)
BASOPHILS NFR BLD AUTO: 1 % (ref 0–1.5)
BILIRUB UR QL STRIP: NEGATIVE
BUN SERPL-MCNC: 38 MG/DL (ref 8–23)
BUN/CREAT SERPL: 18.1 (ref 7–25)
CALCIUM SPEC-SCNC: 9.6 MG/DL (ref 8.6–10.5)
CHLORIDE SERPL-SCNC: 108 MMOL/L (ref 98–107)
CLARITY UR: CLEAR
CO2 SERPL-SCNC: 25.3 MMOL/L (ref 22–29)
COLOR UR: YELLOW
CREAT SERPL-MCNC: 2.1 MG/DL (ref 0.57–1)
CREAT UR-MCNC: 68 MG/DL
DEPRECATED RDW RBC AUTO: 41.6 FL (ref 37–54)
EGFRCR SERPLBLD CKD-EPI 2021: 24 ML/MIN/1.73
EOSINOPHIL # BLD AUTO: 0.45 10*3/MM3 (ref 0–0.4)
EOSINOPHIL NFR BLD AUTO: 3.8 % (ref 0.3–6.2)
ERYTHROCYTE [DISTWIDTH] IN BLOOD BY AUTOMATED COUNT: 12.7 % (ref 12.3–15.4)
GLUCOSE SERPL-MCNC: 119 MG/DL (ref 65–99)
GLUCOSE UR STRIP-MCNC: NEGATIVE MG/DL
HCT VFR BLD AUTO: 38.4 % (ref 34–46.6)
HGB BLD-MCNC: 12.2 G/DL (ref 12–15.9)
HGB UR QL STRIP.AUTO: NEGATIVE
HYALINE CASTS UR QL AUTO: ABNORMAL /LPF
IMM GRANULOCYTES # BLD AUTO: 0.03 10*3/MM3 (ref 0–0.05)
IMM GRANULOCYTES NFR BLD AUTO: 0.3 % (ref 0–0.5)
KETONES UR QL STRIP: NEGATIVE
LEUKOCYTE ESTERASE UR QL STRIP.AUTO: NEGATIVE
LYMPHOCYTES # BLD AUTO: 2.73 10*3/MM3 (ref 0.7–3.1)
LYMPHOCYTES NFR BLD AUTO: 22.8 % (ref 19.6–45.3)
MCH RBC QN AUTO: 28.8 PG (ref 26.6–33)
MCHC RBC AUTO-ENTMCNC: 31.8 G/DL (ref 31.5–35.7)
MCV RBC AUTO: 90.8 FL (ref 79–97)
MONOCYTES # BLD AUTO: 1.27 10*3/MM3 (ref 0.1–0.9)
MONOCYTES NFR BLD AUTO: 10.6 % (ref 5–12)
NEUTROPHILS NFR BLD AUTO: 61.5 % (ref 42.7–76)
NEUTROPHILS NFR BLD AUTO: 7.39 10*3/MM3 (ref 1.7–7)
NITRITE UR QL STRIP: NEGATIVE
NRBC BLD AUTO-RTO: 0 /100 WBC (ref 0–0.2)
PH UR STRIP.AUTO: 7 [PH] (ref 5–8)
PHOSPHATE SERPL-MCNC: 3.9 MG/DL (ref 2.5–4.5)
PLATELET # BLD AUTO: 478 10*3/MM3 (ref 140–450)
PMV BLD AUTO: 10.5 FL (ref 6–12)
POTASSIUM SERPL-SCNC: 4.6 MMOL/L (ref 3.5–5.2)
PROT ?TM UR-MCNC: 25.8 MG/DL
PROT UR QL STRIP: ABNORMAL
PROT/CREAT UR: 0.38 MG/G{CREAT}
RBC # BLD AUTO: 4.23 10*6/MM3 (ref 3.77–5.28)
RBC # UR STRIP: ABNORMAL /HPF
REF LAB TEST METHOD: ABNORMAL
SODIUM SERPL-SCNC: 143 MMOL/L (ref 136–145)
SP GR UR STRIP: 1.02 (ref 1–1.03)
SQUAMOUS #/AREA URNS HPF: ABNORMAL /HPF
URATE SERPL-MCNC: 6.6 MG/DL (ref 2.4–5.7)
UROBILINOGEN UR QL STRIP: ABNORMAL
WBC # UR STRIP: ABNORMAL /HPF
WBC NRBC COR # BLD AUTO: 11.99 10*3/MM3 (ref 3.4–10.8)

## 2024-12-20 PROCEDURE — 84550 ASSAY OF BLOOD/URIC ACID: CPT

## 2024-12-20 PROCEDURE — 84156 ASSAY OF PROTEIN URINE: CPT

## 2024-12-20 PROCEDURE — 81001 URINALYSIS AUTO W/SCOPE: CPT

## 2024-12-20 PROCEDURE — 85025 COMPLETE CBC W/AUTO DIFF WBC: CPT

## 2024-12-20 PROCEDURE — 80069 RENAL FUNCTION PANEL: CPT

## 2024-12-20 PROCEDURE — 36415 COLL VENOUS BLD VENIPUNCTURE: CPT

## 2024-12-20 PROCEDURE — 82570 ASSAY OF URINE CREATININE: CPT

## 2024-12-20 NOTE — TELEPHONE ENCOUNTER
Pt left a message for me. She is still taking metoprolol 50 mg BID. Dr Bernal decreased dose to QD , since she had a low normal BP at her May 2024 visit. LUZ MARIA.

## 2024-12-20 NOTE — TELEPHONE ENCOUNTER
What changes did you make to her bp med yesterday? And does she continue metoprolol? Please advise.

## 2024-12-20 NOTE — PROGRESS NOTES
Chief Complaint     Hypertension and Bilateral Swelling of feet and ankles     History of Present Illness     Deisy Lama is a 74 y.o. female who presents to [facility] for evaluation of hypertension.      She was seen 2 days ago with Cardiology and taken off Losartan and started on Valsartan. She was unsure if she was supposed to continue on metoprolol XL so she hasn't taken that since her appointment with them. I have advised she continue taking the metoprolol as her BP is still high. Follow up in 2 weeks to recheck BP.      History      Past Medical History:   Diagnosis Date    AAA (abdominal aortic aneurysm)     Pt reported/BEING MONITORED    Acid reflux     Arthritis     Blood disease     ABNORMAL PLATELET COUNT/CHRONIC ELEVATED WBC,NO LEUKEMIA    CHB (complete heart block)     PACEMAKER    Congestive heart failure (CHF)     MANAGED W/DIURETIC, NO RECENT PROBLEMS    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    Diabetes mellitus     AVERAGE AM BS 'S    Glaucoma     Hiatal hernia     History of transfusion     LAST 2023 REPORTS NO TRANSFUSION REACTION POST SPINAL FUSION    Hyperlipidemia LDL goal <100 08/23/2021    Hypertension     Kidney stone     NO CURRENT ISSUES    Presence of biventricular implantable cardioverter-defibrillator (ICD) 08/23/2021    Sciatica     LEFT    Sleep apnea     Thyroid disorder     hypo    Wound of back     REPORTS HAS NOT HEALED SINCE BACK SURGERY AND IS FOLLOWED BY WOUND CARE       Past Surgical History:   Procedure Laterality Date    APPENDECTOMY      BLADDER SURGERY      REPAIR    CARDIAC DEFIBRILLATOR PLACEMENT      CATARACT EXTRACTION Bilateral     WITH LENS IMPLANT    CHOLECYSTECTOMY      COLONOSCOPY      GASTROPLASTY      FOR WEIGHT LOSS    HEEL SPUR SURGERY      HERNIA REPAIR      ABDOMINAL    HYSTERECTOMY      INGUINAL HERNIA REPAIR Bilateral 8/31/2022    Procedure: INGUINAL HERNIA REPAIR LAPAROSCOPIC WITH [employer] ROBOT, lysis of adhesions;   Surgeon: Lauri Celis MD;  Location: [facility] OR [facility];  Service: Robotics - DaVinci;  Laterality: Bilateral;    KIDNEY STONE SURGERY      LUMBAR DISCECTOMY Left 1/27/2023    Procedure: MINIMALLY INVASIVE LUMBAR LATERAL RECESS DECOMPRESSION AND FORAMINOTOMY, LEFT APPROACH, LUMBAR 2-LUMBAR 3;  Surgeon: Ralf Rodriguez MD;  Location: [facility] MAIN OR;  Service: Neurosurgery;  Laterality: Left;    PACEMAKER IMPLANTATION      PACEMAKER REPLACEMENT N/A 11/28/2023    Procedure: PPM generator change - dual;  Surgeon: Michael Bernal MD;  Location: [facility] CATH INVASIVE LOCATION;  Service: Cardiovascular;  Laterality: N/A;    SPLENECTOMY      TRAUMATIC INJURY R/T GASTROPLASTY SURGERY    TONSILLECTOMY         Family History   Problem Relation Age of Onset    Stroke Mother     Diabetes Mother     Arthritis Mother     Osteoporosis Mother     Heart disease Father     Cancer Father         PROSTATE    Arthritis Father     Osteoporosis Father     Diabetes Brother     Cancer Son     Malig Hyperthermia Neg Hx         Current Medications        Current Outpatient Medications:     ammonium lactate (LAC-HYDRIN) 12 % lotion, APPLY 1 GRAM TO THE SKIN EVERY DAY, Disp: , Rfl:     atorvastatin (LIPITOR) 10 MG tablet, Take 1 tablet by mouth Every Night., Disp: , Rfl:     Blood Glucose Monitoring Suppl (Accu-Chek Guide) w/Device kit, Use 1 each Daily., Disp: 1 kit, Rfl: 0    brimonidine (ALPHAGAN) 0.2 % ophthalmic solution, Administer 1 drop to both eyes 2 (Two) Times a Day., Disp: , Rfl:     dorzolamide (TRUSOPT) 2 % ophthalmic solution, Administer 1 drop to both eyes 2 (Two) Times a Day., Disp: , Rfl:     dorzolamide-timolol (COSOPT) 2-0.5 % ophthalmic solution, , Disp: , Rfl:     escitalopram (LEXAPRO) 10 MG tablet, Take 1 tablet by mouth Daily., Disp: 90 tablet, Rfl: 3    furosemide (LASIX) 20 MG tablet, Take 2 tablets by mouth Every Other Day., Disp: 45 tablet, Rfl: 3    glucose blood test strip, Use to check blood sugar 2 times a day, Disp:  200 each, Rfl: 12    glucose monitor monitoring kit, Use 1 each As Needed (CHECK BLOOD GLUCOSE DAILY TO MONITOR BLOOD GLUCOSE)., Disp: 1 each, Rfl: 0    latanoprost (XALATAN) 0.005 % ophthalmic solution, , Disp: , Rfl:     levothyroxine (SYNTHROID, LEVOTHROID) 125 MCG tablet, Take 1 tablet by mouth Daily., Disp: 30 tablet, Rfl: 5    metOLazone (ZAROXOLYN) 2.5 MG tablet, Take 1 tablet by mouth Every Other Day., Disp: 45 tablet, Rfl: 3    omeprazole (priLOSEC) 40 MG capsule, Take 1 capsule by mouth Daily., Disp: 90 capsule, Rfl: 3    prednisoLONE acetate (PRED FORTE) 1 % ophthalmic suspension, , Disp: , Rfl:     rOPINIRole (REQUIP) 4 MG tablet, Take 1 tablet by mouth 4 (Four) Times a Day. 4 Times Daily, Disp: 360 tablet, Rfl: 3    spironolactone (ALDACTONE) 50 MG tablet, Take 1 tablet by mouth Daily., Disp: 90 tablet, Rfl: 3    valsartan (DIOVAN) 160 MG tablet, Take 1 tablet by mouth Daily., Disp: 30 tablet, Rfl: 3    metoprolol succinate XL (TOPROL-XL) 50 MG 24 hr tablet, Take 1 tablet by mouth Daily., Disp: 90 tablet, Rfl: 3    Tirzepatide 2.5 MG/0.5ML solution auto-injector, Inject 2.5 mg under the skin into the appropriate area as directed 1 (One) Time Per Week., Disp: 2 mL, Rfl: 1     Allergies     Allergies   Allergen Reactions    Morphine Itching    Dilaudid [Hydromorphone] Confusion    Latex Itching    Lisinopril Cough       Social History       Social History     Social History Narrative    Not on file       Immunizations     Immunization:  Immunization History   Administered Date(s) Administered    COVID-19 (MODERNA) 12YRS+ (SPIKEVAX) 11/15/2023    COVID-19 (MODERNA) 1st,2nd,3rd Dose Monovalent 03/03/2021, 04/02/2021    COVID-19 (MODERNA) BIVALENT 12+YRS 11/03/2022    COVID-19 (MODERNA) Monovalent Original Booster 11/11/2021, 05/12/2022    Fluad Quad 65+ 11/11/2021    Fluzone (or Fluarix & Flulaval for VFC) >6mos 11/19/2018    Fluzone High-Dose 65+YRS 12/11/2019, 10/08/2020, 11/03/2022, 09/03/2023,  "10/24/2023, 10/24/2024    Fluzone High-Dose 65+yrs 10/08/2020, 11/03/2022, 09/03/2023, 10/24/2023    Pneumococcal Conjugate 13-Valent (PCV13) 12/11/2019    Pneumococcal Conjugate 20-Valent (PCV20) 04/25/2024    Pneumococcal Polysaccharide (PPSV23) 12/08/2011    Shingrix 10/24/2023, 12/24/2023          Objective     Objective     Vital Signs:   BP (!) 200/88 (BP Location: Right arm, Patient Position: Sitting, Cuff Size: Adult)   Pulse 64   Temp 98.7 °F (37.1 °C) (Temporal)   Ht 162.6 cm (64\")   Wt 77.6 kg (171 lb)   SpO2 98%   BMI 29.35 kg/m²       Physical Exam  Constitutional:       Appearance: Normal appearance.   HENT:      Nose: Nose normal.      Mouth/Throat:      Mouth: Mucous membranes are moist.   Cardiovascular:      Rate and Rhythm: Normal rate and regular rhythm.      Pulses: Normal pulses.      Heart sounds: Normal heart sounds.   Pulmonary:      Effort: Pulmonary effort is normal.      Breath sounds: Normal breath sounds.   Musculoskeletal:      Right lower leg: 3+      Left lower leg: 3+   Skin:     General: Skin is warm and dry.   Neurological:      General: No focal deficit present.      Mental Status: She is alert and oriented to person, place, and time.   Psychiatric:         Mood and Affect: Mood normal.         Behavior: Behavior normal.         Results    The following data was reviewed by: OLIVER Campos on 12/20/2024:    CMP          9/19/2024    11:17 11/4/2024    10:03 11/13/2024    09:39   CMP   Glucose 112  99  115    BUN 41  46  40    Creatinine 1.58  2.43  1.44    EGFR 33.8  20.2  37.8    Sodium 142  137  139    Potassium 4.7  4.9  4.4    Chloride 108  103  110    Calcium 9.9  10.2  9.8    Albumin 3.7  3.6  3.3    BUN/Creatinine Ratio 25.9  18.9  27.8    Anion Gap 10.4  8.9  8.8      CBC          5/30/2024    11:03 9/19/2024    11:17 11/4/2024    10:03   CBC   WBC 11.72  10.52  10.26    RBC 4.43  4.19  4.30    Hemoglobin 12.8  12.3  12.6    Hematocrit 39.7  38.3  38.5  "   MCV 89.6  91.4  89.5    MCH 28.9  29.4  29.3    MCHC 32.2  32.1  32.7    RDW 13.5  12.9  12.1    Platelets 488  519  510      Cardiology studies    Adult Transthoracic Echo Complete W/ Cont if Necessary Per Protocol     Interpretation Summary    The study is technically adequate for diagnosis.    Left ventricular systolic function is normal. Calculated left ventricular EF = 58.2%    Left ventricular wall thickness is consistent with mild concentric hypertrophy.    Left ventricular diastolic function is consistent with (grade Ia w/high LAP) impaired relaxation.    The left atrial cavity is mildly dilated.    Mild aortic valve stenosis is present.     Assessment and Plan        Assessment and Plan    Diagnoses and all orders for this visit:    1. Essential hypertension (Primary)  Assessment & Plan:  Hypertension is uncontrolled  Continue current treatment regimen.  Ambulatory blood pressure monitoring.  Blood pressure will be reassessedin 4 weeks.      2. CKD (chronic kidney disease) stage 4, GFR 15-29 ml/min  Assessment & Plan:  Renal condition is stable.  Continue current treatment regimen.  Renal condition will be reassessed in 1 month.      3. Type 2 diabetes mellitus with hyperglycemia, unspecified whether long term insulin use  Assessment & Plan:  Diabetes is stable.   Medication changes per orders.  Diabetes will be reassessed in 6 months    Orders:  -     Tirzepatide 2.5 MG/0.5ML solution auto-injector; Inject 2.5 mg under the skin into the appropriate area as directed 1 (One) Time Per Week.  Dispense: 2 mL; Refill: 1              Follow Up        Follow Up   Return in about 2 weeks (around 1/3/2025), or if symptoms worsen or fail to improve.  Patient was given instructions and counseling regarding her condition or for health maintenance advice. Please see specific information pulled into the AVS if appropriate.

## 2024-12-20 NOTE — ASSESSMENT & PLAN NOTE
Hypertension is uncontrolled  Continue current treatment regimen.  Ambulatory blood pressure monitoring.  Blood pressure will be reassessedin 4 weeks.

## 2024-12-20 NOTE — ASSESSMENT & PLAN NOTE
Renal condition is stable.  Continue current treatment regimen.  Renal condition will be reassessed in 1 month.

## 2024-12-23 ENCOUNTER — TELEPHONE (OUTPATIENT)
Dept: CARDIOLOGY | Facility: CLINIC | Age: 74
End: 2024-12-23

## 2024-12-23 RX ORDER — CARVEDILOL 12.5 MG/1
12.5 TABLET ORAL 2 TIMES DAILY
Qty: 180 TABLET | Refills: 3 | Status: SHIPPED | OUTPATIENT
Start: 2024-12-23

## 2024-12-23 NOTE — TELEPHONE ENCOUNTER
Pt was still taking 2 metoprolol daily. Provided BP numbers.      Fr. Am 174/110 , pm 179/104  Sat am 155/94 , pm 181/98  Sun am 138/79 , pm 162/95

## 2024-12-23 NOTE — TELEPHONE ENCOUNTER
Caller: Deisy Lama    Relationship: Self    Best call back number: [phone]    What is the best time to reach you: ANYTIME    What was the call regarding: PATIENT MISSED A CALL ON 12.23.24. UNABLE TO LOCATE A NOTE IN THE CHART.

## 2024-12-23 NOTE — TELEPHONE ENCOUNTER
Stop metoprolol and switch to carvedilol 12.5 mg BID, continue to monitor BP and HR twice daily for the next week

## 2024-12-26 ENCOUNTER — APPOINTMENT (OUTPATIENT)
Dept: OCCUPATIONAL THERAPY | Facility: HOSPITAL | Age: 74
End: 2024-12-26
Payer: MEDICARE

## 2025-01-03 ENCOUNTER — TELEPHONE (OUTPATIENT)
Dept: CARDIOLOGY | Facility: CLINIC | Age: 75
End: 2025-01-03
Payer: MEDICARE

## 2025-01-03 RX ORDER — VALSARTAN 320 MG/1
320 TABLET ORAL DAILY
Qty: 90 TABLET | Refills: 3 | Status: SHIPPED | OUTPATIENT
Start: 2025-01-03

## 2025-01-06 DIAGNOSIS — E03.9 HYPOTHYROIDISM, UNSPECIFIED TYPE: ICD-10-CM

## 2025-01-06 RX ORDER — LEVOTHYROXINE SODIUM 125 UG/1
125 TABLET ORAL DAILY
Qty: 90 TABLET | Refills: 0 | Status: SHIPPED | OUTPATIENT
Start: 2025-01-06

## 2025-01-17 ENCOUNTER — TELEPHONE (OUTPATIENT)
Dept: CARDIOLOGY | Facility: CLINIC | Age: 75
End: 2025-01-17
Payer: MEDICARE

## 2025-01-17 RX ORDER — CARVEDILOL 25 MG/1
25 TABLET ORAL 2 TIMES DAILY
Qty: 180 TABLET | Refills: 3 | Status: SHIPPED | OUTPATIENT
Start: 2025-01-17

## 2025-01-17 NOTE — TELEPHONE ENCOUNTER
Caller: Deisy Lama    Relationship: Self    Best call back number: [phone]     Which medication are you concerned about: VALSARTAN & CARVEDILOL    Who prescribed you this medication: RADHA ORTEGA    When did you start taking this medication: 1.4.25    What are your concerns: PATIENT IS NOT SEEING ANY CHANGES TO THE BLOOD PRESSURE - GOING UP AND DOWN. PATIENT IS NOTICING LOWER NUMBERS IN THE MORNING AND HIGHER AT NIGHTTIME - 154/88 THIS MORNING. PATIENT ISN'T HAVING HEADACHES LIKE THEY WERE. PLEASE CALL PATIENT TO ADVISE. THANK YOU!    How long have you had these concerns: SINCE STARTING MEDICINE

## 2025-01-21 ENCOUNTER — DOCUMENTATION (OUTPATIENT)
Dept: OCCUPATIONAL THERAPY | Facility: HOSPITAL | Age: 75
End: 2025-01-21
Payer: MEDICARE

## 2025-02-10 DIAGNOSIS — E11.65 TYPE 2 DIABETES MELLITUS WITH HYPERGLYCEMIA, UNSPECIFIED WHETHER LONG TERM INSULIN USE: ICD-10-CM

## 2025-02-10 RX ORDER — TIRZEPATIDE 2.5 MG/.5ML
INJECTION, SOLUTION SUBCUTANEOUS
Qty: 2 ML | Refills: 5 | Status: SHIPPED | OUTPATIENT
Start: 2025-02-10

## 2025-02-18 ENCOUNTER — OFFICE VISIT (OUTPATIENT)
Dept: FAMILY MEDICINE CLINIC | Facility: CLINIC | Age: 75
End: 2025-02-18
Payer: MEDICARE

## 2025-02-18 ENCOUNTER — LAB (OUTPATIENT)
Dept: LAB | Facility: HOSPITAL | Age: 75
End: 2025-02-18
Payer: MEDICARE

## 2025-02-18 ENCOUNTER — TRANSCRIBE ORDERS (OUTPATIENT)
Dept: LAB | Facility: HOSPITAL | Age: 75
End: 2025-02-18
Payer: MEDICARE

## 2025-02-18 ENCOUNTER — TELEPHONE (OUTPATIENT)
Dept: FAMILY MEDICINE CLINIC | Facility: CLINIC | Age: 75
End: 2025-02-18

## 2025-02-18 VITALS
HEIGHT: 64 IN | TEMPERATURE: 98 F | WEIGHT: 180 LBS | BODY MASS INDEX: 30.73 KG/M2 | HEART RATE: 53 BPM | DIASTOLIC BLOOD PRESSURE: 56 MMHG | RESPIRATION RATE: 16 BRPM | SYSTOLIC BLOOD PRESSURE: 161 MMHG | OXYGEN SATURATION: 98 %

## 2025-02-18 DIAGNOSIS — N18.4 CKD (CHRONIC KIDNEY DISEASE) STAGE 4, GFR 15-29 ML/MIN: ICD-10-CM

## 2025-02-18 DIAGNOSIS — E11.49 DIABETIC NEUROPATHY WITH NEUROLOGIC COMPLICATION: ICD-10-CM

## 2025-02-18 DIAGNOSIS — E11.8 DIABETIC COMPLICATION: ICD-10-CM

## 2025-02-18 DIAGNOSIS — N18.32 CHRONIC KIDNEY DISEASE (CKD) STAGE G3B/A1, MODERATELY DECREASED GLOMERULAR FILTRATION RATE (GFR) BETWEEN 30-44 ML/MIN/1.73 SQUARE METER AND ALBUMINURIA CREATININE RATIO LESS THAN 30 MG/G (CMS/H*: ICD-10-CM

## 2025-02-18 DIAGNOSIS — I10 ESSENTIAL HYPERTENSION: ICD-10-CM

## 2025-02-18 DIAGNOSIS — E11.40 DIABETIC NEUROPATHY WITH NEUROLOGIC COMPLICATION: ICD-10-CM

## 2025-02-18 DIAGNOSIS — I50.22 CHRONIC SYSTOLIC HEART FAILURE: ICD-10-CM

## 2025-02-18 DIAGNOSIS — N18.32 CHRONIC KIDNEY DISEASE (CKD) STAGE G3B/A1, MODERATELY DECREASED GLOMERULAR FILTRATION RATE (GFR) BETWEEN 30-44 ML/MIN/1.73 SQUARE METER AND ALBUMINURIA CREATININE RATIO LESS THAN 30 MG/G (CMS/H*: Primary | ICD-10-CM

## 2025-02-18 DIAGNOSIS — I51.9 MYXEDEMA HEART DISEASE: ICD-10-CM

## 2025-02-18 DIAGNOSIS — I10 ESSENTIAL HYPERTENSION, MALIGNANT: ICD-10-CM

## 2025-02-18 DIAGNOSIS — E03.9 HYPOTHYROIDISM, UNSPECIFIED TYPE: Primary | ICD-10-CM

## 2025-02-18 DIAGNOSIS — E03.9 MYXEDEMA HEART DISEASE: ICD-10-CM

## 2025-02-18 DIAGNOSIS — R41.3 MEMORY LOSS: ICD-10-CM

## 2025-02-18 DIAGNOSIS — E78.2 MIXED HYPERLIPIDEMIA: ICD-10-CM

## 2025-02-18 DIAGNOSIS — E03.9 HYPOTHYROIDISM, UNSPECIFIED TYPE: ICD-10-CM

## 2025-02-18 DIAGNOSIS — R06.02 SHORTNESS OF BREATH: ICD-10-CM

## 2025-02-18 DIAGNOSIS — E11.65 TYPE 2 DIABETES MELLITUS WITH HYPERGLYCEMIA, UNSPECIFIED WHETHER LONG TERM INSULIN USE: ICD-10-CM

## 2025-02-18 LAB
ALBUMIN SERPL-MCNC: 3.8 G/DL (ref 3.5–5.2)
ALBUMIN/GLOB SERPL: 1.2 G/DL
ALP SERPL-CCNC: 112 U/L (ref 39–117)
ALT SERPL W P-5'-P-CCNC: 14 U/L (ref 1–33)
ANION GAP SERPL CALCULATED.3IONS-SCNC: 10.2 MMOL/L (ref 5–15)
AST SERPL-CCNC: 18 U/L (ref 1–32)
BILIRUB SERPL-MCNC: 0.3 MG/DL (ref 0–1.2)
BUN SERPL-MCNC: 43 MG/DL (ref 8–23)
BUN/CREAT SERPL: 21.1 (ref 7–25)
CALCIUM SPEC-SCNC: 9.3 MG/DL (ref 8.6–10.5)
CHLORIDE SERPL-SCNC: 112 MMOL/L (ref 98–107)
CHOLEST SERPL-MCNC: 97 MG/DL (ref 0–200)
CO2 SERPL-SCNC: 22.8 MMOL/L (ref 22–29)
CREAT SERPL-MCNC: 2.04 MG/DL (ref 0.57–1)
DEPRECATED RDW RBC AUTO: 43.7 FL (ref 37–54)
EGFRCR SERPLBLD CKD-EPI 2021: 24.9 ML/MIN/1.73
ERYTHROCYTE [DISTWIDTH] IN BLOOD BY AUTOMATED COUNT: 13.3 % (ref 12.3–15.4)
EXPIRATION DATE: ABNORMAL
GLOBULIN UR ELPH-MCNC: 3.2 GM/DL
GLUCOSE SERPL-MCNC: 98 MG/DL (ref 65–99)
HBA1C MFR BLD: 6.2 % (ref 4.5–5.7)
HCT VFR BLD AUTO: 36 % (ref 34–46.6)
HDLC SERPL-MCNC: 38 MG/DL (ref 40–60)
HGB BLD-MCNC: 11.7 G/DL (ref 12–15.9)
LDLC SERPL CALC-MCNC: 48 MG/DL (ref 0–100)
LDLC/HDLC SERPL: 1.34 {RATIO}
Lab: ABNORMAL
MCH RBC QN AUTO: 29.3 PG (ref 26.6–33)
MCHC RBC AUTO-ENTMCNC: 32.5 G/DL (ref 31.5–35.7)
MCV RBC AUTO: 90.2 FL (ref 79–97)
NT-PROBNP SERPL-MCNC: 1447 PG/ML (ref 0–1800)
NT-PROBNP SERPL-MCNC: 1450 PG/ML (ref 0–1800)
PLATELET # BLD AUTO: 424 10*3/MM3 (ref 140–450)
PMV BLD AUTO: 10.8 FL (ref 6–12)
POTASSIUM SERPL-SCNC: 5 MMOL/L (ref 3.5–5.2)
PROT SERPL-MCNC: 7 G/DL (ref 6–8.5)
RBC # BLD AUTO: 3.99 10*6/MM3 (ref 3.77–5.28)
SODIUM SERPL-SCNC: 145 MMOL/L (ref 136–145)
T4 FREE SERPL-MCNC: 1.53 NG/DL (ref 0.92–1.68)
TRIGL SERPL-MCNC: 40 MG/DL (ref 0–150)
TSH SERPL DL<=0.05 MIU/L-ACNC: 0.35 UIU/ML (ref 0.27–4.2)
VLDLC SERPL-MCNC: 11 MG/DL (ref 5–40)
WBC NRBC COR # BLD AUTO: 12.51 10*3/MM3 (ref 3.4–10.8)

## 2025-02-18 PROCEDURE — 3077F SYST BP >= 140 MM HG: CPT | Performed by: FAMILY MEDICINE

## 2025-02-18 PROCEDURE — 1126F AMNT PAIN NOTED NONE PRSNT: CPT | Performed by: FAMILY MEDICINE

## 2025-02-18 PROCEDURE — 85027 COMPLETE CBC AUTOMATED: CPT

## 2025-02-18 PROCEDURE — 82043 UR ALBUMIN QUANTITATIVE: CPT | Performed by: FAMILY MEDICINE

## 2025-02-18 PROCEDURE — 99214 OFFICE O/P EST MOD 30 MIN: CPT | Performed by: FAMILY MEDICINE

## 2025-02-18 PROCEDURE — 3044F HG A1C LEVEL LT 7.0%: CPT | Performed by: FAMILY MEDICINE

## 2025-02-18 PROCEDURE — 1159F MED LIST DOCD IN RCRD: CPT | Performed by: FAMILY MEDICINE

## 2025-02-18 PROCEDURE — G0439 PPPS, SUBSEQ VISIT: HCPCS | Performed by: FAMILY MEDICINE

## 2025-02-18 PROCEDURE — 83036 HEMOGLOBIN GLYCOSYLATED A1C: CPT | Performed by: FAMILY MEDICINE

## 2025-02-18 PROCEDURE — 36415 COLL VENOUS BLD VENIPUNCTURE: CPT

## 2025-02-18 PROCEDURE — 84439 ASSAY OF FREE THYROXINE: CPT

## 2025-02-18 PROCEDURE — 80061 LIPID PANEL: CPT

## 2025-02-18 PROCEDURE — 84443 ASSAY THYROID STIM HORMONE: CPT

## 2025-02-18 PROCEDURE — 3078F DIAST BP <80 MM HG: CPT | Performed by: FAMILY MEDICINE

## 2025-02-18 PROCEDURE — 80053 COMPREHEN METABOLIC PANEL: CPT

## 2025-02-18 PROCEDURE — 1160F RVW MEDS BY RX/DR IN RCRD: CPT | Performed by: FAMILY MEDICINE

## 2025-02-18 PROCEDURE — 83880 ASSAY OF NATRIURETIC PEPTIDE: CPT | Performed by: FAMILY MEDICINE

## 2025-02-18 PROCEDURE — 83880 ASSAY OF NATRIURETIC PEPTIDE: CPT

## 2025-02-18 PROCEDURE — 82570 ASSAY OF URINE CREATININE: CPT | Performed by: FAMILY MEDICINE

## 2025-02-18 RX ORDER — BROMPHENIRAMINE MALEATE, PSEUDOEPHEDRINE HYDROCHLORIDE, AND DEXTROMETHORPHAN HYDROBROMIDE 2; 30; 10 MG/5ML; MG/5ML; MG/5ML
5 SYRUP ORAL 4 TIMES DAILY PRN
Qty: 118 ML | Refills: 0 | Status: SHIPPED | OUTPATIENT
Start: 2025-02-18 | End: 2025-02-25

## 2025-02-18 NOTE — ASSESSMENT & PLAN NOTE
Orders:    CBC (No Diff); Future    Comprehensive metabolic panel; Future    Lipid panel; Future    TSH+Free T4; Future    BNP

## 2025-02-18 NOTE — PROGRESS NOTES
Subjective   The ABCs of the Annual Wellness Visit  Medicare Wellness Visit      Deisy Lama is a 74 y.o. patient who presents for a Medicare Wellness Visit.    The following portions of the patient's history were reviewed and   updated as appropriate: allergies, current medications, past family history, past medical history, past social history, past surgical history, and problem list.    Compared to one year ago, the patient's physical   health is the same.  Compared to one year ago, the patient's mental   health is the same.    Recent Hospitalizations:  She was not admitted to the hospital during the last year.     Current Medical Providers:  Patient Care Team:  Sanchez Valenzuela DO as PCP - General (Family Medicine)  Marya Reina MD as Consulting Physician (General Surgery)  Lauri Celis MD as Consulting Physician (General Surgery)    Outpatient Medications Prior to Visit   Medication Sig Dispense Refill    ammonium lactate (LAC-HYDRIN) 12 % lotion APPLY 1 GRAM TO THE SKIN EVERY DAY      atorvastatin (LIPITOR) 10 MG tablet Take 1 tablet by mouth Every Night.      Blood Glucose Monitoring Suppl (Accu-Chek Guide) w/Device kit Use 1 each Daily. 1 kit 0    brimonidine (ALPHAGAN) 0.2 % ophthalmic solution Administer 1 drop to both eyes 2 (Two) Times a Day.      carvedilol (COREG) 25 MG tablet Take 1 tablet by mouth 2 (Two) Times a Day. 180 tablet 3    dorzolamide (TRUSOPT) 2 % ophthalmic solution Administer 1 drop to both eyes 2 (Two) Times a Day.      dorzolamide-timolol (COSOPT) 2-0.5 % ophthalmic solution       escitalopram (LEXAPRO) 10 MG tablet Take 1 tablet by mouth Daily. 90 tablet 3    furosemide (LASIX) 20 MG tablet Take 2 tablets by mouth Every Other Day. 45 tablet 3    glucose blood test strip Use to check blood sugar 2 times a day 200 each 12    glucose monitor monitoring kit Use 1 each As Needed (CHECK BLOOD GLUCOSE DAILY TO MONITOR BLOOD GLUCOSE). 1 each 0    latanoprost (XALATAN) 0.005 %  ophthalmic solution       levothyroxine (SYNTHROID, LEVOTHROID) 125 MCG tablet Take 1 tablet by mouth Daily. 90 tablet 0    metOLazone (ZAROXOLYN) 2.5 MG tablet Take 1 tablet by mouth Every Other Day. 45 tablet 3    omeprazole (priLOSEC) 40 MG capsule Take 1 capsule by mouth Daily. 90 capsule 3    prednisoLONE acetate (PRED FORTE) 1 % ophthalmic suspension       rOPINIRole (REQUIP) 4 MG tablet Take 1 tablet by mouth 4 (Four) Times a Day. 4 Times Daily 360 tablet 3    spironolactone (ALDACTONE) 50 MG tablet Take 1 tablet by mouth Daily. 90 tablet 3    Tirzepatide (Mounjaro) 2.5 MG/0.5ML solution auto-injector INJECT 2.5MG (1 PEN) UNDER THE SKIN INTO THE APPROPRIATE AREA EVERY WEEK AS DIRECTED 2 mL 5    valsartan (DIOVAN) 320 MG tablet Take 1 tablet by mouth Daily. 90 tablet 3     No facility-administered medications prior to visit.     No opioid medication identified on active medication list. I have reviewed chart for other potential  high risk medication/s and harmful drug interactions in the elderly.      Aspirin is not on active medication list.  Aspirin use is not indicated based on review of current medical condition/s. Risk of harm outweighs potential benefits.  .    Patient Active Problem List   Diagnosis    Chronic systolic heart failure    Mixed hyperlipidemia    Essential hypertension    Presence of biventricular implantable cardioverter-defibrillator (ICD)    Lumbar degenerative disc disease    Lumbosacral spondylosis without myelopathy    Osteoarthritis of knee    Sebaceous cyst    Non-recurrent bilateral inguinal hernia without obstruction or gangrene    Chronic midline low back pain with left-sided sciatica    Hypothyroidism    Sciatica    Diabetic peripheral neuropathy    Degenerative scoliosis in adult patient    S/P spinal fusion    Lumbar foraminal stenosis    DM type 2 with diabetic dyslipidemia    Torticollis    Class 1 obesity due to excess calories with serious comorbidity and body mass index  "(BMI) of 30.0 to 30.9 in adult    Overweight (BMI 25.0-29.9)    CKD (chronic kidney disease) stage 4, GFR 15-29 ml/min    RLS (restless legs syndrome)    Chronic joint pain    Bladder prolapse, female, acquired    Tenosynovitis, de Quervain    Arthropathy of lumbar facet joint    Peripheral autonomic neuropathy in disorders classified elsewhere    Type 2 diabetes mellitus with hyperglycemia     Advance Care Planning Advance Directive is on file.  ACP discussion was declined by the patient. Patient has an advance directive in EMR which is still valid.             Objective   Vitals:    25 1154   BP: 161/56   BP Location: Left arm   Patient Position: Sitting   Cuff Size: Adult   Pulse: 53   Resp: 16   Temp: 98 °F (36.7 °C)   SpO2: 98%   Weight: 81.6 kg (180 lb)   Height: 162.6 cm (64\")   PainSc: 0-No pain       Estimated body mass index is 30.9 kg/m² as calculated from the following:    Height as of this encounter: 162.6 cm (64\").    Weight as of this encounter: 81.6 kg (180 lb).                Does the patient have evidence of cognitive impairment? No                                                                                                Health  Risk Assessment    Smoking Status:  Social History     Tobacco Use   Smoking Status Never    Passive exposure: Never   Smokeless Tobacco Never     Alcohol Consumption:  Social History     Substance and Sexual Activity   Alcohol Use Never       Fall Risk Screen  STEADI Fall Risk Assessment was completed, and patient is at MODERATE risk for falls. Assessment completed on:2025    Depression Screening   Little interest or pleasure in doing things? Not at all   Feeling down, depressed, or hopeless? Not at all   PHQ-2 Total Score 0      Health Habits and Functional and Cognitive Screenin/18/2025    11:00 AM   Functional & Cognitive Status   Do you have difficulty preparing food and eating? No   Do you have difficulty bathing yourself, getting dressed or " grooming yourself? No   Do you have difficulty using the toilet? No   Do you have difficulty moving around from place to place? Yes   Do you have trouble with steps or getting out of a bed or a chair? Yes   Current Diet Well Balanced Diet   Dental Exam Up to date   Eye Exam Up to date   Exercise (times per week) 0 times per week   Current Exercises Include No Regular Exercise   Do you need help using the phone?  No   Are you deaf or do you have serious difficulty hearing?  Yes   Do you need help to go to places out of walking distance? No   Do you need help shopping? No   Do you need help preparing meals?  No   Do you need help with housework?  No   Do you need help with laundry? No   Do you need help taking your medications? No   Do you need help managing money? No   Do you ever drive or ride in a car without wearing a seat belt? No   Have you felt unusual stress, anger or loneliness in the last month? Yes   Who do you live with? Spouse   If you need help, do you have trouble finding someone available to you? No   Have you been bothered in the last four weeks by sexual problems? No   Do you have difficulty concentrating, remembering or making decisions? Yes           Age-appropriate Screening Schedule:  Refer to the list below for future screening recommendations based on patient's age, sex and/or medical conditions. Orders for these recommended tests are listed in the plan section. The patient has been provided with a written plan.    Health Maintenance List  Health Maintenance   Topic Date Due    URINE MICROALBUMIN-CREATININE RATIO (uACR)  Never done    TDAP/TD VACCINES (1 - Tdap) Never done    HEPATITIS C SCREENING  Never done    COVID-19 Vaccine (7 - 2024-25 season) 09/01/2024    ANNUAL WELLNESS VISIT  01/09/2025    HEMOGLOBIN A1C  03/10/2025    RSV Vaccine - Adults (1 - 1-dose 75+ series) 03/05/2025 (Originally 4/30/2023)    DIABETIC EYE EXAM  05/02/2025    BMI FOLLOWUP  05/23/2025    LIPID PANEL  05/30/2025     DXA SCAN  06/21/2025    INFLUENZA VACCINE  Completed    Pneumococcal Vaccine 50+  Completed    ZOSTER VACCINE  Completed    COLORECTAL CANCER SCREENING  Discontinued                                                                                                                                                CMS Preventative Services Quick Reference  Risk Factors Identified During Encounter  reviewed    The above risks/problems have been discussed with the patient.  Pertinent information has been shared with the patient in the After Visit Summary.  An After Visit Summary and PPPS were made available to the patient.    Follow Up:   Next Medicare Wellness visit to be scheduled in 1 year.         Additional E&M Note during same encounter follows:  Patient has additional, significant, and separately identifiable condition(s)/problem(s) that require work above and beyond the Medicare Wellness Visit     Chief Complaint  Medicare Wellness-subsequent (Edema bilateral leg +3 pitting edema)    Subjective   HPI  Deisy is also being seen today for additional medical problem/s.          Patient presents to follow-up on chronic conditions including blood pressure was significantly elevated last month when seeing other providers last had labs 12/2024 are watching kidney function closely creatinine creeped up to 2.1 at that time likely from blood pressure elevations GFR under 30 CBC overall stable other than white blood cell count being slightly elevated.  Last A1c was 9/2024 was 6.0 doing quite well have been on and off medication including Mounjaro to help with both weight loss and diet management.  Was referred to nephrology saw on 2/6/2025 stage IIIb CKD contributed to chronic NSAID use diabetes hypertension increased her Lasix at that time recommended repeating labs and no longer taking NSAIDs        Objective   Vital Signs:  /56 (BP Location: Left arm, Patient Position: Sitting, Cuff Size: Adult)   Pulse 53   Temp  "98 °F (36.7 °C)   Resp 16   Ht 162.6 cm (64\")   Wt 81.6 kg (180 lb)   SpO2 98%   BMI 30.90 kg/m²   Physical Exam  Vitals reviewed.   Constitutional:       Appearance: Normal appearance. She is well-developed.   HENT:      Head: Normocephalic and atraumatic.      Right Ear: External ear normal.      Left Ear: External ear normal.      Nose: Nose normal.   Eyes:      Conjunctiva/sclera: Conjunctivae normal.      Pupils: Pupils are equal, round, and reactive to light.   Cardiovascular:      Rate and Rhythm: Normal rate.   Pulmonary:      Effort: Pulmonary effort is normal.      Breath sounds: Normal breath sounds.   Abdominal:      General: There is no distension.   Musculoskeletal:      Right lower leg: Edema present.      Left lower leg: Edema present.      Comments: Swelling pooling around ankles bilaterally not significantly improved, painful    Skin:     General: Skin is warm and dry.   Neurological:      Mental Status: She is alert and oriented to person, place, and time. Mental status is at baseline.      Motor: Weakness present.   Psychiatric:         Mood and Affect: Mood and affect normal.         Behavior: Behavior normal.         Thought Content: Thought content normal.         Judgment: Judgment normal.                       Assessment and Plan            Type 2 diabetes mellitus with hyperglycemia, unspecified whether long term insulin use      Orders:    POC Glycosylated Hemoglobin (Hb A1C)    Microalbumin / Creatinine Urine Ratio - Urine, Clean Catch    CBC (No Diff); Future    Comprehensive metabolic panel; Future    Lipid panel; Future    TSH+Free T4; Future    BNP    Hypothyroidism, unspecified type    Orders:    CBC (No Diff); Future    Comprehensive metabolic panel; Future    Lipid panel; Future    TSH+Free T4; Future    BNP    Essential hypertension      Orders:    CBC (No Diff); Future    Comprehensive metabolic panel; Future    Lipid panel; Future    TSH+Free T4; Future    BNP    CKD " (chronic kidney disease) stage 4, GFR 15-29 ml/min      Orders:    CBC (No Diff); Future    Comprehensive metabolic panel; Future    Lipid panel; Future    TSH+Free T4; Future    BNP    Shortness of breath    Orders:    BNP         Reviewed AWV recommendations and ordered as appropriate, follow up annually for review, sooner if concerns    No depression  Fall risk discussed  Reviewed dementia and memory issues if present and will monitor if apparent or changes over time, sooner if needed  Risk and home safety assessment reviewed, will refer to home health or order services or assistance needed if indicated     Followed up on chronic conditions with specialist as scheduled, assistance at home with care needs and referrals if needed for further or additional assistance discussed     Reviewed medicines and ordered refills if appropriate, monitoring labs and most recent labs reviewed discussed when to recheck or ordered today if needed     Follow up every 3-6 months for chronic conditions or sooner for more closely monitor concerns as discussed today       Will order labs to repeat check kidney function as well as a BNP to further evaluate leg swelling source, make sure no heart failure or worsening cardiac component    Increasing memory concerns for patient with hypertension and other chronic comorbidities, will order CT head to evaluate for any changes stroke or other    Discussed medication for blood pressure swelling edema will change or adjust based on results and patient can continue to follow-up with nephrology additionally to help    Goal less than at least 130-140/90 today elevated 161/56    Close follow-up in the next week or so sooner if indicated       Follow Up   No follow-ups on file.  Patient was given instructions and counseling regarding her condition or for health maintenance advice. Please see specific information pulled into the AVS if appropriate.

## 2025-02-18 NOTE — ASSESSMENT & PLAN NOTE
Orders:    POC Glycosylated Hemoglobin (Hb A1C)    Microalbumin / Creatinine Urine Ratio - Urine, Clean Catch    CBC (No Diff); Future    Comprehensive metabolic panel; Future    Lipid panel; Future    TSH+Free T4; Future    BNP

## 2025-02-19 LAB
ALBUMIN UR-MCNC: 2.7 MG/DL
CREAT UR-MCNC: 91.6 MG/DL
MICROALBUMIN/CREAT UR: 29.5 MG/G (ref 0–29)

## 2025-02-25 ENCOUNTER — OFFICE VISIT (OUTPATIENT)
Dept: FAMILY MEDICINE CLINIC | Facility: CLINIC | Age: 75
End: 2025-02-25
Payer: MEDICARE

## 2025-02-25 VITALS
HEART RATE: 97 BPM | SYSTOLIC BLOOD PRESSURE: 142 MMHG | DIASTOLIC BLOOD PRESSURE: 72 MMHG | BODY MASS INDEX: 31.04 KG/M2 | WEIGHT: 181.8 LBS | HEIGHT: 64 IN | RESPIRATION RATE: 16 BRPM | TEMPERATURE: 97.8 F | OXYGEN SATURATION: 94 %

## 2025-02-25 DIAGNOSIS — I10 ESSENTIAL HYPERTENSION: Chronic | ICD-10-CM

## 2025-02-25 DIAGNOSIS — G25.81 RLS (RESTLESS LEGS SYNDROME): ICD-10-CM

## 2025-02-25 DIAGNOSIS — R60.0 BILATERAL LEG EDEMA: Primary | Chronic | ICD-10-CM

## 2025-02-25 DIAGNOSIS — E11.65 TYPE 2 DIABETES MELLITUS WITH HYPERGLYCEMIA, UNSPECIFIED WHETHER LONG TERM INSULIN USE: Chronic | ICD-10-CM

## 2025-02-25 DIAGNOSIS — N18.4 CKD (CHRONIC KIDNEY DISEASE) STAGE 4, GFR 15-29 ML/MIN: Chronic | ICD-10-CM

## 2025-02-25 DIAGNOSIS — M25.50 CHRONIC JOINT PAIN: ICD-10-CM

## 2025-02-25 DIAGNOSIS — E03.9 HYPOTHYROIDISM, UNSPECIFIED TYPE: Chronic | ICD-10-CM

## 2025-02-25 DIAGNOSIS — G89.29 CHRONIC JOINT PAIN: ICD-10-CM

## 2025-02-25 DIAGNOSIS — I50.22 CHRONIC HFREF (HEART FAILURE WITH REDUCED EJECTION FRACTION): Chronic | ICD-10-CM

## 2025-02-25 PROCEDURE — 3077F SYST BP >= 140 MM HG: CPT | Performed by: FAMILY MEDICINE

## 2025-02-25 PROCEDURE — 1159F MED LIST DOCD IN RCRD: CPT | Performed by: FAMILY MEDICINE

## 2025-02-25 PROCEDURE — 3078F DIAST BP <80 MM HG: CPT | Performed by: FAMILY MEDICINE

## 2025-02-25 PROCEDURE — 1160F RVW MEDS BY RX/DR IN RCRD: CPT | Performed by: FAMILY MEDICINE

## 2025-02-25 PROCEDURE — 99214 OFFICE O/P EST MOD 30 MIN: CPT | Performed by: FAMILY MEDICINE

## 2025-02-25 PROCEDURE — 1126F AMNT PAIN NOTED NONE PRSNT: CPT | Performed by: FAMILY MEDICINE

## 2025-02-25 RX ORDER — BUMETANIDE 2 MG/1
2 TABLET ORAL 2 TIMES DAILY
Qty: 20 TABLET | Refills: 0 | Status: SHIPPED | OUTPATIENT
Start: 2025-02-25 | End: 2025-03-06

## 2025-02-25 RX ORDER — HYDROCODONE BITARTRATE AND ACETAMINOPHEN 7.5; 325 MG/1; MG/1
1 TABLET ORAL EVERY 6 HOURS PRN
Qty: 12 TABLET | Refills: 0 | Status: SHIPPED | OUTPATIENT
Start: 2025-02-25

## 2025-03-03 NOTE — PROGRESS NOTES
"Chief Complaint  Edema (+4 swelling bilateral legs. /Pt complains of SOB on exertion.  ) and Fall (Left wrist/shoulder pain. )    Subjective          Deisy Lama presents to [facility]  Fall      Patient presents with swelling in her legs bilaterally complaining of shortness of breath on exertion     Patient also having recent fall, has recently heart left shoulder wrist from fall was a small tear of skin forearm, sore from fall        Objective   Vital Signs:   /72 (BP Location: Right arm, Patient Position: Sitting, Cuff Size: Adult)   Pulse 97   Temp 97.8 °F (36.6 °C)   Resp 16   Ht 162.6 cm (64\")   Wt 82.5 kg (181 lb 12.8 oz)   SpO2 94%   BMI 31.21 kg/m²            Physical Exam  Vitals reviewed.   Constitutional:       Appearance: Normal appearance. She is well-developed.   HENT:      Head: Normocephalic and atraumatic.      Right Ear: External ear normal.      Left Ear: External ear normal.      Nose: Nose normal.   Eyes:      Conjunctiva/sclera: Conjunctivae normal.      Pupils: Pupils are equal, round, and reactive to light.   Cardiovascular:      Rate and Rhythm: Normal rate.   Pulmonary:      Effort: Pulmonary effort is normal.      Breath sounds: Normal breath sounds.   Abdominal:      General: There is no distension.   Musculoskeletal:      Right lower leg: Edema present.      Left lower leg: Edema present.   Skin:     General: Skin is warm and dry.      Comments: Skin tear lesion from fall left forearm   Neurological:      Mental Status: She is alert and oriented to person, place, and time. Mental status is at baseline.   Psychiatric:         Mood and Affect: Mood and affect normal.         Behavior: Behavior normal.         Thought Content: Thought content normal.         Judgment: Judgment normal.            Result Review :   The following data was reviewed by: Sanchez Valenzuela DO on 02/25/2025:  Common labs          11/13/2024    09:39 12/20/2024    " 15:27 2/18/2025    12:02 2/18/2025    12:24 2/18/2025    12:26   Common Labs   Glucose 115  119    98    BUN 40  38    43    Creatinine 1.44  2.10    2.04    Sodium 139  143    145    Potassium 4.4  4.6    5.0    Chloride 110  108    112    Calcium 9.8  9.6    9.3    Albumin 3.3  3.9    3.8    Total Bilirubin     0.3    Alkaline Phosphatase     112    AST (SGOT)     18    ALT (SGPT)     14    WBC  11.99    12.51    Hemoglobin  12.2    11.7    Hematocrit  38.4    36.0    Platelets  478    424    Total Cholesterol     97    Triglycerides     40    HDL Cholesterol     38    LDL Cholesterol      48    Hemoglobin A1C   6.2      Microalbumin, Urine    2.7     Uric Acid  6.6                       Assessment and Plan    Diagnoses and all orders for this visit:    1. Bilateral leg edema (Primary)  -     Ambulatory Referral to Home Health    2. Chronic joint pain  -     HYDROcodone-acetaminophen (NORCO) 7.5-325 MG per tablet; Take 1 tablet by mouth Every 6 (Six) Hours As Needed for Moderate Pain.  Dispense: 12 tablet; Refill: 0    3. Type 2 diabetes mellitus with hyperglycemia, unspecified whether long term insulin use  -     Ambulatory Referral to Home Health    4. CKD (chronic kidney disease) stage 4, GFR 15-29 ml/min  -     Ambulatory Referral to Home Health    5. Hypothyroidism, unspecified type    6. Essential hypertension    7. RLS (restless legs syndrome)    8. Chronic HFrEF nonischemic  -     Renal Function Panel; Future  -     BNP; Future  -     Ambulatory Referral to Home Health    Other orders  -     bumetanide (BUMEX) 2 MG tablet; Take 1 tablet by mouth 2 (Two) Times a Day.  Dispense: 20 tablet; Refill: 0      Patient presents with bilateral leg edema we will prescribe Bumex to help improve monitor fluid intake referral to home health additionally to help manage and monitor medication adherence at home discontinue metolazone and Lasix at this poin leg edema will need \elevation and consider Unna boots or similar  to help improve additionally close follow-up with patient    X-rays if indicated for recent fall dressed and clean wound from fall laceration no signs of infection or other    Pain medicine prescribed for fall and pain to take sparingly as needed JOE reviewed          Follow Up   Return in about 1 week (around 3/4/2025), or if symptoms worsen or fail to improve, for Next scheduled follow up, Labs before.  Patient was given instructions and counseling regarding her condition or for health maintenance advice. Please see specific information pulled into the AVS if appropriate.     Transcribed from ambient dictation for Sanchez Valenzuela DO by Sanchez Valenzuela DO.  03/03/25   15:00 EST

## 2025-03-05 ENCOUNTER — HOSPITAL ENCOUNTER (OUTPATIENT)
Dept: CT IMAGING | Facility: HOSPITAL | Age: 75
Discharge: HOME | End: 2025-03-05
Admitting: FAMILY MEDICINE
Payer: MEDICARE

## 2025-03-05 DIAGNOSIS — R41.3 MEMORY LOSS: ICD-10-CM

## 2025-03-05 PROCEDURE — 70450 CT HEAD/BRAIN W/O DYE: CPT

## 2025-03-06 RX ORDER — BUMETANIDE 2 MG/1
2 TABLET ORAL 2 TIMES DAILY
Qty: 20 TABLET | Refills: 0 | Status: SHIPPED | OUTPATIENT
Start: 2025-03-06

## 2025-03-07 ENCOUNTER — TELEPHONE (OUTPATIENT)
Dept: FAMILY MEDICINE CLINIC | Facility: CLINIC | Age: 75
End: 2025-03-07
Payer: MEDICARE

## 2025-03-07 NOTE — TELEPHONE ENCOUNTER
Caller: Deisy Lama    Relationship: Self    Best call back number: [phone]     What is the best time to reach you: ANYTIME     Who are you requesting to speak with (clinical staff, provider,  specific staff member): CLINIC OR PROVIDER       What was the call regarding: PATIENT CALLED IN STATING DO TAYLER PRESCRIBED HER A NEW WATER PILL     bumetanide (BUMEX) 2 MG tablet     AND STATES DO TAYLER WROTE THE PRESCRIPTION SAYING TAKE FOR 10 DAYS. PATIENT IS CONFUSED BECAUSE THE PHARMACY GAVE HER ANOTHER REFILL AND PATIENT WOULD LIKE TO KNOW IF SHE IS SUPPOSED TO CONTINUE TO TAKE THIS MEDICATION OR NOT.    PLEASE CALL PATIENT BACK AS SOON AS POSSIBLE FOR CLARIFICATION.

## 2025-03-10 ENCOUNTER — TELEPHONE (OUTPATIENT)
Dept: FAMILY MEDICINE CLINIC | Facility: CLINIC | Age: 75
End: 2025-03-10

## 2025-03-10 NOTE — TELEPHONE ENCOUNTER
Caller: Deisy Lama    Relationship: Self    Best call back number:   Telephone Information:   Mobile [phone]        What is the best time to reach you: ANYTIME  LEAVE VOICEMAIL IF UNABLE TO REACH    Who are you requesting to speak with (clinical staff, provider,  specific staff member): CLINICAL     What was the call regarding: CALLING OVER WATER PILLS AND HAVING 10 DAYS EXTRA SUPPLY REQUESTING TO SPEAK WITH NURSE OVER THIS    REQUESTING CT RESULTS

## 2025-03-11 ENCOUNTER — LAB (OUTPATIENT)
Dept: LAB | Facility: HOSPITAL | Age: 75
End: 2025-03-11
Payer: MEDICARE

## 2025-03-11 DIAGNOSIS — I50.22 CHRONIC HFREF (HEART FAILURE WITH REDUCED EJECTION FRACTION): Chronic | ICD-10-CM

## 2025-03-11 DIAGNOSIS — R60.0 BILATERAL LEG EDEMA: Primary | ICD-10-CM

## 2025-03-11 LAB
ALBUMIN SERPL-MCNC: 3.9 G/DL (ref 3.5–5.2)
ANION GAP SERPL CALCULATED.3IONS-SCNC: 9 MMOL/L (ref 5–15)
BUN SERPL-MCNC: 58 MG/DL (ref 8–23)
BUN/CREAT SERPL: 21.3 (ref 7–25)
CALCIUM SPEC-SCNC: 9.3 MG/DL (ref 8.6–10.5)
CHLORIDE SERPL-SCNC: 108 MMOL/L (ref 98–107)
CO2 SERPL-SCNC: 24 MMOL/L (ref 22–29)
CREAT SERPL-MCNC: 2.72 MG/DL (ref 0.57–1)
EGFRCR SERPLBLD CKD-EPI 2021: 17.6 ML/MIN/1.73
GLUCOSE SERPL-MCNC: 115 MG/DL (ref 65–99)
NT-PROBNP SERPL-MCNC: 795 PG/ML (ref 0–1800)
PHOSPHATE SERPL-MCNC: 5.3 MG/DL (ref 2.5–4.5)
POTASSIUM SERPL-SCNC: 4.9 MMOL/L (ref 3.5–5.2)
SODIUM SERPL-SCNC: 141 MMOL/L (ref 136–145)

## 2025-03-11 PROCEDURE — 80069 RENAL FUNCTION PANEL: CPT

## 2025-03-11 PROCEDURE — 83880 ASSAY OF NATRIURETIC PEPTIDE: CPT

## 2025-03-11 PROCEDURE — 36415 COLL VENOUS BLD VENIPUNCTURE: CPT

## 2025-03-11 RX ORDER — BUMETANIDE 2 MG/1
2 TABLET ORAL DAILY
Qty: 30 TABLET | Refills: 5 | Status: SHIPPED | OUTPATIENT
Start: 2025-03-11

## 2025-03-12 DIAGNOSIS — E03.9 HYPOTHYROIDISM, UNSPECIFIED TYPE: ICD-10-CM

## 2025-03-12 RX ORDER — LEVOTHYROXINE SODIUM 125 UG/1
125 TABLET ORAL DAILY
Qty: 90 TABLET | Refills: 0 | Status: SHIPPED | OUTPATIENT
Start: 2025-03-12

## 2025-03-13 DIAGNOSIS — R60.0 BILATERAL LEG EDEMA: ICD-10-CM

## 2025-03-13 RX ORDER — BUMETANIDE 2 MG/1
2 TABLET ORAL 2 TIMES DAILY
Qty: 20 TABLET | OUTPATIENT
Start: 2025-03-13

## 2025-03-13 RX ORDER — ATORVASTATIN CALCIUM 10 MG/1
10 TABLET, FILM COATED ORAL NIGHTLY
Qty: 90 TABLET | Refills: 3 | Status: SHIPPED | OUTPATIENT
Start: 2025-03-13

## 2025-03-13 NOTE — TELEPHONE ENCOUNTER
Patient requesting refill, last filled by historical provider.  Please advise if this is appropriate for patient.

## 2025-03-13 NOTE — TELEPHONE ENCOUNTER
The [facility] received a fax that requires your attention. The document has been indexed to the patient’s chart for your review.      Reason for sending: EXTERNAL MEDICAL RECORD NOTIFICATION     Documents Description: VALSARTAN/CARVEDILOL NEW RX REQ-[facility]-3.12.25    Name of Sender: [facility]     Date Indexed: 3.12.25

## 2025-03-14 RX ORDER — CARVEDILOL 25 MG/1
25 TABLET ORAL 2 TIMES DAILY
Qty: 180 TABLET | Refills: 3 | Status: SHIPPED | OUTPATIENT
Start: 2025-03-14

## 2025-03-14 RX ORDER — VALSARTAN 320 MG/1
320 TABLET ORAL DAILY
Qty: 90 TABLET | Refills: 3 | OUTPATIENT
Start: 2025-03-14

## 2025-03-14 RX ORDER — VALSARTAN 320 MG/1
320 TABLET ORAL DAILY
Qty: 90 TABLET | Refills: 0 | Status: SHIPPED | OUTPATIENT
Start: 2025-03-14

## 2025-03-27 ENCOUNTER — TRANSCRIBE ORDERS (OUTPATIENT)
Age: 75
End: 2025-03-27
Payer: MEDICARE

## 2025-03-27 DIAGNOSIS — I71.43 INFRARENAL ABDOMINAL AORTIC ANEURYSM (AAA) WITHOUT RUPTURE: Primary | ICD-10-CM

## 2025-04-07 ENCOUNTER — CLINICAL SUPPORT NO REQUIREMENTS (OUTPATIENT)
Dept: CARDIOLOGY | Facility: CLINIC | Age: 75
End: 2025-04-07
Payer: MEDICARE

## 2025-04-07 ENCOUNTER — OFFICE VISIT (OUTPATIENT)
Dept: CARDIOLOGY | Facility: CLINIC | Age: 75
End: 2025-04-07
Payer: MEDICARE

## 2025-04-07 VITALS
DIASTOLIC BLOOD PRESSURE: 62 MMHG | WEIGHT: 165 LBS | BODY MASS INDEX: 28.17 KG/M2 | SYSTOLIC BLOOD PRESSURE: 106 MMHG | HEIGHT: 64 IN | HEART RATE: 59 BPM

## 2025-04-07 DIAGNOSIS — I44.2 CHB (COMPLETE HEART BLOCK): ICD-10-CM

## 2025-04-07 DIAGNOSIS — I50.22 CHRONIC HFREF (HEART FAILURE WITH REDUCED EJECTION FRACTION): Primary | ICD-10-CM

## 2025-04-07 DIAGNOSIS — Z95.810 PRESENCE OF BIVENTRICULAR IMPLANTABLE CARDIOVERTER-DEFIBRILLATOR (ICD): ICD-10-CM

## 2025-04-07 DIAGNOSIS — I10 ESSENTIAL HYPERTENSION: ICD-10-CM

## 2025-04-07 PROCEDURE — 3074F SYST BP LT 130 MM HG: CPT | Performed by: INTERNAL MEDICINE

## 2025-04-07 PROCEDURE — 93284 PRGRMG EVAL IMPLANTABLE DFB: CPT | Performed by: INTERNAL MEDICINE

## 2025-04-07 PROCEDURE — 99214 OFFICE O/P EST MOD 30 MIN: CPT | Performed by: INTERNAL MEDICINE

## 2025-04-07 PROCEDURE — G2211 COMPLEX E/M VISIT ADD ON: HCPCS | Performed by: INTERNAL MEDICINE

## 2025-04-07 PROCEDURE — 3078F DIAST BP <80 MM HG: CPT | Performed by: INTERNAL MEDICINE

## 2025-04-07 NOTE — ASSESSMENT & PLAN NOTE
Device working normally persistently elevated threshold in the left ventricle device parameters were turned down to promote battery life

## 2025-04-07 NOTE — PROGRESS NOTES
Normal BI-V ICD Device Interrogation and Device Testing.  Normal evaluation of device function and lead measurements.  No optimization was needed of parameters or maximization of device longevity.  Patient is on automated Home Remote Monitoring. Decreased Right Atrium and Left Ventricular voltage to conserve on battery life.

## 2025-04-07 NOTE — PROGRESS NOTES
Chief Complaint  Congestive Heart Failure, Hypertension, Hyperlipidemia, and Follow-up (Device check)    Subjective    Patient following up today she does report lower extremity edema problems as well as shortness of breath with exertion symptoms.  Her weight is down about 16 pounds from her last visit.  She does not report any chest discomfort issues  Past Medical History:   Diagnosis Date    AAA (abdominal aortic aneurysm)     Pt reported/BEING MONITORED    Acid reflux     Arthritis     Blood disease     ABNORMAL PLATELET COUNT/CHRONIC ELEVATED WBC,NO LEUKEMIA    CHB (complete heart block)     PACEMAKER    Congestive heart failure (CHF)     MANAGED W/DIURETIC, NO RECENT PROBLEMS    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    Diabetes mellitus     AVERAGE AM BS 'S    Glaucoma     Hiatal hernia     History of transfusion     LAST 2023 REPORTS NO TRANSFUSION REACTION POST SPINAL FUSION    Hyperlipidemia LDL goal <100 08/23/2021    Hypertension     Kidney stone     NO CURRENT ISSUES    Presence of biventricular implantable cardioverter-defibrillator (ICD) 08/23/2021    Sciatica     LEFT    Sleep apnea     Thyroid disorder     hypo    Wound of back     REPORTS HAS NOT HEALED SINCE BACK SURGERY AND IS FOLLOWED BY WOUND CARE         Current Outpatient Medications:     ammonium lactate (LAC-HYDRIN) 12 % lotion, APPLY 1 GRAM TO THE SKIN EVERY DAY, Disp: , Rfl:     atorvastatin (LIPITOR) 10 MG tablet, Take 1 tablet by mouth Every Night., Disp: 90 tablet, Rfl: 3    Blood Glucose Monitoring Suppl (Accu-Chek Guide) w/Device kit, Use 1 each Daily., Disp: 1 kit, Rfl: 0    brimonidine (ALPHAGAN) 0.2 % ophthalmic solution, Administer 1 drop to both eyes 2 (Two) Times a Day., Disp: , Rfl:     bumetanide (BUMEX) 2 MG tablet, Take 1 tablet by mouth Daily., Disp: 30 tablet, Rfl: 5    carvedilol (COREG) 25 MG tablet, Take 1 tablet by mouth 2 (Two) Times a Day., Disp: 180 tablet, Rfl: 3    dorzolamide (TRUSOPT) 2 % ophthalmic solution,  Administer 1 drop to both eyes 2 (Two) Times a Day., Disp: , Rfl:     dorzolamide-timolol (COSOPT) 2-0.5 % ophthalmic solution, , Disp: , Rfl:     escitalopram (LEXAPRO) 10 MG tablet, Take 1 tablet by mouth Daily., Disp: 90 tablet, Rfl: 3    glucose blood test strip, Use to check blood sugar 2 times a day, Disp: 200 each, Rfl: 12    glucose monitor monitoring kit, Use 1 each As Needed (CHECK BLOOD GLUCOSE DAILY TO MONITOR BLOOD GLUCOSE)., Disp: 1 each, Rfl: 0    HYDROcodone-acetaminophen (NORCO) 7.5-325 MG per tablet, Take 1 tablet by mouth Every 6 (Six) Hours As Needed for Moderate Pain., Disp: 12 tablet, Rfl: 0    latanoprost (XALATAN) 0.005 % ophthalmic solution, , Disp: , Rfl:     levothyroxine (SYNTHROID, LEVOTHROID) 125 MCG tablet, Take 1 tablet by mouth Daily., Disp: 90 tablet, Rfl: 0    omeprazole (priLOSEC) 40 MG capsule, Take 1 capsule by mouth Daily., Disp: 90 capsule, Rfl: 3    prednisoLONE acetate (PRED FORTE) 1 % ophthalmic suspension, , Disp: , Rfl:     rOPINIRole (REQUIP) 4 MG tablet, Take 1 tablet by mouth 4 (Four) Times a Day. 4 Times Daily, Disp: 360 tablet, Rfl: 3    spironolactone (ALDACTONE) 50 MG tablet, Take 1 tablet by mouth Daily., Disp: 90 tablet, Rfl: 3    valsartan (DIOVAN) 320 MG tablet, Take 1 tablet by mouth Daily., Disp: 90 tablet, Rfl: 0    There are no discontinued medications.  Allergies   Allergen Reactions    Morphine Itching    Dilaudid [Hydromorphone] Confusion    Latex Itching    Lisinopril Cough        Social History     Tobacco Use    Smoking status: Never     Passive exposure: Never    Smokeless tobacco: Never   Vaping Use    Vaping status: Never Used   Substance Use Topics    Alcohol use: Never    Drug use: Never       Family History   Problem Relation Age of Onset    Stroke Mother     Diabetes Mother     Arthritis Mother     Osteoporosis Mother     Heart disease Father     Cancer Father         PROSTATE    Arthritis Father     Osteoporosis Father     Diabetes Brother      "Cancer Son     Rosalinda Hyperthermia Neg Hx         Objective     /62 (BP Location: Right arm)   Pulse 59   Ht 162.6 cm (64\")   Wt 74.8 kg (165 lb)   BMI 28.32 kg/m²       Physical Exam    General Appearance:   no acute distress  Alert and oriented x3  HENT:   lips not cyanotic  Atraumatic  Neck:  No jvd   supple  Respiratory:  no respiratory distress  normal breath sounds  no rales  Cardiovascular:  Regular rate and rhythm  no S3, no S4   no murmur  no rub  Her incision site is healed she does have an area where it looks like the edge of the device and/or part of the wire protrudes but no erythema or evidence of imminent skin breakdown  Extremities  No cyanosis  lower extremity edema: none    Skin:   warm, dry  No rashes      Result Review :     proBNP   Date Value Ref Range Status   03/11/2025 795.0 0.0 - 1,800.0 pg/mL Final     CMP          12/20/2024    15:27 2/18/2025    12:26 3/11/2025    13:25   CMP   Glucose 119  98  115    BUN 38  43  58    Creatinine 2.10  2.04  2.72    EGFR 24.0  24.9  17.6    Sodium 143  145  141    Potassium 4.6  5.0  4.9    Chloride 108  112  108    Calcium 9.6  9.3  9.3    Total Protein  7.0     Albumin 3.9  3.8  3.9    Globulin  3.2     Total Bilirubin  0.3     Alkaline Phosphatase  112     AST (SGOT)  18     ALT (SGPT)  14     Albumin/Globulin Ratio  1.2     BUN/Creatinine Ratio 18.1  21.1  21.3    Anion Gap 9.7  10.2  9.0      CBC w/diff          11/4/2024    10:03 12/20/2024    15:27 2/18/2025    12:26   CBC w/Diff   WBC 10.26  11.99  12.51    RBC 4.30  4.23  3.99    Hemoglobin 12.6  12.2  11.7    Hematocrit 38.5  38.4  36.0    MCV 89.5  90.8  90.2    MCH 29.3  28.8  29.3    MCHC 32.7  31.8  32.5    RDW 12.1  12.7  13.3    Platelets 510  478  424    Neutrophil Rel %  61.5     Immature Granulocyte Rel %  0.3     Lymphocyte Rel %  22.8     Monocyte Rel %  10.6     Eosinophil Rel %  3.8     Basophil Rel %  1.0        Lab Results   Component Value Date    TSH 0.354 02/18/2025 " "     Lab Results   Component Value Date    FREET4 1.53 02/18/2025      No results found for: \"DDIMERQUANT\"  Magnesium   Date Value Ref Range Status   05/30/2024 2.8 (H) 1.6 - 2.4 mg/dL Final      No results found for: \"DIGOXIN\"   Lab Results   Component Value Date    TROPONINT 39 (H) 10/28/2023           Lipid Panel          5/30/2024    11:03 2/18/2025    12:26   Lipid Panel   Total Cholesterol 119  97    Triglycerides 99  40    HDL Cholesterol 30  38    VLDL Cholesterol 19  11    LDL Cholesterol  70  48    LDL/HDL Ratio 2.31  1.34      No results found for: \"POCTROP\"    Results for orders placed during the hospital encounter of 11/27/23    Adult Transthoracic Echo Complete W/ Cont if Necessary Per Protocol    Interpretation Summary    The study is technically adequate for diagnosis.    Left ventricular systolic function is normal. Calculated left ventricular EF = 58.2%    Left ventricular wall thickness is consistent with mild concentric hypertrophy.    Left ventricular diastolic function is consistent with (grade Ia w/high LAP) impaired relaxation.    The left atrial cavity is mildly dilated.    Mild aortic valve stenosis is present.      BiV ICD interrogated found to be working normally the right atrial lead was paced 100 %.  The right ventricular was paced 100 present time.  The left ventricular he was paced on a percent of time.  The right atrial and left ventricular lead thresholds were turned down to promote battery life          Diagnoses and all orders for this visit:    1. Chronic HFrEF (heart failure with reduced ejection fraction) (Primary)  Assessment & Plan:  With persistent lower extremity edema issues and complaints however her weight is down and her creatinine function on her last renal panel was elevated so do not favor more intensive diuresis at this point she is currently being followed by nephrology as well.  Continue with spironolactone at 50 daily, valsartan 320 daily, and Coreg 25 twice " daily dosing she is on Bumex 2 mg for volume maintenance and a potassium level stable      2. Essential hypertension  Assessment & Plan:  Blood pressure is doing better stable currently on carvedilol 25 twice daily, valsartan 320 daily, and Aldactone 50 daily does not       3. Presence of biventricular implantable cardioverter-defibrillator (ICD)  Assessment & Plan:  Device working normally persistently elevated threshold in the left ventricle device parameters were turned down to promote battery life              Follow Up     Return in about 6 months (around 10/7/2025) for Follow with Shefali Chavez.          Patient was given instructions and counseling regarding her condition or for health maintenance advice. Please see specific information pulled into the AVS if appropriate.

## 2025-04-07 NOTE — ASSESSMENT & PLAN NOTE
With persistent lower extremity edema issues and complaints however her weight is down and her creatinine function on her last renal panel was elevated so do not favor more intensive diuresis at this point she is currently being followed by nephrology as well.  Continue with spironolactone at 50 daily, valsartan 320 daily, and Coreg 25 twice daily dosing she is on Bumex 2 mg for volume maintenance and a potassium level stable

## 2025-04-07 NOTE — ASSESSMENT & PLAN NOTE
Blood pressure is doing better stable currently on carvedilol 25 twice daily, valsartan 320 daily, and Aldactone 50 daily does not

## 2025-04-10 ENCOUNTER — TRANSCRIBE ORDERS (OUTPATIENT)
Dept: LAB | Facility: HOSPITAL | Age: 75
End: 2025-04-10
Payer: MEDICARE

## 2025-04-10 ENCOUNTER — LAB (OUTPATIENT)
Dept: LAB | Facility: HOSPITAL | Age: 75
End: 2025-04-10
Payer: MEDICARE

## 2025-04-10 ENCOUNTER — OFFICE VISIT (OUTPATIENT)
Dept: FAMILY MEDICINE CLINIC | Facility: CLINIC | Age: 75
End: 2025-04-10
Payer: MEDICARE

## 2025-04-10 VITALS
DIASTOLIC BLOOD PRESSURE: 60 MMHG | HEART RATE: 60 BPM | HEIGHT: 64 IN | BODY MASS INDEX: 28.51 KG/M2 | SYSTOLIC BLOOD PRESSURE: 109 MMHG | WEIGHT: 167 LBS | OXYGEN SATURATION: 96 % | RESPIRATION RATE: 16 BRPM | TEMPERATURE: 97.6 F

## 2025-04-10 DIAGNOSIS — N18.4 CKD (CHRONIC KIDNEY DISEASE) STAGE 4, GFR 15-29 ML/MIN: ICD-10-CM

## 2025-04-10 DIAGNOSIS — I10 ESSENTIAL HYPERTENSION, MALIGNANT: ICD-10-CM

## 2025-04-10 DIAGNOSIS — E11.49 DIABETIC NEUROPATHY WITH NEUROLOGIC COMPLICATION: ICD-10-CM

## 2025-04-10 DIAGNOSIS — N18.4 CKD (CHRONIC KIDNEY DISEASE) STAGE 4, GFR 15-29 ML/MIN: Chronic | ICD-10-CM

## 2025-04-10 DIAGNOSIS — E11.8 DIABETIC COMPLICATION: ICD-10-CM

## 2025-04-10 DIAGNOSIS — E03.9 HYPOTHYROIDISM, UNSPECIFIED TYPE: ICD-10-CM

## 2025-04-10 DIAGNOSIS — I50.22 CHRONIC SYSTOLIC HEART FAILURE: ICD-10-CM

## 2025-04-10 DIAGNOSIS — E11.65 TYPE 2 DIABETES MELLITUS WITH HYPERGLYCEMIA, UNSPECIFIED WHETHER LONG TERM INSULIN USE: ICD-10-CM

## 2025-04-10 DIAGNOSIS — N18.32 CHRONIC KIDNEY DISEASE (CKD) STAGE G3B/A1, MODERATELY DECREASED GLOMERULAR FILTRATION RATE (GFR) BETWEEN 30-44 ML/MIN/1.73 SQUARE METER AND ALBUMINURIA CREATININE RATIO LESS THAN 30 MG/G (CMS/H*: Primary | ICD-10-CM

## 2025-04-10 DIAGNOSIS — E11.40 DIABETIC NEUROPATHY WITH NEUROLOGIC COMPLICATION: ICD-10-CM

## 2025-04-10 DIAGNOSIS — D63.1 ANEMIA DUE TO STAGE 4 CHRONIC KIDNEY DISEASE: ICD-10-CM

## 2025-04-10 DIAGNOSIS — I51.9 MYXEDEMA HEART DISEASE: ICD-10-CM

## 2025-04-10 DIAGNOSIS — E03.9 MYXEDEMA HEART DISEASE: ICD-10-CM

## 2025-04-10 DIAGNOSIS — R60.0 BILATERAL LEG EDEMA: Primary | ICD-10-CM

## 2025-04-10 DIAGNOSIS — N18.32 CHRONIC KIDNEY DISEASE (CKD) STAGE G3B/A1, MODERATELY DECREASED GLOMERULAR FILTRATION RATE (GFR) BETWEEN 30-44 ML/MIN/1.73 SQUARE METER AND ALBUMINURIA CREATININE RATIO LESS THAN 30 MG/G (CMS/H*: ICD-10-CM

## 2025-04-10 DIAGNOSIS — M10.00 GOUTY NEURITIS: ICD-10-CM

## 2025-04-10 DIAGNOSIS — G63 GOUTY NEURITIS: ICD-10-CM

## 2025-04-10 DIAGNOSIS — I10 ESSENTIAL HYPERTENSION: ICD-10-CM

## 2025-04-10 DIAGNOSIS — E83.39 HYPERPHOSPHATEMIA: ICD-10-CM

## 2025-04-10 DIAGNOSIS — N18.4 ANEMIA DUE TO STAGE 4 CHRONIC KIDNEY DISEASE: ICD-10-CM

## 2025-04-10 LAB
ALBUMIN SERPL-MCNC: 3.9 G/DL (ref 3.5–5.2)
ANION GAP SERPL CALCULATED.3IONS-SCNC: 12.1 MMOL/L (ref 5–15)
BUN SERPL-MCNC: 74 MG/DL (ref 8–23)
BUN/CREAT SERPL: 25.5 (ref 7–25)
CALCIUM SPEC-SCNC: 9.4 MG/DL (ref 8.6–10.5)
CHLORIDE SERPL-SCNC: 107 MMOL/L (ref 98–107)
CO2 SERPL-SCNC: 21.9 MMOL/L (ref 22–29)
CREAT SERPL-MCNC: 2.9 MG/DL (ref 0.57–1)
CREAT UR-MCNC: 279.5 MG/DL
EGFRCR SERPLBLD CKD-EPI 2021: 16.3 ML/MIN/1.73
GLUCOSE SERPL-MCNC: 107 MG/DL (ref 65–99)
MAGNESIUM SERPL-MCNC: 2.6 MG/DL (ref 1.6–2.4)
PHOSPHATE SERPL-MCNC: 5.5 MG/DL (ref 2.5–4.5)
POTASSIUM SERPL-SCNC: 5.3 MMOL/L (ref 3.5–5.2)
PROT ?TM UR-MCNC: 17.5 MG/DL
PROT/CREAT UR: 0.06 MG/G{CREAT}
SODIUM SERPL-SCNC: 141 MMOL/L (ref 136–145)

## 2025-04-10 PROCEDURE — 81001 URINALYSIS AUTO W/SCOPE: CPT

## 2025-04-10 PROCEDURE — 85025 COMPLETE CBC W/AUTO DIFF WBC: CPT

## 2025-04-10 PROCEDURE — 82728 ASSAY OF FERRITIN: CPT

## 2025-04-10 PROCEDURE — 82607 VITAMIN B-12: CPT

## 2025-04-10 PROCEDURE — 82306 VITAMIN D 25 HYDROXY: CPT

## 2025-04-10 PROCEDURE — 82570 ASSAY OF URINE CREATININE: CPT

## 2025-04-10 PROCEDURE — 83735 ASSAY OF MAGNESIUM: CPT

## 2025-04-10 PROCEDURE — 84466 ASSAY OF TRANSFERRIN: CPT

## 2025-04-10 PROCEDURE — 84156 ASSAY OF PROTEIN URINE: CPT

## 2025-04-10 PROCEDURE — 82746 ASSAY OF FOLIC ACID SERUM: CPT

## 2025-04-10 PROCEDURE — 83970 ASSAY OF PARATHORMONE: CPT

## 2025-04-10 PROCEDURE — 80069 RENAL FUNCTION PANEL: CPT

## 2025-04-10 PROCEDURE — 83540 ASSAY OF IRON: CPT

## 2025-04-10 PROCEDURE — 36415 COLL VENOUS BLD VENIPUNCTURE: CPT

## 2025-04-10 NOTE — PROGRESS NOTES
"Chief Complaint  Edema (Bilateral leg edema- is better than previously. Still having some swelling.  )    Subjective          Deisy Lama presents to [facility]  History of Present Illness    History of Present Illness  The patient is a 74-year-old female who presents for a 1-month follow-up, having swelling in her legs bilaterally, better than it has been, still apparent though, has lost weight since last visit, down to 167 pounds.    She reports an improvement in her condition, with a decrease in leg swelling. She has been practicing leg elevation, which she believes has contributed to the reduction in swelling.    She is on spironolactone for blood pressure.    MEDICATIONS  spironolactone      Objective   Vital Signs:   /60 (BP Location: Right arm, Patient Position: Sitting, Cuff Size: Adult)   Pulse 60   Temp 97.6 °F (36.4 °C)   Resp 16   Ht 162.6 cm (64\")   Wt 75.8 kg (167 lb)   SpO2 96%   BMI 28.67 kg/m²       Physical Exam  Vitals reviewed.   Constitutional:       Appearance: Normal appearance. She is well-developed.   HENT:      Head: Normocephalic and atraumatic.      Right Ear: External ear normal.      Left Ear: External ear normal.      Nose: Nose normal.   Eyes:      Conjunctiva/sclera: Conjunctivae normal.      Pupils: Pupils are equal, round, and reactive to light.   Cardiovascular:      Rate and Rhythm: Normal rate.   Pulmonary:      Effort: Pulmonary effort is normal.      Breath sounds: Normal breath sounds.   Abdominal:      General: There is no distension.   Musculoskeletal:      Right lower leg: Edema present.      Left lower leg: Edema present.   Skin:     General: Skin is warm and dry.   Neurological:      Mental Status: She is alert and oriented to person, place, and time. Mental status is at baseline.   Psychiatric:         Mood and Affect: Mood and affect normal.         Behavior: Behavior normal.         Thought Content: Thought content " normal.         Judgment: Judgment normal.          Result Review :   The following data was reviewed by: Sanchez Valenzuela DO on 04/10/2025:  Common labs          12/20/2024    15:27 2/18/2025    12:02 2/18/2025    12:24 2/18/2025    12:26 3/11/2025    13:25   Common Labs   Glucose 119    98  115    BUN 38    43  58    Creatinine 2.10    2.04  2.72    Sodium 143    145  141    Potassium 4.6    5.0  4.9    Chloride 108    112  108    Calcium 9.6    9.3  9.3    Albumin 3.9    3.8  3.9    Total Bilirubin    0.3     Alkaline Phosphatase    112     AST (SGOT)    18     ALT (SGPT)    14     WBC 11.99    12.51     Hemoglobin 12.2    11.7     Hematocrit 38.4    36.0     Platelets 478    424     Total Cholesterol    97     Triglycerides    40     HDL Cholesterol    38     LDL Cholesterol     48     Hemoglobin A1C  6.2       Microalbumin, Urine   2.7      Uric Acid 6.6               Results                   Assessment and Plan    Diagnoses and all orders for this visit:    1. Bilateral leg edema (Primary)    2. Hypothyroidism, unspecified type    3. CKD (chronic kidney disease) stage 4, GFR 15-29 ml/min    4. Essential hypertension    5. Type 2 diabetes mellitus with hyperglycemia, unspecified whether long term insulin use        Assessment & Plan  1. Bilateral lower extremity edema.  She reports that the swelling in her legs has improved but is still apparent. She has been advised to continue elevating her legs as much as possible to manage the edema. Compression therapy and water pills have been discussed, but she finds compression sometimes makes it worse or hurts. Labs will be checked today to monitor her potassium levels, especially since she is on spironolactone, which can raise potassium levels.    2. Chronic kidney disease (CKD).  Labs will be checked today to monitor her CKD. She should continue all prescribed medications.    3. Anemia.  Labs will be checked today to monitor her anemia. She should continue all  prescribed medications.    4. Diabetes.  Labs will be checked today to monitor her diabetes. She should continue all prescribed medications.    5. Obesity.  She has lost weight since the last visit, now down to 167 pounds. She should continue all prescribed medications and maintain a healthy diet and exercise routine.    6. Blood pressure management.  Her blood pressure is at goal, less than 140/90. She should continue taking spironolactone as prescribed.    Follow-up  The patient will follow up in 2 months.          Follow Up   Return in about 6 weeks (around 5/22/2025), or if symptoms worsen or fail to improve, for Next scheduled follow up, Recheck.    Patient was given instructions and counseling regarding her condition or for health maintenance advice. Please see specific information pulled into the AVS if appropriate.       Transcribed from ambient dictation for Sanchez Valenzuela DO by Sanchez Valenzuela DO.  04/10/25   13:10 EDT    Patient or patient representative verbalized consent for the use of Ambient Listening during the visit with  Sanchez Valenzuela DO for chart documentation. 4/10/2025  13:10 EDT

## 2025-04-11 LAB
25(OH)D3 SERPL-MCNC: 47 NG/ML (ref 30–100)
BACTERIA UR QL AUTO: ABNORMAL /HPF
BASOPHILS # BLD AUTO: 0.1 10*3/MM3 (ref 0–0.2)
BASOPHILS NFR BLD AUTO: 1.1 % (ref 0–1.5)
BILIRUB UR QL STRIP: NEGATIVE
CLARITY UR: ABNORMAL
COLOR UR: YELLOW
DEPRECATED RDW RBC AUTO: 43.8 FL (ref 37–54)
EOSINOPHIL # BLD AUTO: 0.65 10*3/MM3 (ref 0–0.4)
EOSINOPHIL NFR BLD AUTO: 7.1 % (ref 0.3–6.2)
ERYTHROCYTE [DISTWIDTH] IN BLOOD BY AUTOMATED COUNT: 13.5 % (ref 12.3–15.4)
FERRITIN SERPL-MCNC: 148 NG/ML (ref 13–150)
FOLATE SERPL-MCNC: 18.7 NG/ML (ref 4.78–24.2)
GLUCOSE UR STRIP-MCNC: NEGATIVE MG/DL
HCT VFR BLD AUTO: 37.5 % (ref 34–46.6)
HGB BLD-MCNC: 12.5 G/DL (ref 12–15.9)
HGB UR QL STRIP.AUTO: NEGATIVE
HYALINE CASTS UR QL AUTO: ABNORMAL /LPF
IMM GRANULOCYTES # BLD AUTO: 0.02 10*3/MM3 (ref 0–0.05)
IMM GRANULOCYTES NFR BLD AUTO: 0.2 % (ref 0–0.5)
IRON 24H UR-MRATE: 76 MCG/DL (ref 37–145)
IRON SATN MFR SERPL: 23 % (ref 20–50)
KETONES UR QL STRIP: ABNORMAL
LEUKOCYTE ESTERASE UR QL STRIP.AUTO: ABNORMAL
LYMPHOCYTES # BLD AUTO: 2.49 10*3/MM3 (ref 0.7–3.1)
LYMPHOCYTES NFR BLD AUTO: 27.1 % (ref 19.6–45.3)
MCH RBC QN AUTO: 29.7 PG (ref 26.6–33)
MCHC RBC AUTO-ENTMCNC: 33.3 G/DL (ref 31.5–35.7)
MCV RBC AUTO: 89.1 FL (ref 79–97)
MONOCYTES # BLD AUTO: 1.02 10*3/MM3 (ref 0.1–0.9)
MONOCYTES NFR BLD AUTO: 11.1 % (ref 5–12)
NEUTROPHILS NFR BLD AUTO: 4.92 10*3/MM3 (ref 1.7–7)
NEUTROPHILS NFR BLD AUTO: 53.4 % (ref 42.7–76)
NITRITE UR QL STRIP: NEGATIVE
NRBC BLD AUTO-RTO: 0 /100 WBC (ref 0–0.2)
PH UR STRIP.AUTO: 5.5 [PH] (ref 5–8)
PLATELET # BLD AUTO: 368 10*3/MM3 (ref 140–450)
PMV BLD AUTO: 10.2 FL (ref 6–12)
PROT UR QL STRIP: ABNORMAL
PTH-INTACT SERPL-MCNC: 44.9 PG/ML (ref 15–65)
RBC # BLD AUTO: 4.21 10*6/MM3 (ref 3.77–5.28)
RBC # UR STRIP: ABNORMAL /HPF
REF LAB TEST METHOD: ABNORMAL
SP GR UR STRIP: 1.02 (ref 1–1.03)
SQUAMOUS #/AREA URNS HPF: ABNORMAL /HPF
TIBC SERPL-MCNC: 332 MCG/DL (ref 298–536)
TRANSFERRIN SERPL-MCNC: 223 MG/DL (ref 200–360)
UROBILINOGEN UR QL STRIP: ABNORMAL
VIT B12 BLD-MCNC: 367 PG/ML (ref 211–946)
WBC # UR STRIP: ABNORMAL /HPF
WBC CLUMPS # UR AUTO: PRESENT /HPF
WBC NRBC COR # BLD AUTO: 9.2 10*3/MM3 (ref 3.4–10.8)
YEAST URNS QL MICRO: PRESENT /HPF

## 2025-04-16 DIAGNOSIS — R60.0 BILATERAL LEG EDEMA: ICD-10-CM

## 2025-04-16 RX ORDER — BUMETANIDE 2 MG/1
2 TABLET ORAL DAILY
Qty: 30 TABLET | Refills: 5 | Status: SHIPPED | OUTPATIENT
Start: 2025-04-16

## 2025-04-21 DIAGNOSIS — E03.9 HYPOTHYROIDISM, UNSPECIFIED TYPE: ICD-10-CM

## 2025-04-21 RX ORDER — LEVOTHYROXINE SODIUM 125 UG/1
125 TABLET ORAL DAILY
Qty: 90 TABLET | Refills: 0 | Status: SHIPPED | OUTPATIENT
Start: 2025-04-21

## 2025-04-29 RX ORDER — SODIUM ZIRCONIUM CYCLOSILICATE 5 G/5G
POWDER, FOR SUSPENSION ORAL
Qty: 11 EACH | OUTPATIENT
Start: 2025-04-29

## 2025-05-01 ENCOUNTER — LAB (OUTPATIENT)
Dept: LAB | Facility: HOSPITAL | Age: 75
End: 2025-05-01
Payer: MEDICARE

## 2025-05-01 DIAGNOSIS — N18.4 CKD (CHRONIC KIDNEY DISEASE) STAGE 4, GFR 15-29 ML/MIN: ICD-10-CM

## 2025-05-01 DIAGNOSIS — N18.4 CKD (CHRONIC KIDNEY DISEASE) STAGE 4, GFR 15-29 ML/MIN: Primary | ICD-10-CM

## 2025-05-01 LAB
ALBUMIN SERPL-MCNC: 3.9 G/DL (ref 3.5–5.2)
ANION GAP SERPL CALCULATED.3IONS-SCNC: 10 MMOL/L (ref 5–15)
BUN SERPL-MCNC: 54 MG/DL (ref 8–23)
BUN/CREAT SERPL: 15.2 (ref 7–25)
CALCIUM SPEC-SCNC: 9.2 MG/DL (ref 8.6–10.5)
CHLORIDE SERPL-SCNC: 107 MMOL/L (ref 98–107)
CO2 SERPL-SCNC: 23 MMOL/L (ref 22–29)
CREAT SERPL-MCNC: 3.55 MG/DL (ref 0.57–1)
EGFRCR SERPLBLD CKD-EPI 2021: 12.7 ML/MIN/1.73
GLUCOSE SERPL-MCNC: 127 MG/DL (ref 65–99)
MAGNESIUM SERPL-MCNC: 2.7 MG/DL (ref 1.6–2.4)
PHOSPHATE SERPL-MCNC: 4.7 MG/DL (ref 2.5–4.5)
POTASSIUM SERPL-SCNC: 5.2 MMOL/L (ref 3.5–5.2)
SODIUM SERPL-SCNC: 140 MMOL/L (ref 136–145)

## 2025-05-01 PROCEDURE — 83735 ASSAY OF MAGNESIUM: CPT

## 2025-05-01 PROCEDURE — 36415 COLL VENOUS BLD VENIPUNCTURE: CPT

## 2025-05-01 PROCEDURE — 80069 RENAL FUNCTION PANEL: CPT

## 2025-05-06 ENCOUNTER — TRANSCRIBE ORDERS (OUTPATIENT)
Dept: LAB | Facility: HOSPITAL | Age: 75
End: 2025-05-06
Payer: MEDICARE

## 2025-05-06 DIAGNOSIS — N18.32 CHRONIC KIDNEY DISEASE (CKD) STAGE G3B/A1, MODERATELY DECREASED GLOMERULAR FILTRATION RATE (GFR) BETWEEN 30-44 ML/MIN/1.73 SQUARE METER AND ALBUMINURIA CREATININE RATIO LESS THAN 30 MG/G (CMS/H*: Primary | ICD-10-CM

## 2025-05-06 DIAGNOSIS — E78.2 MIXED HYPERLIPIDEMIA: ICD-10-CM

## 2025-05-06 DIAGNOSIS — E11.40 DIABETIC NEUROPATHY WITH NEUROLOGIC COMPLICATION: ICD-10-CM

## 2025-05-06 DIAGNOSIS — E11.8 DIABETIC COMPLICATION: ICD-10-CM

## 2025-05-06 DIAGNOSIS — E03.9 MYXEDEMA HEART DISEASE: ICD-10-CM

## 2025-05-06 DIAGNOSIS — I10 ESSENTIAL HYPERTENSION, MALIGNANT: ICD-10-CM

## 2025-05-06 DIAGNOSIS — I50.22 CHRONIC SYSTOLIC HEART FAILURE: ICD-10-CM

## 2025-05-06 DIAGNOSIS — I51.9 MYXEDEMA HEART DISEASE: ICD-10-CM

## 2025-05-06 DIAGNOSIS — G63 GOUTY NEURITIS: ICD-10-CM

## 2025-05-06 DIAGNOSIS — E11.49 DIABETIC NEUROPATHY WITH NEUROLOGIC COMPLICATION: ICD-10-CM

## 2025-05-06 DIAGNOSIS — M10.00 GOUTY NEURITIS: ICD-10-CM

## 2025-05-07 ENCOUNTER — LAB (OUTPATIENT)
Dept: LAB | Facility: HOSPITAL | Age: 75
End: 2025-05-07
Payer: MEDICARE

## 2025-05-07 DIAGNOSIS — N18.32 CHRONIC KIDNEY DISEASE (CKD) STAGE G3B/A1, MODERATELY DECREASED GLOMERULAR FILTRATION RATE (GFR) BETWEEN 30-44 ML/MIN/1.73 SQUARE METER AND ALBUMINURIA CREATININE RATIO LESS THAN 30 MG/G (CMS/H*: ICD-10-CM

## 2025-05-07 DIAGNOSIS — E11.49 DIABETIC NEUROPATHY WITH NEUROLOGIC COMPLICATION: ICD-10-CM

## 2025-05-07 DIAGNOSIS — E03.9 MYXEDEMA HEART DISEASE: ICD-10-CM

## 2025-05-07 DIAGNOSIS — M10.00 GOUTY NEURITIS: ICD-10-CM

## 2025-05-07 DIAGNOSIS — E78.2 MIXED HYPERLIPIDEMIA: ICD-10-CM

## 2025-05-07 DIAGNOSIS — I51.9 MYXEDEMA HEART DISEASE: ICD-10-CM

## 2025-05-07 DIAGNOSIS — I10 ESSENTIAL HYPERTENSION, MALIGNANT: ICD-10-CM

## 2025-05-07 DIAGNOSIS — I50.22 CHRONIC SYSTOLIC HEART FAILURE: ICD-10-CM

## 2025-05-07 DIAGNOSIS — E11.8 DIABETIC COMPLICATION: ICD-10-CM

## 2025-05-07 DIAGNOSIS — N17.9 AKI (ACUTE KIDNEY INJURY): ICD-10-CM

## 2025-05-07 DIAGNOSIS — E11.40 DIABETIC NEUROPATHY WITH NEUROLOGIC COMPLICATION: ICD-10-CM

## 2025-05-07 DIAGNOSIS — G63 GOUTY NEURITIS: ICD-10-CM

## 2025-05-07 LAB
ALBUMIN SERPL-MCNC: 3.7 G/DL (ref 3.5–5.2)
ANION GAP SERPL CALCULATED.3IONS-SCNC: 8.6 MMOL/L (ref 5–15)
BACTERIA UR QL AUTO: ABNORMAL /HPF
BASOPHILS # BLD AUTO: 0.11 10*3/MM3 (ref 0–0.2)
BASOPHILS NFR BLD AUTO: 1 % (ref 0–1.5)
BILIRUB UR QL STRIP: NEGATIVE
BUN SERPL-MCNC: 56 MG/DL (ref 8–23)
BUN/CREAT SERPL: 24.8 (ref 7–25)
CALCIUM SPEC-SCNC: 9.4 MG/DL (ref 8.6–10.5)
CHLORIDE SERPL-SCNC: 111 MMOL/L (ref 98–107)
CLARITY UR: CLEAR
CO2 SERPL-SCNC: 23.4 MMOL/L (ref 22–29)
COLOR UR: YELLOW
CREAT SERPL-MCNC: 2.26 MG/DL (ref 0.57–1)
CREAT UR-MCNC: 70.2 MG/DL
DEPRECATED RDW RBC AUTO: 43.8 FL (ref 37–54)
EGFRCR SERPLBLD CKD-EPI 2021: 21.9 ML/MIN/1.73
EOSINOPHIL # BLD AUTO: 0.81 10*3/MM3 (ref 0–0.4)
EOSINOPHIL NFR BLD AUTO: 7.4 % (ref 0.3–6.2)
ERYTHROCYTE [DISTWIDTH] IN BLOOD BY AUTOMATED COUNT: 13.3 % (ref 12.3–15.4)
GLUCOSE SERPL-MCNC: 101 MG/DL (ref 65–99)
GLUCOSE UR STRIP-MCNC: NEGATIVE MG/DL
HCT VFR BLD AUTO: 38.3 % (ref 34–46.6)
HGB BLD-MCNC: 12.1 G/DL (ref 12–15.9)
HGB UR QL STRIP.AUTO: NEGATIVE
HYALINE CASTS UR QL AUTO: ABNORMAL /LPF
IMM GRANULOCYTES # BLD AUTO: 0.03 10*3/MM3 (ref 0–0.05)
IMM GRANULOCYTES NFR BLD AUTO: 0.3 % (ref 0–0.5)
KETONES UR QL STRIP: NEGATIVE
LEUKOCYTE ESTERASE UR QL STRIP.AUTO: ABNORMAL
LYMPHOCYTES # BLD AUTO: 2.67 10*3/MM3 (ref 0.7–3.1)
LYMPHOCYTES NFR BLD AUTO: 24.5 % (ref 19.6–45.3)
MCH RBC QN AUTO: 28.9 PG (ref 26.6–33)
MCHC RBC AUTO-ENTMCNC: 31.6 G/DL (ref 31.5–35.7)
MCV RBC AUTO: 91.4 FL (ref 79–97)
MONOCYTES # BLD AUTO: 1.38 10*3/MM3 (ref 0.1–0.9)
MONOCYTES NFR BLD AUTO: 12.6 % (ref 5–12)
NEUTROPHILS NFR BLD AUTO: 5.91 10*3/MM3 (ref 1.7–7)
NEUTROPHILS NFR BLD AUTO: 54.2 % (ref 42.7–76)
NITRITE UR QL STRIP: NEGATIVE
NRBC BLD AUTO-RTO: 0 /100 WBC (ref 0–0.2)
PH UR STRIP.AUTO: 6.5 [PH] (ref 5–8)
PHOSPHATE SERPL-MCNC: 4.5 MG/DL (ref 2.5–4.5)
PLATELET # BLD AUTO: 441 10*3/MM3 (ref 140–450)
PMV BLD AUTO: 10.6 FL (ref 6–12)
POTASSIUM SERPL-SCNC: 5.8 MMOL/L (ref 3.5–5.2)
PROT ?TM UR-MCNC: 17.4 MG/DL
PROT UR QL STRIP: ABNORMAL
PROT/CREAT UR: 0.25 MG/G{CREAT}
RBC # BLD AUTO: 4.19 10*6/MM3 (ref 3.77–5.28)
RBC # UR STRIP: ABNORMAL /HPF
REF LAB TEST METHOD: ABNORMAL
SODIUM SERPL-SCNC: 143 MMOL/L (ref 136–145)
SP GR UR STRIP: 1.02 (ref 1–1.03)
SQUAMOUS #/AREA URNS HPF: ABNORMAL /HPF
UROBILINOGEN UR QL STRIP: ABNORMAL
WBC # UR STRIP: ABNORMAL /HPF
WBC NRBC COR # BLD AUTO: 10.91 10*3/MM3 (ref 3.4–10.8)

## 2025-05-07 PROCEDURE — 84156 ASSAY OF PROTEIN URINE: CPT

## 2025-05-07 PROCEDURE — 82570 ASSAY OF URINE CREATININE: CPT

## 2025-05-07 PROCEDURE — 80069 RENAL FUNCTION PANEL: CPT

## 2025-05-07 PROCEDURE — 36415 COLL VENOUS BLD VENIPUNCTURE: CPT

## 2025-05-07 PROCEDURE — 85025 COMPLETE CBC W/AUTO DIFF WBC: CPT

## 2025-05-07 PROCEDURE — 81001 URINALYSIS AUTO W/SCOPE: CPT

## 2025-05-22 ENCOUNTER — TRANSCRIBE ORDERS (OUTPATIENT)
Dept: LAB | Facility: HOSPITAL | Age: 75
End: 2025-05-22
Payer: MEDICARE

## 2025-05-22 DIAGNOSIS — N17.9 ACUTE RENAL FAILURE, UNSPECIFIED ACUTE RENAL FAILURE TYPE: ICD-10-CM

## 2025-05-22 DIAGNOSIS — N18.32 CHRONIC KIDNEY DISEASE (CKD) STAGE G3B/A1, MODERATELY DECREASED GLOMERULAR FILTRATION RATE (GFR) BETWEEN 30-44 ML/MIN/1.73 SQUARE METER AND ALBUMINURIA CREATININE RATIO LESS THAN 30 MG/G (CMS/H*: Primary | ICD-10-CM

## 2025-05-23 ENCOUNTER — LAB (OUTPATIENT)
Dept: LAB | Facility: HOSPITAL | Age: 75
End: 2025-05-23
Payer: MEDICARE

## 2025-05-23 DIAGNOSIS — N18.32 CHRONIC KIDNEY DISEASE (CKD) STAGE G3B/A1, MODERATELY DECREASED GLOMERULAR FILTRATION RATE (GFR) BETWEEN 30-44 ML/MIN/1.73 SQUARE METER AND ALBUMINURIA CREATININE RATIO LESS THAN 30 MG/G (CMS/H*: ICD-10-CM

## 2025-05-23 DIAGNOSIS — N17.9 ACUTE RENAL FAILURE, UNSPECIFIED ACUTE RENAL FAILURE TYPE: ICD-10-CM

## 2025-05-23 LAB
ALBUMIN SERPL-MCNC: 3.9 G/DL (ref 3.5–5.2)
ANION GAP SERPL CALCULATED.3IONS-SCNC: 11 MMOL/L (ref 5–15)
BUN SERPL-MCNC: 64 MG/DL (ref 8–23)
BUN/CREAT SERPL: 27.4 (ref 7–25)
CALCIUM SPEC-SCNC: 9 MG/DL (ref 8.6–10.5)
CHLORIDE SERPL-SCNC: 111 MMOL/L (ref 98–107)
CO2 SERPL-SCNC: 21 MMOL/L (ref 22–29)
CREAT SERPL-MCNC: 2.34 MG/DL (ref 0.57–1)
EGFRCR SERPLBLD CKD-EPI 2021: 21 ML/MIN/1.73
GLUCOSE SERPL-MCNC: 96 MG/DL (ref 65–99)
MAGNESIUM SERPL-MCNC: 2.5 MG/DL (ref 1.6–2.4)
PHOSPHATE SERPL-MCNC: 5.2 MG/DL (ref 2.5–4.5)
POTASSIUM SERPL-SCNC: 4.9 MMOL/L (ref 3.5–5.2)
SODIUM SERPL-SCNC: 143 MMOL/L (ref 136–145)
URATE SERPL-MCNC: 9.1 MG/DL (ref 2.4–5.7)

## 2025-05-23 PROCEDURE — 83735 ASSAY OF MAGNESIUM: CPT

## 2025-05-23 PROCEDURE — 80069 RENAL FUNCTION PANEL: CPT

## 2025-05-23 PROCEDURE — 84550 ASSAY OF BLOOD/URIC ACID: CPT

## 2025-05-23 PROCEDURE — 36415 COLL VENOUS BLD VENIPUNCTURE: CPT

## 2025-05-29 ENCOUNTER — OFFICE VISIT (OUTPATIENT)
Age: 75
End: 2025-05-29
Payer: MEDICARE

## 2025-05-29 ENCOUNTER — HOSPITAL ENCOUNTER (OUTPATIENT)
Dept: CARDIOLOGY | Facility: HOSPITAL | Age: 75
Discharge: HOME | End: 2025-05-29
Admitting: NURSE PRACTITIONER
Payer: MEDICARE

## 2025-05-29 VITALS
WEIGHT: 167 LBS | HEIGHT: 64 IN | OXYGEN SATURATION: 95 % | DIASTOLIC BLOOD PRESSURE: 84 MMHG | HEART RATE: 51 BPM | SYSTOLIC BLOOD PRESSURE: 173 MMHG | RESPIRATION RATE: 18 BRPM | BODY MASS INDEX: 28.51 KG/M2 | TEMPERATURE: 97.7 F

## 2025-05-29 DIAGNOSIS — I71.43 INFRARENAL ABDOMINAL AORTIC ANEURYSM (AAA) WITHOUT RUPTURE: Primary | ICD-10-CM

## 2025-05-29 DIAGNOSIS — I71.43 INFRARENAL ABDOMINAL AORTIC ANEURYSM (AAA) WITHOUT RUPTURE: ICD-10-CM

## 2025-05-29 LAB
ABDOMINAL DIST AORTA AP: 2.4 CM
ABDOMINAL DIST AORTA TRANS: 2.2 CM
ABDOMINAL DIST AORTA VEL: 95.8 CM/S
ABDOMINAL LT COM ILIAC AP: 1 CM
ABDOMINAL LT COM ILIAC TRANS: 1.1 CM
ABDOMINAL LT COM ILIAC VEL: 307 CM/S
ABDOMINAL MID AORTA AP: 4.5 CM
ABDOMINAL MID AORTA TRANS: 4.6 CM
ABDOMINAL MID AORTA VEL: 115.1 CM/S
ABDOMINAL PROX AORTA AP: 1.9 CM
ABDOMINAL PROX AORTA TRANS: 1.8 CM
ABDOMINAL PROX AORTA VEL: 107.1 CM/S
ABDOMINAL RT COM ILIAC AP: 1.1 CM
ABDOMINAL RT COM ILIAC TRANS: 1.2 CM
ABDOMINAL RT COM ILIAC VEL: 294.5 CM/S

## 2025-05-29 PROCEDURE — 93978 VASCULAR STUDY: CPT

## 2025-05-29 RX ORDER — VALSARTAN 160 MG/1
160 TABLET ORAL DAILY
COMMUNITY

## 2025-05-29 NOTE — PROGRESS NOTES
[facility] Vascular Surgery Office Follow Up Note     Date of Encounter: 05/29/2025     MRN Number: [id number]  Name: Deisy Lmaa  Phone Number: [phone]     Referred By: Juwan Rodríguez APRN  PCP: Sanchez Valenzuela DO    Chief Complaint:    Chief Complaint   Patient presents with    Aortic Aneurysm    Follow-up     Patient is a 75 year old female that returns to the clinic as an annual follow up with an aortic duplex today. In an unrelated concern, the patient has bilateral lower extremity 3+ pitting edema. Patient states the swelling is causing her to have issues with her knees.        Subjective      History of Present Illness:    Deisy Lama is a 75 y.o. female presents for annual follow-up with a aortic duplex performed today.  She denies any acute abdominal or back pain.  She has bilateral lower extremity edema.  She is followed by cardiology and nephrology for congestive heart failure and chronic kidney disease. They have been adjusting her medications but has not been able to control the edema.  She takes a daily statin medication.    Review of Systems:  ROS  Review of Systems   Constitutional: Negative.   HENT: Negative.    Cardiovascular: Lower extremity edema.    Respiratory: Negative.    Skin: Negative.    Musculoskeletal: Negative.    Gastrointestinal: Negative.    Neurological: Negative.    Psychiatric/Behavioral: Negative.      I have reviewed the following portions of the patient's history: problem list, current medications, allergies, past surgical history, past medical history, past social history, past family history, and ROS and confirm it's accurate.    Allergies:  Allergies   Allergen Reactions    Morphine Itching    Dilaudid [Hydromorphone] Confusion    Latex Itching    Lisinopril Cough       Medications:      Current Outpatient Medications:     ammonium lactate (LAC-HYDRIN) 12 % lotion, APPLY 1 GRAM TO THE SKIN EVERY DAY, Disp: , Rfl:     atorvastatin (LIPITOR) 10 MG  tablet, Take 1 tablet by mouth Every Night., Disp: 90 tablet, Rfl: 3    Blood Glucose Monitoring Suppl (Accu-Chek Guide) w/Device kit, Use 1 each Daily., Disp: 1 kit, Rfl: 0    brimonidine (ALPHAGAN) 0.2 % ophthalmic solution, Administer 1 drop to both eyes 2 (Two) Times a Day., Disp: , Rfl:     bumetanide (BUMEX) 2 MG tablet, Take 1 tablet by mouth., Disp: , Rfl:     carvedilol (COREG) 25 MG tablet, Take 1 tablet by mouth 2 (Two) Times a Day., Disp: 180 tablet, Rfl: 3    dorzolamide (TRUSOPT) 2 % ophthalmic solution, Administer 1 drop to both eyes 2 (Two) Times a Day., Disp: , Rfl:     dorzolamide-timolol (COSOPT) 2-0.5 % ophthalmic solution, , Disp: , Rfl:     escitalopram (LEXAPRO) 10 MG tablet, Take 1 tablet by mouth Daily., Disp: 90 tablet, Rfl: 3    glucose blood test strip, Use to check blood sugar 2 times a day, Disp: 200 each, Rfl: 12    glucose monitor monitoring kit, Use 1 each As Needed (CHECK BLOOD GLUCOSE DAILY TO MONITOR BLOOD GLUCOSE)., Disp: 1 each, Rfl: 0    HYDROcodone-acetaminophen (NORCO) 7.5-325 MG per tablet, Take 1 tablet by mouth Every 6 (Six) Hours As Needed for Moderate Pain., Disp: 12 tablet, Rfl: 0    latanoprost (XALATAN) 0.005 % ophthalmic solution, , Disp: , Rfl:     levothyroxine (SYNTHROID, LEVOTHROID) 125 MCG tablet, TAKE 1 TABLET BY MOUTH DAILY, Disp: 90 tablet, Rfl: 0    Mounjaro 2.5 MG/0.5ML solution auto-injector, , Disp: , Rfl:     omeprazole (priLOSEC) 40 MG capsule, Take 1 capsule by mouth Daily., Disp: 90 capsule, Rfl: 3    prednisoLONE acetate (PRED FORTE) 1 % ophthalmic suspension, , Disp: , Rfl:     rOPINIRole (REQUIP) 4 MG tablet, Take 1 tablet by mouth 4 (Four) Times a Day. 4 Times Daily, Disp: 360 tablet, Rfl: 3    spironolactone (ALDACTONE) 50 MG tablet, Take 1 tablet by mouth Daily., Disp: 90 tablet, Rfl: 3    valsartan (DIOVAN) 160 MG tablet, Take 1 tablet by mouth Daily., Disp: , Rfl:     furosemide (LASIX) 40 MG tablet, Take 1 tablet by mouth Daily., Disp: , Rfl:      sodium zirconium cyclosilicate (Lokelma) 5 g packet, Take 5 g by mouth Daily. 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    Diabetes mellitus     AVERAGE AM BS 'S    Glaucoma     Hiatal hernia     History of transfusion     LAST 2023 REPORTS NO TRANSFUSION REACTION POST SPINAL FUSION    Hyperlipidemia LDL goal <100 08/23/2021    Hypertension     Kidney stone     NO CURRENT ISSUES    Presence of biventricular implantable cardioverter-defibrillator (ICD) 08/23/2021    Sciatica     LEFT    Sleep apnea     Thyroid disorder     hypo    Wound of back     REPORTS HAS NOT HEALED SINCE BACK SURGERY AND IS FOLLOWED BY WOUND CARE       Past Surgical History:   Procedure Laterality Date    APPENDECTOMY      BLADDER SURGERY      REPAIR    CARDIAC DEFIBRILLATOR PLACEMENT      CATARACT EXTRACTION Bilateral     WITH LENS IMPLANT    CHOLECYSTECTOMY      COLONOSCOPY      GASTROPLASTY      FOR WEIGHT LOSS    HEEL SPUR SURGERY      HERNIA REPAIR      ABDOMINAL    HYSTERECTOMY      INGUINAL HERNIA REPAIR Bilateral 8/31/2022    Procedure: INGUINAL HERNIA REPAIR LAPAROSCOPIC WITH [email or web address] ROBOT, lysis of adhesions;  Surgeon: Lauri Celis MD;  Location: [facility] OR [facility] [place];  Service: Robotics - [employer];  Laterality: Bilateral;    KIDNEY STONE SURGERY      LUMBAR DISCECTOMY Left 1/27/2023    Procedure: MINIMALLY INVASIVE LUMBAR LATERAL RECESS DECOMPRESSION AND FORAMINOTOMY,  "LEFT APPROACH, LUMBAR 2-LUMBAR 3;  Surgeon: Ralf Rodriguez MD;  Location: [facility] MAIN OR;  Service: Neurosurgery;  Laterality: Left;    PACEMAKER IMPLANTATION      PACEMAKER REPLACEMENT N/A 11/28/2023    Procedure: PPM generator change - dual;  Surgeon: Michael Bernal MD;  Location: [facility] CATH INVASIVE LOCATION;  Service: Cardiovascular;  Laterality: N/A;    SPLENECTOMY      TRAUMATIC INJURY R/T GASTROPLASTY SURGERY    TONSILLECTOMY         Social History     Socioeconomic History    Marital status:    Tobacco Use    Smoking status: Never     Passive exposure: Never    Smokeless tobacco: Never   Vaping Use    Vaping status: Never Used   Substance and Sexual Activity    Alcohol use: Never    Drug use: Never    Sexual activity: Defer        Family History   Problem Relation Age of Onset    Stroke Mother     Diabetes Mother     Arthritis Mother     Osteoporosis Mother     Heart disease Father     Cancer Father         PROSTATE    Arthritis Father     Osteoporosis Father     Diabetes Brother     Cancer Son     Malig Hyperthermia Neg Hx        Objective     Physical Exam:  Vitals:    05/29/25 0957 05/29/25 1001   BP: 165/95 173/84   BP Location: Right arm Left arm   Patient Position: Sitting Sitting   Cuff Size: Large Adult Large Adult   Pulse: 51    Resp: 18    Temp: 97.7 °F (36.5 °C)    TempSrc: Oral    SpO2: 95%    Weight: 75.8 kg (167 lb)    Height: 162.6 cm (64\")    PainSc: 0-No pain       Body mass index is 28.67 kg/m².  BMI is >= 25 and <30. (Overweight) The following options were offered after discussion;: information on healthy weight added to patient's after visit summary      Physical Exam  Physical Exam  Constitutional:       Appearance:Normal.   HENT:      Head: Normocephalic and atraumatic.   Eyes:      Extraocular Movements: Extraocular movements intact.      Pupils: Pupils are equal, round, and reactive to light.   Cardiovascular:      Rate and Rhythm: Normal rate and regular rhythm.  Bilateral " lower extremity edema, does not include the toes, negative stemmers, +2 palpable DP pulses.   Pulmonary:      Effort: Pulmonary effort is normal.      Abdominal:      Palpations: Abdomen is soft.   Musculoskeletal:      Cervical back: Normal range of motion and neck supple.   Skin:     General: Skin is warm and dry.   Neurological:      General: No focal deficit present.      Mental Status: Alert and oriented to person, place, and time.     Imaging/Labs:  I have reviewed the preliminary results of the abdominal duplex performed today.  The duplex measures the mid abdominal aorta at 4.6 cm, which is a slight increase from previous measurements that were approximately 4.4.        Assessment / Plan      Assessment / Plan:  Diagnoses and all orders for this visit:    1. Infrarenal abdominal aortic aneurysm (AAA) without rupture (Primary)       Ms. Lama has an abdominal aortic aneurysm, the duplex performed today reveals measurement at approximately 4.6 centimeters which is a slight increase from the 4.4 cm previously seen in 2024.  We have discussed in detail symptoms, testing and interventions along with her bilateral leg edema. She has an appointment with nephrology today and will notify us if she would prefer the venous testing.  I recommend continued annual surveillance for the abdominal aortic aneurysm, follow-up in 1 year with a aorta duplex.    I have answered all of her questions and she is in agreement with the plan at this time. Thank you for allowing me to participate in your patient's care.    Patient Education: Lower extremity edema and possible differentials of venous reflux, systemic causes such as cardiac and kidney disease.        Follow Up:   No follow-ups on file.   Or sooner for any further concerns or worsening sign and symptoms. If unable to reach us in the office please dial 911 or go to the nearest emergency department.      Juwan BOYD  [facility] Vascular Surgery        yes

## 2025-06-10 ENCOUNTER — TRANSCRIBE ORDERS (OUTPATIENT)
Dept: ADMINISTRATIVE | Facility: HOSPITAL | Age: 75
End: 2025-06-10
Payer: MEDICARE

## 2025-06-10 DIAGNOSIS — R06.02 SHORTNESS OF BREATH: Primary | ICD-10-CM

## 2025-06-10 DIAGNOSIS — R07.9 CHEST PAIN, UNSPECIFIED TYPE: ICD-10-CM

## 2025-06-11 ENCOUNTER — HOSPITAL ENCOUNTER (OUTPATIENT)
Facility: HOSPITAL | Age: 75
Discharge: HOME | End: 2025-06-11
Admitting: INTERNAL MEDICINE
Payer: MEDICARE

## 2025-06-11 DIAGNOSIS — R07.9 CHEST PAIN, UNSPECIFIED TYPE: ICD-10-CM

## 2025-06-11 DIAGNOSIS — R06.02 SHORTNESS OF BREATH: ICD-10-CM

## 2025-06-11 PROCEDURE — 93306 TTE W/DOPPLER COMPLETE: CPT

## 2025-06-13 LAB
AORTIC DIMENSIONLESS INDEX: 0.51 (DI)
ASCENDING AORTA: 3.8 CM
AV MEAN PRESS GRAD SYS DOP V1V2: 6.5 MMHG
AV VMAX SYS DOP: 172.8 CM/SEC
BH CV ECHO MEAS - AO MAX PG: 11.9 MMHG
BH CV ECHO MEAS - AO ROOT DIAM: 3.4 CM
BH CV ECHO MEAS - AO V2 VTI: 35.2 CM
BH CV ECHO MEAS - AVA(I,D): 1.46 CM2
BH CV ECHO MEAS - EDV(MOD-SP2): 150.6 ML
BH CV ECHO MEAS - EDV(MOD-SP4): 110.7 ML
BH CV ECHO MEAS - EF(MOD-SP2): 55.9 %
BH CV ECHO MEAS - EF(MOD-SP4): 55.4 %
BH CV ECHO MEAS - ESV(MOD-SP2): 66.4 ML
BH CV ECHO MEAS - ESV(MOD-SP4): 49.4 ML
BH CV ECHO MEAS - FS: 27 %
BH CV ECHO MEAS - IVS/LVPW: 1 CM
BH CV ECHO MEAS - IVSD: 1.1 CM
BH CV ECHO MEAS - LA DIMENSION: 4.3 CM
BH CV ECHO MEAS - LAT PEAK E' VEL: 9.7 CM/SEC
BH CV ECHO MEAS - LV DIASTOLIC VOL/BSA (35-75): 61 CM2
BH CV ECHO MEAS - LV MAX PG: 1.96 MMHG
BH CV ECHO MEAS - LV MEAN PG: 1.5 MMHG
BH CV ECHO MEAS - LV SYSTOLIC VOL/BSA (12-30): 27.2 CM2
BH CV ECHO MEAS - LV V1 MAX: 70 CM/SEC
BH CV ECHO MEAS - LV V1 VTI: 18.1 CM
BH CV ECHO MEAS - LVIDD: 4.8 CM
BH CV ECHO MEAS - LVIDS: 3.5 CM
BH CV ECHO MEAS - LVOT AREA: 2.8 CM2
BH CV ECHO MEAS - LVOT DIAM: 1.9 CM
BH CV ECHO MEAS - LVPWD: 1.1 CM
BH CV ECHO MEAS - MED PEAK E' VEL: 8.4 CM/SEC
BH CV ECHO MEAS - MR MAX PG: 97.2 MMHG
BH CV ECHO MEAS - MR MAX VEL: 493 CM/SEC
BH CV ECHO MEAS - MR MEAN PG: 76.7 MMHG
BH CV ECHO MEAS - MR VTI: 232.1 CM
BH CV ECHO MEAS - MV A MAX VEL: 118.2 CM/SEC
BH CV ECHO MEAS - MV DEC SLOPE: 849 CM/SEC2
BH CV ECHO MEAS - MV DEC TIME: 173 SEC
BH CV ECHO MEAS - MV E MAX VEL: 94 CM/SEC
BH CV ECHO MEAS - MV E/A: 0.8
BH CV ECHO MEAS - MV MAX PG: 8.7 MMHG
BH CV ECHO MEAS - MV MEAN PG: 3.7 MMHG
BH CV ECHO MEAS - MV P1/2T: 44 MSEC
BH CV ECHO MEAS - MV V2 VTI: 39.5 CM
BH CV ECHO MEAS - MVA(P1/2T): 5 CM2
BH CV ECHO MEAS - MVA(VTI): 1.3 CM2
BH CV ECHO MEAS - PA ACC TIME: 0.09 SEC
BH CV ECHO MEAS - PA V2 MAX: 100 CM/SEC
BH CV ECHO MEAS - RAP SYSTOLE: 8 MMHG
BH CV ECHO MEAS - RVDD: 4 CM
BH CV ECHO MEAS - RVSP: 40.6 MMHG
BH CV ECHO MEAS - SV(LVOT): 51.3 ML
BH CV ECHO MEAS - SV(MOD-SP2): 84.2 ML
BH CV ECHO MEAS - SV(MOD-SP4): 61.3 ML
BH CV ECHO MEAS - SVI(LVOT): 28.3 ML/M2
BH CV ECHO MEAS - SVI(MOD-SP2): 46.4 ML/M2
BH CV ECHO MEAS - SVI(MOD-SP4): 33.8 ML/M2
BH CV ECHO MEAS - TAPSE (>1.6): 2.41 CM
BH CV ECHO MEAS - TR MAX PG: 32.6 MMHG
BH CV ECHO MEAS - TR MAX VEL: 285.4 CM/SEC
BH CV ECHO MEASUREMENTS AVERAGE E/E' RATIO: 10.39
BH CV XLRA - RV BASE: 4 CM
BH CV XLRA - RV MID: 2.9 CM
BH CV XLRA - TDI S': 13.3 CM/SEC
IVRT: 92 MS
LEFT ATRIUM VOLUME INDEX: 54 ML/M2
LV EF 2D ECHO EST: 55 %

## 2025-06-30 ENCOUNTER — TELEPHONE (OUTPATIENT)
Dept: CARDIOLOGY | Facility: CLINIC | Age: 75
End: 2025-06-30

## 2025-06-30 RX ORDER — TIRZEPATIDE 2.5 MG/.5ML
INJECTION, SOLUTION SUBCUTANEOUS
Qty: 2 ML | Refills: 3 | Status: SHIPPED | OUTPATIENT
Start: 2025-06-30

## 2025-06-30 NOTE — TELEPHONE ENCOUNTER
Caller: Deisy Lama    Relationship: Self    Best call back number: [phone]    What is the best time to reach you: ANYTIME     Who are you requesting to speak with (clinical staff, provider,  specific staff member): NURSE    What was the call regarding: PT IS ASKING IF SHE CAN DO THE RED LIGHT THERAPY WHILE HAVING A PACEMAKER.     Is it okay if the provider responds through MyChart: CALL BACK, IF NO ANSWER LEAVE A VM

## 2025-07-14 DIAGNOSIS — E03.9 HYPOTHYROIDISM, UNSPECIFIED TYPE: ICD-10-CM

## 2025-07-15 RX ORDER — LEVOTHYROXINE SODIUM 125 UG/1
125 TABLET ORAL DAILY
Qty: 90 TABLET | Refills: 3 | Status: SHIPPED | OUTPATIENT
Start: 2025-07-15

## 2025-07-17 LAB
MC_CV_MDC_IDC_RATE_1: 171
MC_CV_MDC_IDC_RATE_1: 171
MC_CV_MDC_IDC_RATE_1: 200
MC_CV_MDC_IDC_SVC_MEASURED_IMPEDANCE: 32
MC_CV_MDC_IDC_THERAPIES: NORMAL
MC_CV_MDC_IDC_ZONE_ID: 2
MC_CV_MDC_IDC_ZONE_ID: 3
MC_CV_MDC_IDC_ZONE_ID: 4
MC_CV_MDC_IDC_ZONE_ID: 5
MC_CV_MDC_IDC_ZONE_ID: 6
MDC_IDC_MSMT_BATTERY_REMAINING_LONGEVITY: 46 MO
MDC_IDC_MSMT_BATTERY_RRT_TRIGGER: 2.8
MDC_IDC_MSMT_BATTERY_VOLTAGE: 2.95
MDC_IDC_MSMT_CAP_CHARGE_TIME: 4
MDC_IDC_MSMT_LEADCHNL_LV_DTM: NORMAL
MDC_IDC_MSMT_LEADCHNL_LV_IMPEDANCE_VALUE: 380
MDC_IDC_MSMT_LEADCHNL_LV_PACING_THRESHOLD_AMPLITUDE: 4.25
MDC_IDC_MSMT_LEADCHNL_LV_PACING_THRESHOLD_POLARITY: NORMAL
MDC_IDC_MSMT_LEADCHNL_LV_PACING_THRESHOLD_PULSEWIDTH: 0.8
MDC_IDC_MSMT_LEADCHNL_RA_IMPEDANCE_VALUE: 494
MDC_IDC_MSMT_LEADCHNL_RA_SENSING_INTR_AMPL: 0.3
MDC_IDC_MSMT_LEADCHNL_RV_DTM: NORMAL
MDC_IDC_MSMT_LEADCHNL_RV_IMPEDANCE_VALUE: 456
MDC_IDC_MSMT_LEADCHNL_RV_PACING_THRESHOLD_AMPLITUDE: 0.5
MDC_IDC_MSMT_LEADCHNL_RV_PACING_THRESHOLD_POLARITY: NORMAL
MDC_IDC_MSMT_LEADCHNL_RV_PACING_THRESHOLD_PULSEWIDTH: 0.4
MDC_IDC_MSMT_LEADCHNL_RV_SENSING_INTR_AMPL: 14.6
MDC_IDC_PG_IMPLANT_DTM: NORMAL
MDC_IDC_PG_MFG: NORMAL
MDC_IDC_PG_MODEL: NORMAL
MDC_IDC_PG_SERIAL: NORMAL
MDC_IDC_PG_TYPE: NORMAL
MDC_IDC_SESS_DTM: NORMAL
MDC_IDC_SESS_TYPE: NORMAL
MDC_IDC_SET_BRADY_AT_MODE_SWITCH_RATE: 171
MDC_IDC_SET_BRADY_LOWRATE: 50
MDC_IDC_SET_BRADY_MAX_SENSOR_RATE: 120
MDC_IDC_SET_BRADY_MAX_TRACKING_RATE: 130
MDC_IDC_SET_BRADY_MODE: NORMAL
MDC_IDC_SET_BRADY_PAV_DELAY: 170
MDC_IDC_SET_BRADY_SAV_DELAY: 140
MDC_IDC_SET_CRT_LVRV_DELAY: 0
MDC_IDC_SET_CRT_PACED_CHAMBERS: NORMAL
MDC_IDC_SET_LEADCHNL_LV_PACING_AMPLITUDE: 3
MDC_IDC_SET_LEADCHNL_LV_PACING_POLARITY: NORMAL
MDC_IDC_SET_LEADCHNL_LV_PACING_PULSEWIDTH: 0.8
MDC_IDC_SET_LEADCHNL_RA_PACING_AMPLITUDE: 3
MDC_IDC_SET_LEADCHNL_RA_PACING_POLARITY: NORMAL
MDC_IDC_SET_LEADCHNL_RA_PACING_PULSEWIDTH: 1.5
MDC_IDC_SET_LEADCHNL_RA_SENSING_POLARITY: NORMAL
MDC_IDC_SET_LEADCHNL_RA_SENSING_SENSITIVITY: 0.3
MDC_IDC_SET_LEADCHNL_RV_PACING_AMPLITUDE: 2
MDC_IDC_SET_LEADCHNL_RV_PACING_POLARITY: NORMAL
MDC_IDC_SET_LEADCHNL_RV_PACING_PULSEWIDTH: 0.4
MDC_IDC_SET_LEADCHNL_RV_SENSING_POLARITY: NORMAL
MDC_IDC_SET_LEADCHNL_RV_SENSING_SENSITIVITY: 0.3
MDC_IDC_SET_ZONE_STATUS: NORMAL
MDC_IDC_SET_ZONE_TYPE: NORMAL
MDC_IDC_STAT_AT_BURDEN_PERCENT: 0
MDC_IDC_STAT_TACHYTHERAPY_ATP_DELIVERED_RECENT: 0
MDC_IDC_STAT_TACHYTHERAPY_SHOCKS_ABORTED_RECENT: 0
MDC_IDC_STAT_TACHYTHERAPY_SHOCKS_DELIVERED_RECENT: 0

## 2025-08-20 ENCOUNTER — OFFICE VISIT (OUTPATIENT)
Dept: FAMILY MEDICINE CLINIC | Facility: CLINIC | Age: 75
End: 2025-08-20
Payer: MEDICARE

## 2025-08-20 ENCOUNTER — LAB (OUTPATIENT)
Dept: LAB | Facility: HOSPITAL | Age: 75
End: 2025-08-20
Payer: MEDICARE

## 2025-08-20 VITALS
SYSTOLIC BLOOD PRESSURE: 129 MMHG | HEART RATE: 53 BPM | BODY MASS INDEX: 29.19 KG/M2 | HEIGHT: 64 IN | DIASTOLIC BLOOD PRESSURE: 82 MMHG | OXYGEN SATURATION: 99 % | TEMPERATURE: 97.7 F | RESPIRATION RATE: 16 BRPM | WEIGHT: 171 LBS

## 2025-08-20 DIAGNOSIS — R73.01 IFG (IMPAIRED FASTING GLUCOSE): ICD-10-CM

## 2025-08-20 DIAGNOSIS — R60.0 BILATERAL LEG EDEMA: ICD-10-CM

## 2025-08-20 DIAGNOSIS — Z11.59 NEED FOR HEPATITIS C SCREENING TEST: ICD-10-CM

## 2025-08-20 DIAGNOSIS — I10 ESSENTIAL HYPERTENSION: ICD-10-CM

## 2025-08-20 DIAGNOSIS — I50.22 CHRONIC HFREF (HEART FAILURE WITH REDUCED EJECTION FRACTION): ICD-10-CM

## 2025-08-20 DIAGNOSIS — R60.0 BILATERAL LEG EDEMA: Primary | ICD-10-CM

## 2025-08-20 LAB
ALBUMIN SERPL-MCNC: 4 G/DL (ref 3.5–5.2)
ALBUMIN/GLOB SERPL: 1.3 G/DL
ALP SERPL-CCNC: 121 U/L (ref 39–117)
ALT SERPL W P-5'-P-CCNC: 11 U/L (ref 1–33)
ANION GAP SERPL CALCULATED.3IONS-SCNC: 13.8 MMOL/L (ref 5–15)
AST SERPL-CCNC: 20 U/L (ref 1–32)
BILIRUB SERPL-MCNC: 0.4 MG/DL (ref 0–1.2)
BUN SERPL-MCNC: 65 MG/DL (ref 8–23)
BUN/CREAT SERPL: 25.8 (ref 7–25)
CALCIUM SPEC-SCNC: 9.8 MG/DL (ref 8.6–10.5)
CHLORIDE SERPL-SCNC: 105 MMOL/L (ref 98–107)
CHOLEST SERPL-MCNC: 107 MG/DL (ref 0–200)
CO2 SERPL-SCNC: 26.2 MMOL/L (ref 22–29)
CREAT SERPL-MCNC: 2.52 MG/DL (ref 0.57–1)
DEPRECATED RDW RBC AUTO: 43.1 FL (ref 37–54)
EGFRCR SERPLBLD CKD-EPI 2021: 19.2 ML/MIN/1.73
ERYTHROCYTE [DISTWIDTH] IN BLOOD BY AUTOMATED COUNT: 12.9 % (ref 12.3–15.4)
GLOBULIN UR ELPH-MCNC: 3.1 GM/DL
GLUCOSE SERPL-MCNC: 100 MG/DL (ref 65–99)
HBA1C MFR BLD: 5.9 % (ref 4.8–5.6)
HCT VFR BLD AUTO: 39.6 % (ref 34–46.6)
HCV AB SER QL: NORMAL
HDLC SERPL-MCNC: 40 MG/DL (ref 40–60)
HGB BLD-MCNC: 12.6 G/DL (ref 12–15.9)
LDLC SERPL CALC-MCNC: 54 MG/DL (ref 0–100)
LDLC/HDLC SERPL: 1.4 {RATIO}
MCH RBC QN AUTO: 29.3 PG (ref 26.6–33)
MCHC RBC AUTO-ENTMCNC: 31.8 G/DL (ref 31.5–35.7)
MCV RBC AUTO: 92.1 FL (ref 79–97)
PLATELET # BLD AUTO: 423 10*3/MM3 (ref 140–450)
PMV BLD AUTO: 10.9 FL (ref 6–12)
POTASSIUM SERPL-SCNC: 4.6 MMOL/L (ref 3.5–5.2)
PROT SERPL-MCNC: 7.1 G/DL (ref 6–8.5)
RBC # BLD AUTO: 4.3 10*6/MM3 (ref 3.77–5.28)
SODIUM SERPL-SCNC: 145 MMOL/L (ref 136–145)
T4 FREE SERPL-MCNC: 1.54 NG/DL (ref 0.92–1.68)
TRIGL SERPL-MCNC: 55 MG/DL (ref 0–150)
TSH SERPL DL<=0.05 MIU/L-ACNC: 0.44 UIU/ML (ref 0.27–4.2)
VLDLC SERPL-MCNC: 13 MG/DL (ref 5–40)
WBC NRBC COR # BLD AUTO: 10.2 10*3/MM3 (ref 3.4–10.8)

## 2025-08-20 PROCEDURE — 3074F SYST BP LT 130 MM HG: CPT | Performed by: FAMILY MEDICINE

## 2025-08-20 PROCEDURE — 86803 HEPATITIS C AB TEST: CPT

## 2025-08-20 PROCEDURE — 80061 LIPID PANEL: CPT

## 2025-08-20 PROCEDURE — 99214 OFFICE O/P EST MOD 30 MIN: CPT | Performed by: FAMILY MEDICINE

## 2025-08-20 PROCEDURE — 36415 COLL VENOUS BLD VENIPUNCTURE: CPT

## 2025-08-20 PROCEDURE — 80053 COMPREHEN METABOLIC PANEL: CPT

## 2025-08-20 PROCEDURE — 85027 COMPLETE CBC AUTOMATED: CPT

## 2025-08-20 PROCEDURE — 84443 ASSAY THYROID STIM HORMONE: CPT

## 2025-08-20 PROCEDURE — 83036 HEMOGLOBIN GLYCOSYLATED A1C: CPT

## 2025-08-20 PROCEDURE — 1126F AMNT PAIN NOTED NONE PRSNT: CPT | Performed by: FAMILY MEDICINE

## 2025-08-20 PROCEDURE — 84439 ASSAY OF FREE THYROXINE: CPT

## 2025-08-20 PROCEDURE — 3079F DIAST BP 80-89 MM HG: CPT | Performed by: FAMILY MEDICINE

## 2025-08-20 RX ORDER — KETOROLAC TROMETHAMINE 5 MG/ML
SOLUTION OPHTHALMIC
COMMUNITY
Start: 2025-08-14

## 2025-08-20 RX ORDER — GLIMEPIRIDE 1 MG/1
1 TABLET ORAL DAILY
Qty: 30 TABLET | Refills: 5 | Status: SHIPPED | OUTPATIENT
Start: 2025-08-20

## 2025-08-20 RX ORDER — MOXIFLOXACIN 5 MG/ML
SOLUTION/ DROPS OPHTHALMIC
COMMUNITY
Start: 2025-08-14

## 2025-08-20 RX ORDER — DOXYCYCLINE 100 MG/1
1 CAPSULE ORAL EVERY 12 HOURS SCHEDULED
COMMUNITY
Start: 2025-08-08 | End: 2025-08-20

## 2025-08-20 RX ORDER — FUROSEMIDE 80 MG/1
TABLET ORAL
COMMUNITY
End: 2025-08-20

## 2025-08-20 RX ORDER — METOLAZONE 5 MG/1
TABLET ORAL
COMMUNITY
Start: 2025-06-17

## 2025-08-20 RX ORDER — MUPIROCIN 2 %
OINTMENT (GRAM) TOPICAL
COMMUNITY
Start: 2025-08-08

## (undated) DEVICE — APPL CHLORAPREP HI/LITE 26ML ORNG

## (undated) DEVICE — PENCL E/S SMOKEEVAC W/TELESCP CANN

## (undated) DEVICE — GLV SURG SENSICARE PI ORTHO SZ8 LF STRL

## (undated) DEVICE — SUT MERSILENE POLYSTR CT1 BR 0 75CM GRN

## (undated) DEVICE — SOL IRR NACL 0.9PCT BT 1000ML

## (undated) DEVICE — ANTIBACTERIAL UNDYED BRAIDED (POLYGLACTIN 910), SYNTHETIC ABSORBABLE SUTURE: Brand: COATED VICRYL

## (undated) DEVICE — BLUNT TIP LAPAROSCOPIC SEALER/DIVIDER NANO-COATED: Brand: LIGASURE

## (undated) DEVICE — TOTAL TRAY, 16FR 10ML SIL FOLEY, URN: Brand: MEDLINE

## (undated) DEVICE — 30977 SEE SHARP - ENHANCED INTRAOPERATIVE LAPAROSCOPE CLEANING & DEFOGGING: Brand: 30977 SEE SHARP - ENHANCED INTRAOPERATIVE LAPAROSCOPE CLEANING & DEFOGGING

## (undated) DEVICE — SLV SCD KN/LEN ADJ EXPRSS BLENDED MD 1P/U

## (undated) DEVICE — TIP COVER ACCESSORY

## (undated) DEVICE — INTENDED FOR TISSUE SEPARATION, AND OTHER PROCEDURES THAT REQUIRE A SHARP SURGICAL BLADE TO PUNCTURE OR CUT.: Brand: BARD-PARKER ® CARBON RIB-BACK BLADES

## (undated) DEVICE — ARM DRAPE

## (undated) DEVICE — SYS CLOSE PORTII CARTR/THOMASN XL

## (undated) DEVICE — STERILE POLYISOPRENE POWDER-FREE SURGICAL GLOVES WITH EMOLLIENT COATING: Brand: PROTEXIS

## (undated) DEVICE — LAMINECTOMY CERVICAL DISC-LF: Brand: MEDLINE INDUSTRIES, INC.

## (undated) DEVICE — PENCL E/S HNDSWCH ROCKR CB

## (undated) DEVICE — STERILE POLYISOPRENE POWDER-FREE SURGICAL GLOVES: Brand: PROTEXIS

## (undated) DEVICE — DRP MICROSCP LECIA W/CLEARLENS 137X381CM

## (undated) DEVICE — TISSUE RETRIEVAL SYSTEM: Brand: INZII RETRIEVAL SYSTEM

## (undated) DEVICE — ENDOPATH XCEL WITH OPTIVIEW TECHNOLOGY BLADELESS TROCARS WITH STABILITY SLEEVES: Brand: ENDOPATH XCEL OPTIVIEW

## (undated) DEVICE — UNDYED BRAIDED (POLYGLACTIN 910), SYNTHETIC ABSORBABLE SUTURE: Brand: COATED VICRYL

## (undated) DEVICE — SUT VIC 0/0 UR6 27IN DYED J603H

## (undated) DEVICE — DRSNG SURG AQUACEL AG/ADVNTGE 9X15CM 3.5X6IN

## (undated) DEVICE — GLV SURG BIOGEL LTX PF 7 1/2

## (undated) DEVICE — 3M™ STERI-STRIP™ REINFORCED ADHESIVE SKIN CLOSURES, R1546, 1/4 IN X 4 IN (6 MM X 100 MM), 10 STRIPS/ENVELOPE: Brand: 3M™ STERI-STRIP™

## (undated) DEVICE — DAVINCI-LF: Brand: MEDLINE INDUSTRIES, INC.

## (undated) DEVICE — 3M™ STERI-STRIP™ REINFORCED ADHESIVE SKIN CLOSURES, R1547, 1/2 IN X 4 IN (12 MM X 100 MM), 6 STRIPS/ENVELOPE: Brand: 3M™ STERI-STRIP™

## (undated) DEVICE — CANNULA SEAL

## (undated) DEVICE — LAPAROSCOPIC SCISSORS: Brand: EPIX LAPAROSCOPIC SCISSORS

## (undated) DEVICE — LIGHT SLEEVE: Brand: DEVON

## (undated) DEVICE — DRSNG WND GZ CURAD OIL EMULSION 3X3IN STRL

## (undated) DEVICE — NON-WOVEN ADHESIVE WOUND DRESSING: Brand: PRIMAPORE ADHESIVE WOUND DRSG 7.2*5CM

## (undated) DEVICE — GAMMEX® NON-LATEX SIZE 7.5, STERILE NEOPRENE POWDER-FREE SURGICAL GLOVE: Brand: GAMMEX